# Patient Record
Sex: MALE | Race: WHITE | Employment: FULL TIME | URBAN - NONMETROPOLITAN AREA
[De-identification: names, ages, dates, MRNs, and addresses within clinical notes are randomized per-mention and may not be internally consistent; named-entity substitution may affect disease eponyms.]

---

## 2017-07-19 RX ORDER — ROSUVASTATIN CALCIUM 10 MG/1
TABLET, COATED ORAL
Qty: 90 TABLET | Refills: 3 | Status: SHIPPED | OUTPATIENT
Start: 2017-07-19 | End: 2018-02-01 | Stop reason: SDUPTHER

## 2017-07-31 RX ORDER — DIPHENOXYLATE HYDROCHLORIDE AND ATROPINE SULFATE 2.5; .025 MG/1; MG/1
1 TABLET ORAL 4 TIMES DAILY PRN
Qty: 180 TABLET | Refills: 1 | Status: SHIPPED | OUTPATIENT
Start: 2017-07-31 | End: 2017-08-28 | Stop reason: SDUPTHER

## 2017-08-28 RX ORDER — DIPHENOXYLATE HYDROCHLORIDE AND ATROPINE SULFATE 2.5; .025 MG/1; MG/1
1 TABLET ORAL 4 TIMES DAILY PRN
Qty: 56 TABLET | Refills: 0 | Status: SHIPPED | OUTPATIENT
Start: 2017-08-28 | End: 2017-10-03 | Stop reason: SDUPTHER

## 2017-10-03 ENCOUNTER — OFFICE VISIT (OUTPATIENT)
Dept: FAMILY MEDICINE CLINIC | Age: 62
End: 2017-10-03
Payer: COMMERCIAL

## 2017-10-03 VITALS
BODY MASS INDEX: 25.77 KG/M2 | SYSTOLIC BLOOD PRESSURE: 128 MMHG | HEART RATE: 55 BPM | WEIGHT: 180 LBS | OXYGEN SATURATION: 98 % | HEIGHT: 70 IN | RESPIRATION RATE: 18 BRPM | DIASTOLIC BLOOD PRESSURE: 80 MMHG

## 2017-10-03 DIAGNOSIS — K58.9 IRRITABLE BOWEL SYNDROME, UNSPECIFIED TYPE: ICD-10-CM

## 2017-10-03 DIAGNOSIS — K21.9 GERD WITHOUT ESOPHAGITIS: ICD-10-CM

## 2017-10-03 DIAGNOSIS — I10 ESSENTIAL (PRIMARY) HYPERTENSION: ICD-10-CM

## 2017-10-03 DIAGNOSIS — E78.01 FAMILIAL HYPERCHOLESTEROLEMIA: Primary | ICD-10-CM

## 2017-10-03 PROCEDURE — 99214 OFFICE O/P EST MOD 30 MIN: CPT | Performed by: FAMILY MEDICINE

## 2017-10-03 RX ORDER — DIPHENOXYLATE HYDROCHLORIDE AND ATROPINE SULFATE 2.5; .025 MG/1; MG/1
1 TABLET ORAL 2 TIMES DAILY
Qty: 180 TABLET | Refills: 0 | Status: SHIPPED | OUTPATIENT
Start: 2017-10-03 | End: 2017-12-05 | Stop reason: SDUPTHER

## 2017-10-03 RX ORDER — PHENOBARBITAL, HYOSCYAMINE SULFATE, ATROPINE SULFATE AND SCOPOLAMINE HYDROBROMIDE .0194; .1037; 16.2; .0065 MG/1; MG/1; MG/1; MG/1
1 TABLET ORAL DAILY
Qty: 90 TABLET | Refills: 1 | Status: SHIPPED | OUTPATIENT
Start: 2017-10-03 | End: 2018-02-01 | Stop reason: SDUPTHER

## 2017-10-03 RX ORDER — METOPROLOL SUCCINATE 100 MG/1
TABLET, EXTENDED RELEASE ORAL
COMMUNITY
Start: 2017-08-08 | End: 2018-02-01 | Stop reason: SDUPTHER

## 2017-10-03 RX ORDER — PHENOBARBITAL, HYOSCYAMINE SULFATE, ATROPINE SULFATE AND SCOPOLAMINE HYDROBROMIDE .0194; .1037; 16.2; .0065 MG/1; MG/1; MG/1; MG/1
1 TABLET ORAL DAILY
COMMUNITY
End: 2017-10-03 | Stop reason: SDUPTHER

## 2017-10-03 NOTE — MR AVS SNAPSHOT
After Visit Summary             Hyacinth Cheema   10/3/2017 3:30 PM   Office Visit    Description:  Male : 1955   Provider:  Charli Ellis MD   Department:  [address]              Your Follow-Up and Future Appointments         Below is a list of your follow-up and future appointments. This may not be a complete list as you may have made appointments directly with providers that we are not aware of or your providers may have made some for you. Please call your providers to confirm appointments. It is important to keep your appointments. Please bring your current insurance card, photo ID, co-pay, and all medication bottles to your appointment. If self-pay, payment is expected at the time of service. Your To-Do List     Future Appointments Provider Department Dept Phone    2018 2:45 PM MD [address] [phone]    If this is a sports or school physical please bring the physical form with you. Future Orders Complete By Expires    Comprehensive Metabolic Panel [QKY67 Custom]  2017    Lipid Panel [LAB18 Custom]  2017    Follow-Up    Return in about 3 months (around 1/3/2018) for Physical.         Information from Your Visit        Department     Name Address Phone Fax    [address] [address] [address]  [phone]      You Were Seen for:         Comments    Familial hypercholesterolemia   [577366]         Vital Signs     Blood Pressure Pulse Respirations Height Weight Oxygen Saturation    128/80 55 18 5' 10\" (1.778 m) 180 lb (81.6 kg) 98%    Body Mass Index Smoking Status                25.83 kg/m2 Light Tobacco Smoker          Additional Information about your Body Mass Index (BMI)           Your BMI as listed above is considered overweight (25.0-29.9). BMI is an estimate of body fat, calculated from your height and weight.   The higher aspirin 81 MG EC tablet Take 81 mg by mouth daily. propranolol (INDERAL LA) 60 MG CR capsule Take 60 mg by mouth daily. phenobarbital-belladonna alkaloids 16 MG/5ML elixir Take 2 mLs by mouth every 6 hours. Allergies              Dexamethasone     Severe hiccups placed in hospital for 3 days    Celebrex [Celecoxib] Rash         Additional Information        Basic Information     Date Of Birth Sex Race Ethnicity Preferred Language Preferred Written Language    1955 Male White Non-/Non  English English      Problem List as of 10/3/2017  Date Reviewed: 11/3/2011                Familial hypercholesterolemia    S/P Exc Inclusion Cyst Rt Buttock, Perianal Fistulotomy, Ext Hemorrhoids      Preventive Care        Date Due    Hepatitis C screening is recommended for all adults regardless of risk factors born between [birth year] and 1965 at least once (lifetime) who have never been tested. 1955    HIV screening is recommended for all people regardless of risk factors  aged 15-65 years at least once (lifetime) who have never been HIV tested. 1/31/1971    Tetanus Combination Vaccine (1 - Tdap) 1/31/1975    Pneumococcal Vaccine - Pneumovax for adults aged 19-64 years with: chronic heart disease, chronic lung disease, diabetes mellitus, alcoholism, chronic liver disease, or cigarette smoking. (1 of 1 - PPSV23) 1/31/1975    Cholesterol Screening 1/31/1996    Diabetes Screening 1/31/1996    Colonoscopy 1/31/2006    Zoster Vaccine 1/31/2016    Yearly Flu Vaccine (1) 9/1/2017            MyChart Signup           MyChart allows you to send messages to your doctor, view your test results, renew your prescriptions, schedule appointments, view visit notes, and more. How Do I Sign Up? 1. In your Internet browser, go to [email or web address]  2. Click on the Sign Up Now link in the Sign In box. You will see the New Member Sign Up page. 3. Enter your Taggled Access Code exactly as it appears below. You will not need to use this code after youve completed the sign-up process. If you do not sign up before the expiration date, you must request a new code. Taggled Access Code: [email or web address]  Expires: 12/2/2017  4:40 PM    4. Enter your Social Security Number ([id number]) and Date of Birth (mm/dd/yyyy) as indicated and click Submit. You will be taken to the next sign-up page. 5. Create a Taggled ID. This will be your Taggled login ID and cannot be changed, so think of one that is secure and easy to remember. 6. Create a Taggled password. You can change your password at any time. 7. Enter your Password Reset Question and Answer. This can be used at a later time if you forget your password. 8. Enter your e-mail address. You will receive e-mail notification when new information is available in [address]. 9. Click Sign Up. You can now view your medical record. Additional Information  If you have questions, please contact the physician practice where you receive care. Remember, Taggled is NOT to be used for urgent needs. For medical emergencies, dial 911. For questions regarding your Taggled account call [phone]. If you have a clinical question, please call your doctor's office.

## 2017-10-22 PROBLEM — M51.36 DISC DEGENERATION, LUMBAR: Status: ACTIVE | Noted: 2017-10-22

## 2017-10-22 PROBLEM — M51.369 DISC DEGENERATION, LUMBAR: Status: ACTIVE | Noted: 2017-10-22

## 2017-10-22 PROBLEM — I10 ESSENTIAL (PRIMARY) HYPERTENSION: Status: ACTIVE | Noted: 2017-10-22

## 2017-10-22 PROBLEM — K58.9 IRRITABLE BOWEL SYNDROME: Status: ACTIVE | Noted: 2017-10-22

## 2017-10-22 PROBLEM — K21.9 GERD WITHOUT ESOPHAGITIS: Status: ACTIVE | Noted: 2017-10-22

## 2017-10-22 PROBLEM — J84.10 LUNG GRANULOMA (HCC): Status: ACTIVE | Noted: 2017-10-22

## 2017-10-22 PROBLEM — K22.2 ESOPHAGEAL STRICTURE: Status: ACTIVE | Noted: 2017-10-22

## 2017-10-22 PROBLEM — K76.89 HEPATIC CYST: Status: ACTIVE | Noted: 2017-10-22

## 2017-10-22 PROBLEM — J44.9 CHRONIC OBSTRUCTIVE PULMONARY DISEASE (HCC): Status: ACTIVE | Noted: 2017-10-22

## 2017-10-22 PROBLEM — K27.9 PEPTIC ULCER: Status: ACTIVE | Noted: 2017-10-22

## 2017-11-02 DIAGNOSIS — E78.01 FAMILIAL HYPERCHOLESTEROLEMIA: ICD-10-CM

## 2017-11-02 LAB
ALBUMIN SERPL-MCNC: 3.8 G/DL (ref 3.5–5.2)
ALP BLD-CCNC: 70 U/L (ref 40–130)
ALT SERPL-CCNC: 18 U/L (ref 5–41)
ANION GAP SERPL CALCULATED.3IONS-SCNC: 12 MMOL/L (ref 7–19)
AST SERPL-CCNC: 18 U/L (ref 5–40)
BILIRUB SERPL-MCNC: 0.3 MG/DL (ref 0.2–1.2)
BUN BLDV-MCNC: 10 MG/DL (ref 8–23)
CALCIUM SERPL-MCNC: 9 MG/DL (ref 8.8–10.2)
CHLORIDE BLD-SCNC: 101 MMOL/L (ref 98–111)
CHOLESTEROL, TOTAL: 133 MG/DL (ref 160–199)
CO2: 30 MMOL/L (ref 22–29)
CREAT SERPL-MCNC: 0.9 MG/DL (ref 0.5–1.2)
GFR NON-AFRICAN AMERICAN: >60
GLUCOSE BLD-MCNC: 92 MG/DL (ref 74–109)
HDLC SERPL-MCNC: 42 MG/DL (ref 55–121)
LDL CHOLESTEROL CALCULATED: 62 MG/DL
POTASSIUM SERPL-SCNC: 3.4 MMOL/L (ref 3.5–5)
SODIUM BLD-SCNC: 143 MMOL/L (ref 136–145)
TOTAL PROTEIN: 6.5 G/DL (ref 6.6–8.7)
TRIGL SERPL-MCNC: 147 MG/DL (ref 0–149)

## 2017-12-05 RX ORDER — DIPHENOXYLATE HYDROCHLORIDE AND ATROPINE SULFATE 2.5; .025 MG/1; MG/1
TABLET ORAL
Qty: 180 TABLET | Refills: 0 | Status: SHIPPED | OUTPATIENT
Start: 2017-12-05 | End: 2018-02-21 | Stop reason: SDUPTHER

## 2017-12-05 RX ORDER — DIPHENOXYLATE HYDROCHLORIDE AND ATROPINE SULFATE 2.5; .025 MG/1; MG/1
TABLET ORAL
Qty: 180 TABLET | Refills: 0 | Status: SHIPPED | OUTPATIENT
Start: 2017-12-05 | End: 2018-03-05 | Stop reason: SDUPTHER

## 2018-02-01 RX ORDER — PHENOBARBITAL, HYOSCYAMINE SULFATE, ATROPINE SULFATE AND SCOPOLAMINE HYDROBROMIDE .0194; .1037; 16.2; .0065 MG/1; MG/1; MG/1; MG/1
1 TABLET ORAL DAILY
Qty: 90 TABLET | Refills: 1 | Status: SHIPPED | OUTPATIENT
Start: 2018-02-01 | End: 2018-02-21 | Stop reason: SDUPTHER

## 2018-02-01 RX ORDER — PROPRANOLOL HCL 60 MG
60 CAPSULE, EXTENDED RELEASE 24HR ORAL DAILY
Qty: 90 CAPSULE | Refills: 2 | Status: SHIPPED | OUTPATIENT
Start: 2018-02-01 | End: 2018-03-12 | Stop reason: CLARIF

## 2018-02-01 RX ORDER — LOSARTAN POTASSIUM AND HYDROCHLOROTHIAZIDE 12.5; 1 MG/1; MG/1
TABLET ORAL
Qty: 90 TABLET | Refills: 3 | Status: SHIPPED | OUTPATIENT
Start: 2018-02-01 | End: 2019-01-16 | Stop reason: SDUPTHER

## 2018-02-01 RX ORDER — IRBESARTAN AND HYDROCHLOROTHIAZIDE 150; 12.5 MG/1; MG/1
1 TABLET, FILM COATED ORAL DAILY
Qty: 90 TABLET | Refills: 2 | Status: SHIPPED | OUTPATIENT
Start: 2018-02-01 | End: 2018-03-12 | Stop reason: CLARIF

## 2018-02-01 RX ORDER — METOPROLOL SUCCINATE 100 MG/1
100 TABLET, EXTENDED RELEASE ORAL DAILY
Qty: 90 TABLET | Refills: 2 | Status: SHIPPED | OUTPATIENT
Start: 2018-02-01 | End: 2018-10-16 | Stop reason: SDUPTHER

## 2018-02-01 RX ORDER — ROSUVASTATIN CALCIUM 10 MG/1
TABLET, COATED ORAL
Qty: 90 TABLET | Refills: 3 | Status: SHIPPED | OUTPATIENT
Start: 2018-02-01 | End: 2018-12-31 | Stop reason: SDUPTHER

## 2018-02-21 RX ORDER — BUPROPION HYDROCHLORIDE 150 MG/1
150 TABLET, EXTENDED RELEASE ORAL DAILY
COMMUNITY
End: 2018-02-21 | Stop reason: SDUPTHER

## 2018-02-22 RX ORDER — BUPROPION HYDROCHLORIDE 150 MG/1
150 TABLET, EXTENDED RELEASE ORAL DAILY
Qty: 180 TABLET | Refills: 2 | Status: SHIPPED | OUTPATIENT
Start: 2018-02-22 | End: 2018-03-05 | Stop reason: SDUPTHER

## 2018-02-22 RX ORDER — DIPHENOXYLATE HYDROCHLORIDE AND ATROPINE SULFATE 2.5; .025 MG/1; MG/1
TABLET ORAL
Qty: 180 TABLET | Refills: 0 | Status: SHIPPED | OUTPATIENT
Start: 2018-02-22 | End: 2018-06-04 | Stop reason: SDUPTHER

## 2018-02-22 RX ORDER — PHENOBARBITAL, HYOSCYAMINE SULFATE, ATROPINE SULFATE AND SCOPOLAMINE HYDROBROMIDE .0194; .1037; 16.2; .0065 MG/1; MG/1; MG/1; MG/1
1 TABLET ORAL DAILY
Qty: 90 TABLET | Refills: 1 | Status: SHIPPED | OUTPATIENT
Start: 2018-02-22 | End: 2018-03-05 | Stop reason: SDUPTHER

## 2018-03-05 RX ORDER — BUPROPION HYDROCHLORIDE 150 MG/1
150 TABLET, EXTENDED RELEASE ORAL DAILY
Qty: 180 TABLET | Refills: 2 | Status: SHIPPED | OUTPATIENT
Start: 2018-03-05 | End: 2019-02-11 | Stop reason: SDUPTHER

## 2018-03-05 RX ORDER — PHENOBARBITAL, HYOSCYAMINE SULFATE, ATROPINE SULFATE AND SCOPOLAMINE HYDROBROMIDE .0194; .1037; 16.2; .0065 MG/1; MG/1; MG/1; MG/1
1 TABLET ORAL DAILY
Qty: 90 TABLET | Refills: 1 | Status: SHIPPED | OUTPATIENT
Start: 2018-03-05 | End: 2018-03-06 | Stop reason: SDUPTHER

## 2018-03-05 RX ORDER — DIPHENOXYLATE HYDROCHLORIDE AND ATROPINE SULFATE 2.5; .025 MG/1; MG/1
TABLET ORAL
Qty: 180 TABLET | Refills: 0 | Status: SHIPPED | OUTPATIENT
Start: 2018-03-05 | End: 2018-03-12 | Stop reason: CLARIF

## 2018-03-06 RX ORDER — PHENOBARBITAL, HYOSCYAMINE SULFATE, ATROPINE SULFATE AND SCOPOLAMINE HYDROBROMIDE .0194; .1037; 16.2; .0065 MG/1; MG/1; MG/1; MG/1
1 TABLET ORAL DAILY
Qty: 90 TABLET | Refills: 1 | Status: SHIPPED | OUTPATIENT
Start: 2018-03-06 | End: 2018-03-12 | Stop reason: CLARIF

## 2018-03-07 ENCOUNTER — TELEPHONE (OUTPATIENT)
Dept: FAMILY MEDICINE CLINIC | Age: 63
End: 2018-03-07

## 2018-03-08 RX ORDER — CHLORDIAZEPOXIDE HYDROCHLORIDE AND CLIDINIUM BROMIDE 5; 2.5 MG/1; MG/1
1 CAPSULE ORAL
Qty: 90 CAPSULE | Refills: 3 | Status: SHIPPED | OUTPATIENT
Start: 2018-03-08 | End: 2018-03-08 | Stop reason: CLARIF

## 2018-03-08 RX ORDER — CHLORDIAZEPOXIDE HYDROCHLORIDE AND CLIDINIUM BROMIDE 5; 2.5 MG/1; MG/1
1 CAPSULE ORAL DAILY
Qty: 90 CAPSULE | Refills: 0 | Status: SHIPPED | OUTPATIENT
Start: 2018-03-08 | End: 2018-10-16 | Stop reason: CLARIF

## 2018-03-08 RX ORDER — CHLORDIAZEPOXIDE HYDROCHLORIDE AND CLIDINIUM BROMIDE 5; 2.5 MG/1; MG/1
1 CAPSULE ORAL
Qty: 270 CAPSULE | Refills: 0 | Status: SHIPPED | OUTPATIENT
Start: 2018-03-08 | End: 2018-03-08 | Stop reason: CLARIF

## 2018-03-08 NOTE — TELEPHONE ENCOUNTER
Per Patient librax will have to replace Daltonheavenal for  His insurance to pay. Is this ok to send in? He is requesting a 90 day supply. I did tell him that you may not be able to do that because it is controlled. I pended an order for  You to sign.  Not sure if it would be correct dose

## 2018-03-12 ENCOUNTER — OFFICE VISIT (OUTPATIENT)
Dept: FAMILY MEDICINE CLINIC | Age: 63
End: 2018-03-12
Payer: COMMERCIAL

## 2018-03-12 VITALS
HEART RATE: 61 BPM | TEMPERATURE: 98.3 F | HEIGHT: 70 IN | WEIGHT: 184 LBS | BODY MASS INDEX: 26.34 KG/M2 | SYSTOLIC BLOOD PRESSURE: 122 MMHG | OXYGEN SATURATION: 99 % | DIASTOLIC BLOOD PRESSURE: 70 MMHG

## 2018-03-12 DIAGNOSIS — Z00.00 ANNUAL PHYSICAL EXAM: ICD-10-CM

## 2018-03-12 DIAGNOSIS — K58.9 IRRITABLE BOWEL SYNDROME, UNSPECIFIED TYPE: ICD-10-CM

## 2018-03-12 DIAGNOSIS — I10 ESSENTIAL (PRIMARY) HYPERTENSION: ICD-10-CM

## 2018-03-12 DIAGNOSIS — E78.01 FAMILIAL HYPERCHOLESTEROLEMIA: Primary | ICD-10-CM

## 2018-03-12 DIAGNOSIS — Z12.5 SCREENING FOR PROSTATE CANCER: ICD-10-CM

## 2018-03-12 PROCEDURE — 99396 PREV VISIT EST AGE 40-64: CPT | Performed by: FAMILY MEDICINE

## 2018-03-12 RX ORDER — HYOSCYAMINE SULFATE 0.125 MG
125 TABLET ORAL DAILY
Qty: 90 TABLET | Refills: 3 | Status: SHIPPED | OUTPATIENT
Start: 2018-03-12 | End: 2018-10-16 | Stop reason: SDUPTHER

## 2018-03-12 RX ORDER — PHENOBARBITAL 16.2 MG/1
16.2 TABLET ORAL DAILY
Qty: 90 TABLET | Refills: 3 | Status: SHIPPED | OUTPATIENT
Start: 2018-03-12 | End: 2018-11-07 | Stop reason: SDUPTHER

## 2018-03-12 ASSESSMENT — PATIENT HEALTH QUESTIONNAIRE - PHQ9
SUM OF ALL RESPONSES TO PHQ QUESTIONS 1-9: 0
2. FEELING DOWN, DEPRESSED OR HOPELESS: 0
1. LITTLE INTEREST OR PLEASURE IN DOING THINGS: 0
SUM OF ALL RESPONSES TO PHQ9 QUESTIONS 1 & 2: 0

## 2018-03-16 ASSESSMENT — ENCOUNTER SYMPTOMS
ANAL BLEEDING: 0
SHORTNESS OF BREATH: 0
CHEST TIGHTNESS: 0
ABDOMINAL PAIN: 0
COUGH: 0
CONSTIPATION: 0
DIARRHEA: 0
NAUSEA: 0

## 2018-06-04 DIAGNOSIS — K58.0 IRRITABLE BOWEL SYNDROME WITH DIARRHEA: Primary | ICD-10-CM

## 2018-06-04 RX ORDER — DIPHENOXYLATE HYDROCHLORIDE AND ATROPINE SULFATE 2.5; .025 MG/1; MG/1
TABLET ORAL
Qty: 180 TABLET | Refills: 3 | Status: SHIPPED | OUTPATIENT
Start: 2018-06-04 | End: 2018-10-16 | Stop reason: SDUPTHER

## 2018-10-16 ENCOUNTER — OFFICE VISIT (OUTPATIENT)
Dept: FAMILY MEDICINE CLINIC | Age: 63
End: 2018-10-16
Payer: COMMERCIAL

## 2018-10-16 VITALS
HEART RATE: 47 BPM | WEIGHT: 180 LBS | DIASTOLIC BLOOD PRESSURE: 72 MMHG | OXYGEN SATURATION: 99 % | BODY MASS INDEX: 25.83 KG/M2 | SYSTOLIC BLOOD PRESSURE: 124 MMHG | TEMPERATURE: 97.5 F

## 2018-10-16 DIAGNOSIS — R07.89 CHEST TIGHTNESS OR PRESSURE: ICD-10-CM

## 2018-10-16 DIAGNOSIS — K58.0 IRRITABLE BOWEL SYNDROME WITH DIARRHEA: ICD-10-CM

## 2018-10-16 DIAGNOSIS — J44.9 CHRONIC OBSTRUCTIVE PULMONARY DISEASE, UNSPECIFIED COPD TYPE (HCC): ICD-10-CM

## 2018-10-16 DIAGNOSIS — R00.1 BRADYCARDIA: ICD-10-CM

## 2018-10-16 DIAGNOSIS — R53.83 FATIGUE, UNSPECIFIED TYPE: ICD-10-CM

## 2018-10-16 DIAGNOSIS — G56.03 BILATERAL CARPAL TUNNEL SYNDROME: ICD-10-CM

## 2018-10-16 DIAGNOSIS — I10 ESSENTIAL (PRIMARY) HYPERTENSION: Primary | ICD-10-CM

## 2018-10-16 LAB

## 2018-10-16 PROCEDURE — 93000 ELECTROCARDIOGRAM COMPLETE: CPT | Performed by: FAMILY MEDICINE

## 2018-10-16 PROCEDURE — 99214 OFFICE O/P EST MOD 30 MIN: CPT | Performed by: FAMILY MEDICINE

## 2018-10-16 PROCEDURE — 80305 DRUG TEST PRSMV DIR OPT OBS: CPT | Performed by: FAMILY MEDICINE

## 2018-10-16 RX ORDER — DIPHENOXYLATE HYDROCHLORIDE AND ATROPINE SULFATE 2.5; .025 MG/1; MG/1
TABLET ORAL
Qty: 180 TABLET | Refills: 3 | Status: SHIPPED | OUTPATIENT
Start: 2018-10-16 | End: 2019-03-20 | Stop reason: SDUPTHER

## 2018-10-16 RX ORDER — HYOSCYAMINE SULFATE 0.125 MG
125 TABLET ORAL DAILY
Qty: 90 TABLET | Refills: 3 | Status: SHIPPED | OUTPATIENT
Start: 2018-10-16 | End: 2019-10-14 | Stop reason: SDUPTHER

## 2018-10-16 RX ORDER — METOPROLOL SUCCINATE 50 MG/1
50 TABLET, EXTENDED RELEASE ORAL DAILY
Qty: 90 TABLET | Refills: 0
Start: 2018-10-16 | End: 2019-02-11 | Stop reason: SDUPTHER

## 2018-10-16 NOTE — PROGRESS NOTES
[facility]  MEDICINE  [facility] [facility]  [address] [facility] [address]  Dept: [phone]  Dept Fax: [phone]  Loc: [phone]    Nadine Neal is a 59 y.o. male who presents today for his medical conditions/complaintsas noted below. Nadine Neal is c/o of 3 Month Follow-Up        HPI:   Patient is here for follow up. He states he is doing well overall. He had CTS repair on right 10 years ago. He now has symptoms x 1 year of numbness in hands, pain that extends to mid arm. NO neck or shoulder pain. He has weakness in  at times. He rides a bike. He reports fatigue daily. HR 47. He denies chest pain, palpitations, sob. He reports chest \"tightness\" at time, denies pain, radiation, nausea. No palpitations. He states IBS controlled on current meds. No daily abdominal pain or diarrhea. Hypertension  Compliant with medications. No adverse effects from medication. No lightheadedness, palpitations, or chest pain. Labs due. HPI    Past Medical History:   Diagnosis Date    Hyperlipidemia     Hypertension     IBS (irritable bowel syndrome)      Past Surgical History:   Procedure Laterality Date    APPENDECTOMY  1974    CYST REMOVAL  1990    neck    RECTAL SURGERY  10/2011    Exc Inclusion Cyst Rt Buttock, Perianal Area, Fistulotomy, Ext Hemorrhoids    SKIN CANCER EXCISION  8/2011    back of neck - precancerous       Family History   Problem Relation Age of Onset    High Blood Pressure Mother     Alzheimer's Disease Mother     High Blood Pressure Father     Cancer Father         lung       Social History   Substance Use Topics    Smoking status: Light Tobacco Smoker     Years: 30.00    Smokeless tobacco: Never Used    Alcohol use Yes      Comment: rarely      Current Outpatient Prescriptions   Medication Sig Dispense Refill    diphenoxylate-atropine (LOMOTIL) 2.5-0.025 MG per tablet TAKE 1 TABLET TWICE A DAY.  180 tablet 3    stress echo. If pain recurs prior to test, go to ED. He declines stress test this week due to work schedule, advised to obtain as soon as possible. Bilateral carpal tunnel syndrome  Wear wrist braces for support, consider referral to surgery if no improvement. Other orders  -     metoprolol succinate (TOPROL XL) 50 MG extended release tablet; Take 1 tablet by mouth daily  -     hyoscyamine (LEVSIN) 125 MCG tablet; Take 1 tablet by mouth daily            Return in about 3 months (around 1/16/2019). Discussed use, benefit, and side effects of prescribed medications. All patient questions answered. Pt voiced understanding. Reviewed health maintenance. Instructed to continue current medications, diet and exercise. Patient agreedwith treatment plan. Follow up as directed.

## 2018-10-23 PROBLEM — R07.89 CHEST TIGHTNESS OR PRESSURE: Status: ACTIVE | Noted: 2018-10-23

## 2018-10-23 PROBLEM — G56.03 BILATERAL CARPAL TUNNEL SYNDROME: Status: ACTIVE | Noted: 2018-10-23

## 2018-10-23 ASSESSMENT — ENCOUNTER SYMPTOMS
NAUSEA: 0
ANAL BLEEDING: 0
DIARRHEA: 0
COUGH: 0
CONSTIPATION: 0
SHORTNESS OF BREATH: 0
CHEST TIGHTNESS: 1
ABDOMINAL PAIN: 0

## 2018-10-25 ENCOUNTER — TELEPHONE (OUTPATIENT)
Dept: FAMILY MEDICINE CLINIC | Age: 63
End: 2018-10-25

## 2018-10-31 ENCOUNTER — TELEPHONE (OUTPATIENT)
Dept: FAMILY MEDICINE CLINIC | Age: 63
End: 2018-10-31

## 2018-11-02 NOTE — TELEPHONE ENCOUNTER
I got in touch with Liset the patients wife and she stated that he absolutely refuses to do a stress test. She stated that he is scared of this and he will not get it done anywhere or anytime no matter how hard she trys to tell him that he needs this done.

## 2018-11-07 DIAGNOSIS — K58.9 IRRITABLE BOWEL SYNDROME, UNSPECIFIED TYPE: Primary | ICD-10-CM

## 2018-11-08 RX ORDER — PHENOBARBITAL 16.2 MG/1
16.2 TABLET ORAL DAILY
Qty: 90 TABLET | Refills: 0 | Status: SHIPPED | OUTPATIENT
Start: 2018-11-08 | End: 2018-12-06 | Stop reason: SDUPTHER

## 2018-12-06 DIAGNOSIS — K58.9 IRRITABLE BOWEL SYNDROME, UNSPECIFIED TYPE: ICD-10-CM

## 2018-12-06 RX ORDER — PHENOBARBITAL 16.2 MG/1
16.2 TABLET ORAL DAILY
Qty: 90 TABLET | Refills: 0 | Status: SHIPPED | OUTPATIENT
Start: 2018-12-06 | End: 2019-03-04 | Stop reason: SDUPTHER

## 2018-12-31 RX ORDER — ROSUVASTATIN CALCIUM 10 MG/1
TABLET, COATED ORAL
Qty: 90 TABLET | Refills: 3 | Status: SHIPPED | OUTPATIENT
Start: 2018-12-31 | End: 2019-12-16

## 2019-01-16 RX ORDER — LOSARTAN POTASSIUM AND HYDROCHLOROTHIAZIDE 12.5; 1 MG/1; MG/1
TABLET ORAL
Qty: 90 TABLET | Refills: 1 | Status: SHIPPED | OUTPATIENT
Start: 2019-01-16 | End: 2019-07-15 | Stop reason: SDUPTHER

## 2019-02-11 RX ORDER — BUPROPION HYDROCHLORIDE 150 MG/1
150 TABLET, EXTENDED RELEASE ORAL DAILY
Qty: 180 TABLET | Refills: 3 | Status: SHIPPED | OUTPATIENT
Start: 2019-02-11 | End: 2020-02-03

## 2019-02-11 RX ORDER — METOPROLOL SUCCINATE 50 MG/1
50 TABLET, EXTENDED RELEASE ORAL DAILY
Qty: 90 TABLET | Refills: 3 | Status: SHIPPED | OUTPATIENT
Start: 2019-02-11 | End: 2020-01-20

## 2019-02-25 DIAGNOSIS — Z12.5 SCREENING FOR PROSTATE CANCER: ICD-10-CM

## 2019-02-25 DIAGNOSIS — E78.01 FAMILIAL HYPERCHOLESTEROLEMIA: ICD-10-CM

## 2019-02-25 LAB
ALBUMIN SERPL-MCNC: 4.1 G/DL (ref 3.5–5.2)
ALP BLD-CCNC: 62 U/L (ref 40–130)
ALT SERPL-CCNC: 23 U/L (ref 5–41)
ANION GAP SERPL CALCULATED.3IONS-SCNC: 15 MMOL/L (ref 7–19)
AST SERPL-CCNC: 16 U/L (ref 5–40)
BILIRUB SERPL-MCNC: 0.4 MG/DL (ref 0.2–1.2)
BUN BLDV-MCNC: 13 MG/DL (ref 8–23)
CALCIUM SERPL-MCNC: 9.3 MG/DL (ref 8.8–10.2)
CHLORIDE BLD-SCNC: 103 MMOL/L (ref 98–111)
CHOLESTEROL, TOTAL: 144 MG/DL (ref 160–199)
CO2: 27 MMOL/L (ref 22–29)
CREAT SERPL-MCNC: 0.9 MG/DL (ref 0.5–1.2)
GFR NON-AFRICAN AMERICAN: >60
GLUCOSE BLD-MCNC: 92 MG/DL (ref 74–109)
HDLC SERPL-MCNC: 43 MG/DL (ref 55–121)
LDL CHOLESTEROL CALCULATED: 68 MG/DL
POTASSIUM SERPL-SCNC: 3.5 MMOL/L (ref 3.5–5)
PROSTATE SPECIFIC ANTIGEN: 0.56 NG/ML (ref 0–4)
SODIUM BLD-SCNC: 145 MMOL/L (ref 136–145)
TOTAL PROTEIN: 6.7 G/DL (ref 6.6–8.7)
TRIGL SERPL-MCNC: 163 MG/DL (ref 0–149)

## 2019-03-04 ENCOUNTER — OFFICE VISIT (OUTPATIENT)
Dept: FAMILY MEDICINE CLINIC | Age: 64
End: 2019-03-04
Payer: COMMERCIAL

## 2019-03-04 VITALS
TEMPERATURE: 97.8 F | OXYGEN SATURATION: 99 % | WEIGHT: 189 LBS | DIASTOLIC BLOOD PRESSURE: 86 MMHG | HEART RATE: 53 BPM | BODY MASS INDEX: 27.12 KG/M2 | SYSTOLIC BLOOD PRESSURE: 130 MMHG

## 2019-03-04 DIAGNOSIS — I10 ESSENTIAL (PRIMARY) HYPERTENSION: Primary | ICD-10-CM

## 2019-03-04 DIAGNOSIS — F17.210 CIGARETTE NICOTINE DEPENDENCE WITHOUT COMPLICATION: ICD-10-CM

## 2019-03-04 DIAGNOSIS — J44.9 CHRONIC OBSTRUCTIVE PULMONARY DISEASE, UNSPECIFIED COPD TYPE (HCC): ICD-10-CM

## 2019-03-04 DIAGNOSIS — Z87.891 PERSONAL HISTORY OF TOBACCO USE: ICD-10-CM

## 2019-03-04 DIAGNOSIS — K58.9 IRRITABLE BOWEL SYNDROME, UNSPECIFIED TYPE: ICD-10-CM

## 2019-03-04 DIAGNOSIS — K21.9 GERD WITHOUT ESOPHAGITIS: ICD-10-CM

## 2019-03-04 PROCEDURE — 99214 OFFICE O/P EST MOD 30 MIN: CPT | Performed by: FAMILY MEDICINE

## 2019-03-04 RX ORDER — PHENOBARBITAL 16.2 MG/1
16.2 TABLET ORAL DAILY
Qty: 90 TABLET | Refills: 0 | Status: SHIPPED | OUTPATIENT
Start: 2019-03-04 | End: 2019-06-04 | Stop reason: SDUPTHER

## 2019-03-04 ASSESSMENT — PATIENT HEALTH QUESTIONNAIRE - PHQ9
1. LITTLE INTEREST OR PLEASURE IN DOING THINGS: 0
SUM OF ALL RESPONSES TO PHQ QUESTIONS 1-9: 0
SUM OF ALL RESPONSES TO PHQ QUESTIONS 1-9: 0
SUM OF ALL RESPONSES TO PHQ9 QUESTIONS 1 & 2: 0
2. FEELING DOWN, DEPRESSED OR HOPELESS: 0

## 2019-03-06 PROBLEM — F17.210 CIGARETTE NICOTINE DEPENDENCE WITHOUT COMPLICATION: Status: ACTIVE | Noted: 2019-03-06

## 2019-03-06 PROBLEM — Z87.891 PERSONAL HISTORY OF TOBACCO USE: Status: ACTIVE | Noted: 2019-03-06

## 2019-03-06 ASSESSMENT — ENCOUNTER SYMPTOMS
CHEST TIGHTNESS: 0
SHORTNESS OF BREATH: 0
DIARRHEA: 0
ABDOMINAL PAIN: 0
NAUSEA: 0
COUGH: 0
CONSTIPATION: 0
ANAL BLEEDING: 0

## 2019-03-20 ENCOUNTER — PATIENT MESSAGE (OUTPATIENT)
Dept: FAMILY MEDICINE CLINIC | Age: 64
End: 2019-03-20

## 2019-03-20 DIAGNOSIS — K58.0 IRRITABLE BOWEL SYNDROME WITH DIARRHEA: ICD-10-CM

## 2019-03-20 RX ORDER — DIPHENOXYLATE HYDROCHLORIDE AND ATROPINE SULFATE 2.5; .025 MG/1; MG/1
TABLET ORAL
Qty: 14 TABLET | Refills: 0 | Status: SHIPPED | OUTPATIENT
Start: 2019-03-20 | End: 2019-09-11 | Stop reason: SDUPTHER

## 2019-03-21 RX ORDER — DIPHENOXYLATE HYDROCHLORIDE AND ATROPINE SULFATE 2.5; .025 MG/1; MG/1
TABLET ORAL
Qty: 180 TABLET | Refills: 3 | Status: SHIPPED | OUTPATIENT
Start: 2019-03-21 | End: 2019-10-24 | Stop reason: SDUPTHER

## 2019-03-26 ENCOUNTER — HOSPITAL ENCOUNTER (OUTPATIENT)
Dept: CT IMAGING | Age: 64
Discharge: HOME | End: 2019-03-26
Payer: COMMERCIAL

## 2019-03-26 DIAGNOSIS — F17.210 CIGARETTE NICOTINE DEPENDENCE WITHOUT COMPLICATION: ICD-10-CM

## 2019-03-26 DIAGNOSIS — Z87.891 PERSONAL HISTORY OF TOBACCO USE: ICD-10-CM

## 2019-03-26 PROCEDURE — G0297 LDCT FOR LUNG CA SCREEN: HCPCS

## 2019-04-03 RX ORDER — HYOSCYAMINE SULFATE 0.125 MG
125 TABLET ORAL DAILY
Qty: 90 TABLET | Refills: 3 | Status: SHIPPED | OUTPATIENT
Start: 2019-04-03 | End: 2020-11-23 | Stop reason: SDUPTHER

## 2019-06-04 ENCOUNTER — PATIENT MESSAGE (OUTPATIENT)
Dept: FAMILY MEDICINE CLINIC | Age: 64
End: 2019-06-04

## 2019-06-04 DIAGNOSIS — K58.9 IRRITABLE BOWEL SYNDROME, UNSPECIFIED TYPE: ICD-10-CM

## 2019-06-04 RX ORDER — PHENOBARBITAL 16.2 MG/1
16.2 TABLET ORAL DAILY
Qty: 90 TABLET | Refills: 0 | Status: SHIPPED | OUTPATIENT
Start: 2019-06-04 | End: 2019-09-12 | Stop reason: SDUPTHER

## 2019-06-04 NOTE — TELEPHONE ENCOUNTER
From: Migel Barraza  To: Leonard Gibson MD  Sent: 6/4/2019 8:56 AM CDT  Subject: Prescription Question    Dr Arash Young  I have 2 week of Phenobarbital left before I will be out and no refills.  Would you please send me a new prescription to [facility] pharmacy   [facility]  90 day supply    Thank you   Carmen Fernandes

## 2019-06-04 NOTE — TELEPHONE ENCOUNTER
Elsi Arciniega called to request a refill on his medication. Last office visit : 3/4/2019   Next office visit : 6/11/2019     Last UDS:   Amphetamine Screen, Urine   Date Value Ref Range Status   10/16/2018 neg  Final     Barbiturate Screen, Urine   Date Value Ref Range Status   10/16/2018 neg  Final     Benzodiazepine Screen, Urine   Date Value Ref Range Status   10/16/2018 neg  Final     Buprenorphine Urine   Date Value Ref Range Status   10/16/2018 neg  Final     Cocaine Metabolite Screen, Urine   Date Value Ref Range Status   10/16/2018 neg  Final     Gabapentin Screen, Urine   Date Value Ref Range Status   10/16/2018 neg  Final     Methamphetamine, Urine   Date Value Ref Range Status   10/16/2018 neg  Final     Opiate Scrn, Ur   Date Value Ref Range Status   10/16/2018 neg  Final     Oxycodone Screen, Ur   Date Value Ref Range Status   10/16/2018 neg  Final     PCP Screen, Urine   Date Value Ref Range Status   10/16/2018 neg  Final     Propoxyphene Screen, Urine   Date Value Ref Range Status   10/16/2018 neg  Final     THC Screen, Urine   Date Value Ref Range Status   10/16/2018 neg  Final     Tricyclic Antidepressants, Urine   Date Value Ref Range Status   10/16/2018 neg  Final       Last Char Jumper: 06/04/19  Medication Contract: not on file   Last Fill: 03/04/19    Requested Prescriptions     Pending Prescriptions Disp Refills    PHENobarbital (LUMINAL) 16.2 MG tablet 90 tablet 0     Sig: Take 1 tablet by mouth daily for 90 days. Please approve or refuse this medication.    Mohamud Jackson LPN

## 2019-07-15 RX ORDER — LOSARTAN POTASSIUM AND HYDROCHLOROTHIAZIDE 12.5; 1 MG/1; MG/1
TABLET ORAL
Qty: 90 TABLET | Refills: 1 | Status: SHIPPED | OUTPATIENT
Start: 2019-07-15 | End: 2020-01-06

## 2019-08-27 RX ORDER — FLUOCINONIDE 0.5 MG/G
OINTMENT TOPICAL
Qty: 1 TUBE | Refills: 3 | Status: SHIPPED | OUTPATIENT
Start: 2019-08-27 | End: 2019-09-03

## 2019-08-27 RX ORDER — CLOTRIMAZOLE AND BETAMETHASONE DIPROPIONATE 10; .64 MG/G; MG/G
CREAM TOPICAL 3 TIMES DAILY
Qty: 1 TUBE | Refills: 2 | Status: SHIPPED | OUTPATIENT
Start: 2019-08-27 | End: 2019-11-06 | Stop reason: SDUPTHER

## 2019-09-11 ENCOUNTER — PATIENT MESSAGE (OUTPATIENT)
Dept: FAMILY MEDICINE CLINIC | Age: 64
End: 2019-09-11

## 2019-09-11 DIAGNOSIS — K58.9 IRRITABLE BOWEL SYNDROME, UNSPECIFIED TYPE: ICD-10-CM

## 2019-09-11 DIAGNOSIS — K58.0 IRRITABLE BOWEL SYNDROME WITH DIARRHEA: ICD-10-CM

## 2019-09-12 RX ORDER — DIPHENOXYLATE HYDROCHLORIDE AND ATROPINE SULFATE 2.5; .025 MG/1; MG/1
TABLET ORAL
Qty: 180 TABLET | Refills: 0 | Status: SHIPPED | OUTPATIENT
Start: 2019-09-12 | End: 2019-10-24

## 2019-09-12 RX ORDER — PHENOBARBITAL 16.2 MG/1
16.2 TABLET ORAL DAILY
Qty: 90 TABLET | Refills: 0 | Status: SHIPPED | OUTPATIENT
Start: 2019-09-12 | End: 2019-12-09 | Stop reason: SDUPTHER

## 2019-09-16 ENCOUNTER — TELEPHONE (OUTPATIENT)
Dept: FAMILY MEDICINE CLINIC | Age: 64
End: 2019-09-16

## 2019-10-14 RX ORDER — HYOSCYAMINE SULFATE 0.125 MG
TABLET ORAL
Qty: 90 TABLET | Refills: 3 | Status: SHIPPED | OUTPATIENT
Start: 2019-10-14 | End: 2019-10-24

## 2019-10-24 ENCOUNTER — OFFICE VISIT (OUTPATIENT)
Dept: FAMILY MEDICINE CLINIC | Age: 64
End: 2019-10-24
Payer: COMMERCIAL

## 2019-10-24 VITALS
DIASTOLIC BLOOD PRESSURE: 76 MMHG | HEART RATE: 67 BPM | TEMPERATURE: 98.4 F | HEIGHT: 70 IN | SYSTOLIC BLOOD PRESSURE: 128 MMHG | BODY MASS INDEX: 25.62 KG/M2 | WEIGHT: 179 LBS | OXYGEN SATURATION: 97 %

## 2019-10-24 DIAGNOSIS — I10 ESSENTIAL (PRIMARY) HYPERTENSION: ICD-10-CM

## 2019-10-24 DIAGNOSIS — K58.0 IRRITABLE BOWEL SYNDROME WITH DIARRHEA: ICD-10-CM

## 2019-10-24 DIAGNOSIS — Z12.5 ENCOUNTER FOR PROSTATE CANCER SCREENING: ICD-10-CM

## 2019-10-24 DIAGNOSIS — E78.01 FAMILIAL HYPERCHOLESTEROLEMIA: ICD-10-CM

## 2019-10-24 DIAGNOSIS — L30.9 DERMATITIS: ICD-10-CM

## 2019-10-24 DIAGNOSIS — F17.210 CIGARETTE NICOTINE DEPENDENCE WITHOUT COMPLICATION: ICD-10-CM

## 2019-10-24 DIAGNOSIS — Z23 NEED FOR SHINGLES VACCINE: ICD-10-CM

## 2019-10-24 DIAGNOSIS — Z00.00 ANNUAL PHYSICAL EXAM: Primary | ICD-10-CM

## 2019-10-24 DIAGNOSIS — J44.9 CHRONIC OBSTRUCTIVE PULMONARY DISEASE, UNSPECIFIED COPD TYPE (HCC): ICD-10-CM

## 2019-10-24 PROBLEM — R07.89 CHEST TIGHTNESS OR PRESSURE: Status: RESOLVED | Noted: 2018-10-23 | Resolved: 2019-10-24

## 2019-10-24 PROCEDURE — 99396 PREV VISIT EST AGE 40-64: CPT | Performed by: FAMILY MEDICINE

## 2019-10-24 PROCEDURE — 90750 HZV VACC RECOMBINANT IM: CPT | Performed by: FAMILY MEDICINE

## 2019-10-24 PROCEDURE — 90471 IMMUNIZATION ADMIN: CPT | Performed by: FAMILY MEDICINE

## 2019-10-24 RX ORDER — DIPHENOXYLATE HYDROCHLORIDE AND ATROPINE SULFATE 2.5; .025 MG/1; MG/1
TABLET ORAL
Qty: 180 TABLET | Refills: 3 | Status: SHIPPED | OUTPATIENT
Start: 2019-10-24 | End: 2020-04-27 | Stop reason: SDUPTHER

## 2019-10-24 RX ORDER — TRIAMCINOLONE ACETONIDE 5 MG/G
CREAM TOPICAL
Qty: 1 TUBE | Refills: 0 | Status: SHIPPED | OUTPATIENT
Start: 2019-10-24 | End: 2019-11-06 | Stop reason: SDUPTHER

## 2019-10-24 RX ORDER — SULFAMETHOXAZOLE AND TRIMETHOPRIM 800; 160 MG/1; MG/1
1 TABLET ORAL 2 TIMES DAILY
Qty: 20 TABLET | Refills: 0 | Status: SHIPPED | OUTPATIENT
Start: 2019-10-24 | End: 2019-11-03

## 2019-11-05 ENCOUNTER — PATIENT MESSAGE (OUTPATIENT)
Dept: FAMILY MEDICINE CLINIC | Age: 64
End: 2019-11-05

## 2019-11-06 RX ORDER — CLOTRIMAZOLE AND BETAMETHASONE DIPROPIONATE 10; .64 MG/G; MG/G
CREAM TOPICAL 3 TIMES DAILY
Qty: 30 G | Refills: 3 | Status: SHIPPED | OUTPATIENT
Start: 2019-11-06 | End: 2019-11-16

## 2019-11-06 RX ORDER — TRIAMCINOLONE ACETONIDE 5 MG/G
CREAM TOPICAL
Qty: 30 G | Refills: 3 | Status: SHIPPED | OUTPATIENT
Start: 2019-11-06 | End: 2021-07-28

## 2019-11-10 PROBLEM — L30.9 DERMATITIS: Status: ACTIVE | Noted: 2019-11-10

## 2019-11-10 ASSESSMENT — ENCOUNTER SYMPTOMS
COUGH: 0
CONSTIPATION: 0
DIARRHEA: 0
CHEST TIGHTNESS: 0
NAUSEA: 0
ABDOMINAL PAIN: 0
SHORTNESS OF BREATH: 0
ANAL BLEEDING: 0

## 2019-11-20 NOTE — PROGRESS NOTES
[facility]  MEDICINE  [facility] [facility]  [address] [facility] [address]  Dept: [phone]  Dept Fax: [phone]  Loc: [phone]    Elise Palmer is a 65 y.o. male who presents today for his medical conditions/complaints as noted below. Elise Palmer is c/o of 6 Month Follow-Up        HPI:       HPI    Past Medical History:   Diagnosis Date    Hyperlipidemia     Hypertension     IBS (irritable bowel syndrome)       Past Surgical History:   Procedure Laterality Date    APPENDECTOMY  1974    CYST REMOVAL  1990    neck    RECTAL SURGERY  10/2011    Exc Inclusion Cyst Rt Buttock, Perianal Area, Fistulotomy, Ext Hemorrhoids    SKIN CANCER EXCISION  8/2011    back of neck - precancerous       Family History   Problem Relation Age of Onset    High Blood Pressure Mother     Alzheimer's Disease Mother     High Blood Pressure Father     Cancer Father      lung       Social History   Substance Use Topics    Smoking status: Light Tobacco Smoker     Years: 30.00    Smokeless tobacco: Never Used    Alcohol use Yes      Comment: rarely      Current Outpatient Prescriptions   Medication Sig Dispense Refill    metoprolol succinate (TOPROL XL) 100 MG extended release tablet       diphenoxylate-atropine (LOMOTIL) 2.5-0.025 MG per tablet Take 1 tablet by mouth 2 times daily 180 tablet 0    atropine-PHENobarbital-scopolamine-hyoscyamine (DONNATAL) 16.2 MG per tablet Take 1 tablet by mouth daily 90 tablet 1    rosuvastatin (CRESTOR) 10 MG tablet TAKE 1 TABLET DAILY 90 tablet 3    Ascorbic Acid (VITAMIN C) 500 MG tablet Take 500 mg by mouth daily.  vitamin B-12 (CYANOCOBALAMIN) 100 MCG tablet Take 50 mcg by mouth daily.  irbesartan-hydrochlorothiazide (AVALIDE) 150-12.5 MG per tablet Take 1 tablet by mouth daily.  aspirin 81 MG EC tablet Take 81 mg by mouth daily.  propranolol (INDERAL LA) 60 MG CR capsule Take 60 mg by mouth daily.         phenobarbital-belladonna alkaloids 16 MG/5ML elixir Take 2 mLs by mouth every 6 hours. No current facility-administered medications for this visit. Allergies   Allergen Reactions    Dexamethasone      Severe hiccups placed in hospital for 3 days    Celebrex [Celecoxib] Rash       Health Maintenance   Topic Date Due    Hepatitis C screen  1955    HIV screen  01/31/1971    DTaP/Tdap/Td vaccine (1 - Tdap) 01/31/1975    Pneumococcal med risk (1 of 1 - PPSV23) 01/31/1975    Lipid screen  01/31/1996    Diabetes screen  01/31/1996    Colon cancer screen colonoscopy  01/31/2006    Zostavax vaccine  01/31/2016    Flu vaccine (1) 09/01/2017       Subjective:      Review of Systems      See HPI for visit specific review of symptoms. All others negative      Objective:   /80   Pulse 55   Resp 18   Ht 5' 10\" (1.778 m)   Wt 180 lb (81.6 kg)   SpO2 98%   BMI 25.83 kg/m²   Physical Exam      Lab Review   No results found for this or any previous visit (from the past 672 hour(s)). Assessment & Plan: The following diagnoses and conditions are stable with no further orders unless indicated:  José Miguel Garcia was seen today for 6 month follow-up. Diagnoses and all orders for this visit:    Familial hypercholesterolemia  -     Comprehensive Metabolic Panel; Future  -     Lipid Panel; Future  Continue same for now, will check labs. Essential (primary) hypertension  Blood pressure is stable. Continue current medications. Monitor ambulatory bp readings, if persistently >140/90, return to clinic. GERD without esophagitis  Continue same. Avoid food triggers. Irritable bowel syndrome, unspecified type  STable, refills given. UDS next visit. Radha Muñoz reviewed. Encouraged to avoid food triggers. Other orders  -     diphenoxylate-atropine (LOMOTIL) 2.5-0.025 MG per tablet;  Take 1 tablet by mouth 2 times daily  -     atropine-PHENobarbital-scopolamine-hyoscyamine (DONNATAL) 16.2 MG per no

## 2019-12-09 DIAGNOSIS — K58.9 IRRITABLE BOWEL SYNDROME, UNSPECIFIED TYPE: ICD-10-CM

## 2019-12-09 RX ORDER — PHENOBARBITAL 16.2 MG/1
16.2 TABLET ORAL DAILY
Qty: 90 TABLET | Refills: 0 | Status: SHIPPED | OUTPATIENT
Start: 2019-12-09 | End: 2019-12-09 | Stop reason: SDUPTHER

## 2019-12-10 ENCOUNTER — TELEPHONE (OUTPATIENT)
Dept: FAMILY MEDICINE CLINIC | Age: 64
End: 2019-12-10

## 2019-12-10 RX ORDER — PHENOBARBITAL 16.2 MG/1
16.2 TABLET ORAL DAILY
Qty: 30 TABLET | Refills: 0 | Status: SHIPPED | OUTPATIENT
Start: 2019-12-10 | End: 2020-01-10 | Stop reason: SDUPTHER

## 2019-12-16 RX ORDER — ROSUVASTATIN CALCIUM 10 MG/1
TABLET, COATED ORAL
Qty: 90 TABLET | Refills: 3 | Status: SHIPPED | OUTPATIENT
Start: 2019-12-16 | End: 2020-11-30

## 2020-01-06 RX ORDER — LOSARTAN POTASSIUM AND HYDROCHLOROTHIAZIDE 12.5; 1 MG/1; MG/1
TABLET ORAL
Qty: 90 TABLET | Refills: 1 | Status: SHIPPED | OUTPATIENT
Start: 2020-01-06 | End: 2020-06-29

## 2020-01-06 NOTE — TELEPHONE ENCOUNTER
Ana Lewis called to request a refill on his medication.       Last office visit : 10/24/2019   Next office visit : 2/17/2020     Requested Prescriptions     Signed Prescriptions Disp Refills    losartan-hydrochlorothiazide (HYZAAR) 100-12.5 MG per tablet 90 tablet 1     Sig: TAKE 1 TABLET DAILY     Authorizing Provider: Roxana Corea     Ordering User: Priscilla Durant

## 2020-01-08 ENCOUNTER — NURSE ONLY (OUTPATIENT)
Dept: FAMILY MEDICINE CLINIC | Age: 65
End: 2020-01-08
Payer: COMMERCIAL

## 2020-01-08 PROCEDURE — 80305 DRUG TEST PRSMV DIR OPT OBS: CPT | Performed by: FAMILY MEDICINE

## 2020-01-09 LAB
AMPHETAMINE SCREEN, URINE: NORMAL
BARBITURATE SCREEN, URINE: NORMAL
BENZODIAZEPINE SCREEN, URINE: NORMAL
BUPRENORPHINE URINE: NORMAL
COCAINE METABOLITE SCREEN URINE: NORMAL
GABAPENTIN SCREEN, URINE: NORMAL
MDMA URINE: NORMAL
METHADONE SCREEN, URINE: NORMAL
METHAMPHETAMINE, URINE: NORMAL
OPIATE SCREEN URINE: NORMAL
OXYCODONE SCREEN URINE: NORMAL
PHENCYCLIDINE SCREEN URINE: NORMAL
PROPOXYPHENE SCREEN, URINE: NORMAL
THC SCREEN, URINE: NORMAL
TRICYCLIC ANTIDEPRESSANTS, UR: NORMAL

## 2020-01-20 RX ORDER — METOPROLOL SUCCINATE 50 MG/1
TABLET, EXTENDED RELEASE ORAL
Qty: 90 TABLET | Refills: 3 | Status: SHIPPED | OUTPATIENT
Start: 2020-01-20 | End: 2020-12-27

## 2020-02-03 RX ORDER — BUPROPION HYDROCHLORIDE 150 MG/1
TABLET, EXTENDED RELEASE ORAL
Qty: 90 TABLET | Refills: 3 | Status: SHIPPED | OUTPATIENT
Start: 2020-02-03 | End: 2021-01-20 | Stop reason: SDUPTHER

## 2020-02-07 RX ORDER — CLOTRIMAZOLE AND BETAMETHASONE DIPROPIONATE 10; .64 MG/G; MG/G
CREAM TOPICAL
Qty: 15 TUBE | Refills: 1 | Status: SHIPPED | OUTPATIENT
Start: 2020-02-07 | End: 2020-04-06

## 2020-02-11 DIAGNOSIS — E78.01 FAMILIAL HYPERCHOLESTEROLEMIA: ICD-10-CM

## 2020-02-11 DIAGNOSIS — Z12.5 ENCOUNTER FOR PROSTATE CANCER SCREENING: ICD-10-CM

## 2020-02-11 LAB
ALBUMIN SERPL-MCNC: 4 G/DL (ref 3.5–5.2)
ALP BLD-CCNC: 66 U/L (ref 40–130)
ALT SERPL-CCNC: 25 U/L (ref 5–41)
ANION GAP SERPL CALCULATED.3IONS-SCNC: 11 MMOL/L (ref 7–19)
AST SERPL-CCNC: 19 U/L (ref 5–40)
BILIRUB SERPL-MCNC: 0.3 MG/DL (ref 0.2–1.2)
BUN BLDV-MCNC: 12 MG/DL (ref 8–23)
CALCIUM SERPL-MCNC: 9.2 MG/DL (ref 8.8–10.2)
CHLORIDE BLD-SCNC: 104 MMOL/L (ref 98–111)
CHOLESTEROL, TOTAL: 138 MG/DL (ref 160–199)
CO2: 29 MMOL/L (ref 22–29)
CREAT SERPL-MCNC: 1 MG/DL (ref 0.5–1.2)
GFR NON-AFRICAN AMERICAN: >60
GLUCOSE BLD-MCNC: 95 MG/DL (ref 74–109)
HDLC SERPL-MCNC: 47 MG/DL (ref 55–121)
LDL CHOLESTEROL CALCULATED: 66 MG/DL
POTASSIUM SERPL-SCNC: 3.6 MMOL/L (ref 3.5–5)
PROSTATE SPECIFIC ANTIGEN: 0.69 NG/ML (ref 0–4)
SODIUM BLD-SCNC: 144 MMOL/L (ref 136–145)
TOTAL PROTEIN: 6.7 G/DL (ref 6.6–8.7)
TRIGL SERPL-MCNC: 126 MG/DL (ref 0–149)

## 2020-02-17 ENCOUNTER — OFFICE VISIT (OUTPATIENT)
Dept: FAMILY MEDICINE CLINIC | Age: 65
End: 2020-02-17
Payer: COMMERCIAL

## 2020-02-17 VITALS
WEIGHT: 189 LBS | SYSTOLIC BLOOD PRESSURE: 122 MMHG | OXYGEN SATURATION: 98 % | DIASTOLIC BLOOD PRESSURE: 78 MMHG | BODY MASS INDEX: 27.06 KG/M2 | HEART RATE: 55 BPM | HEIGHT: 70 IN | TEMPERATURE: 97.9 F

## 2020-02-17 PROCEDURE — 99214 OFFICE O/P EST MOD 30 MIN: CPT | Performed by: FAMILY MEDICINE

## 2020-02-17 ASSESSMENT — PATIENT HEALTH QUESTIONNAIRE - PHQ9
1. LITTLE INTEREST OR PLEASURE IN DOING THINGS: 0
SUM OF ALL RESPONSES TO PHQ9 QUESTIONS 1 & 2: 0
2. FEELING DOWN, DEPRESSED OR HOPELESS: 0
SUM OF ALL RESPONSES TO PHQ QUESTIONS 1-9: 0
SUM OF ALL RESPONSES TO PHQ QUESTIONS 1-9: 0

## 2020-02-17 NOTE — PROGRESS NOTES
Dr. Megan Oakley  10/2011    Exc Inclusion Cyst Rt Buttock, Perianal Area, Fistulotomy, Ext Hemorrhoids    SKIN CANCER EXCISION  8/2011    back of neck - precancerous    UPPER GASTROINTESTINAL ENDOSCOPY  2010    Dr. Js Valverde- Esophagous stretched. Family History   Problem Relation Age of Onset    High Blood Pressure Mother     Alzheimer's Disease Mother     High Blood Pressure Father     Cancer Father         lung       Social History     Tobacco Use    Smoking status: Light Tobacco Smoker     Packs/day: 0.50     Years: 30.00     Pack years: 15.00    Smokeless tobacco: Never Used   Substance Use Topics    Alcohol use: Yes     Comment: rarely      Current Outpatient Medications   Medication Sig Dispense Refill    clotrimazole-betamethasone (LOTRISONE) 1-0.05 % cream Apply to affected areas 2-3 times per day. 15 Tube 1    buPROPion (WELLBUTRIN SR) 150 MG extended release tablet TAKE 1 TABLET DAILY 90 tablet 3    metoprolol succinate (TOPROL XL) 50 MG extended release tablet TAKE 1 TABLET DAILY 90 tablet 3    PHENobarbital (LUMINAL) 16.2 MG tablet Take 1 tablet by mouth daily for 90 days. 90 tablet 0    losartan-hydrochlorothiazide (HYZAAR) 100-12.5 MG per tablet TAKE 1 TABLET DAILY 90 tablet 1    rosuvastatin (CRESTOR) 10 MG tablet TAKE 1 TABLET DAILY 90 tablet 3    diphenoxylate-atropine (LOMOTIL) 2.5-0.025 MG per tablet TAKE 1 TABLET TWICE A  tablet 3    Ascorbic Acid (VITAMIN C) 500 MG tablet Take 500 mg by mouth daily.  vitamin B-12 (CYANOCOBALAMIN) 100 MCG tablet Take 50 mcg by mouth daily.  aspirin 81 MG EC tablet Take 81 mg by mouth daily.  hyoscyamine (ANASPAZ;LEVSIN) 125 MCG tablet TAKE 1 TABLET BY MOUTH DAILY 90 tablet 3     No current facility-administered medications for this visit.       Allergies   Allergen Reactions    Dexamethasone      Severe hiccups placed in hospital for 3 days    Celebrex [Celecoxib] Rash       Health Maintenance   Topic Date Due    Hepatitis C screen  1955    Pneumococcal 0-64 years Vaccine (1 of 1 - PPSV23) 01/31/1962    HIV screen  01/31/1971    Colon cancer screen colonoscopy  11/11/2015    Flu vaccine (1) 10/24/2020 (Originally 9/1/2019)    Lipid screen  02/11/2021    Potassium monitoring  02/11/2021    Creatinine monitoring  02/11/2021    DTaP/Tdap/Td vaccine (2 - Td) 03/30/2025    Shingles Vaccine  Completed    Hepatitis A vaccine  Aged Out    Hepatitis B vaccine  Aged Out    Hib vaccine  Aged Out    Meningococcal (ACWY) vaccine  Aged Out       Subjective:     Review of Systems   Constitutional: Negative for chills and fever. HENT: Negative for congestion. Respiratory: Negative for cough, chest tightness and shortness of breath. Cardiovascular: Negative for chest pain, palpitations and leg swelling. Gastrointestinal: Negative for abdominal pain, anal bleeding, constipation, diarrhea and nausea. Genitourinary: Negative for difficulty urinating. Psychiatric/Behavioral: Negative. See HPI for visit specific review of symptoms. All others negative      Objective:   /78   Pulse 55   Temp 97.9 °F (36.6 °C)   Ht 5' 10\" (1.778 m)   Wt 189 lb (85.7 kg)   SpO2 98%   BMI 27.12 kg/m²    Physical Exam  Constitutional:       Appearance: He is well-developed. He is not ill-appearing. Eyes:      Pupils: Pupils are equal, round, and reactive to light. Neck:      Musculoskeletal: Normal range of motion and neck supple. Cardiovascular:      Rate and Rhythm: Normal rate and regular rhythm. Heart sounds: No murmur. No friction rub. Pulmonary:      Effort: Pulmonary effort is normal. No respiratory distress. Breath sounds: Normal breath sounds. No wheezing or rales. Abdominal:      General: Bowel sounds are normal. There is no distension. Palpations: Abdomen is soft. Tenderness: There is no abdominal tenderness. 5/17/2020). Discussed use, benefit, and side effects of prescribed medications. All patient questions answered. Pt voiced understanding. Reviewed health maintenance. Instructed to continue current medications, diet and exercise. Patient agreedwith treatment plan. Follow up as directed.

## 2020-02-26 ENCOUNTER — OFFICE VISIT (OUTPATIENT)
Dept: GASTROENTEROLOGY | Age: 65
End: 2020-02-26
Payer: COMMERCIAL

## 2020-02-26 VITALS
WEIGHT: 190.4 LBS | DIASTOLIC BLOOD PRESSURE: 72 MMHG | HEART RATE: 64 BPM | OXYGEN SATURATION: 98 % | BODY MASS INDEX: 27.26 KG/M2 | HEIGHT: 70 IN | SYSTOLIC BLOOD PRESSURE: 122 MMHG

## 2020-02-26 PROBLEM — K58.2 MIXED IRRITABLE BOWEL SYNDROME: Status: ACTIVE | Noted: 2020-02-26

## 2020-02-26 PROBLEM — R12 CHRONIC HEARTBURN: Status: ACTIVE | Noted: 2020-02-26

## 2020-02-26 PROBLEM — R13.10 DYSPHAGIA: Status: ACTIVE | Noted: 2020-02-26

## 2020-02-26 PROBLEM — R19.8 ALTERNATING CONSTIPATION AND DIARRHEA: Status: ACTIVE | Noted: 2020-02-26

## 2020-02-26 PROCEDURE — 99204 OFFICE O/P NEW MOD 45 MIN: CPT | Performed by: NURSE PRACTITIONER

## 2020-02-26 ASSESSMENT — ENCOUNTER SYMPTOMS
BACK PAIN: 0
BLOOD IN STOOL: 0
VOMITING: 0
SHORTNESS OF BREATH: 0
ABDOMINAL PAIN: 0
NAUSEA: 0
VOICE CHANGE: 0
TROUBLE SWALLOWING: 1
SORE THROAT: 0
PHOTOPHOBIA: 0
CONSTIPATION: 1
ABDOMINAL DISTENTION: 0
ANAL BLEEDING: 0
DIARRHEA: 1
COUGH: 0
RECTAL PAIN: 0

## 2020-02-26 NOTE — PATIENT INSTRUCTIONS
You are going to have a colonoscopy and here are some instructions: You will be given specific directions regarding restrictions to diet and bowel prep instructions including laxatives. Please read these instructions one week prior to your scheduled procedure to ensure that you are prepared. Follow prep instructions provided for bowel prep. Take all of the bowel prep as directed. If you are having problems with nausea, stop your prep for 30-45 min to allow the nausea to subside before resuming your prep. Nothing to eat or drink after midnight the day of the procedure EXCEPT:  PLEASE TAKE MEDICATION(S) FOR HIGH BLOOD PRESSURE, THYROID, SEIZURES, AND HEART THE MORNING OF THE PROCEDURE WITH A SIP OF WATER  AT LEAST 2 HOURS PRIOR TO ARRIVAL TIME.   YOU MAY ALSO TAKE ANY INHALERS YOU ARE PRESCRIBED. You will not be able to drive for 24 hours after the procedure due to sedation. Bring a  with you the day of the procedure. No aspirin, ibuprofen, naproxen, fish oil or vitamin E for 5 days before procedure. If you are on blood thinners, clearance from the prescribing physician will be obtained before your procedure is scheduled. Increased [email or web address] may be associated with discontinuation of your blood thinner and include, but not limited to, stroke, TIA, or cardiac event. If polyps are removed during the procedure they will be sent to a pathologist for analysis. You will be notified by mail of the pathology results in 2-3 weeks. Your physician may also schedule a follow up appointment with the nurse practitioner to discuss pathology, symptoms or to check if you have had any problems related to your procedure. If you prefer not to return to the office after your procedure please discuss this with your physician on the day of your colonoscopy. Final recommendations are based on the pathologist report.      Your diet before a colonoscopy bowel preparation is very important to ensure a Continue current medications. If you are on blood thinners, clearance from the prescribing physician will be obtained before your procedure is scheduled. [email or web address] may be associated with discontinuation of your blood thinner and include, but not limited to, stroke, TIA, or cardiac event. If biopsies are taken during the procedure they will be sent to a pathologist for analysis. You will be notified by mail of the pathology results in 2-3 weeks. Your physician may also schedule a follow up appointment with the nurse practitioner to discuss pathology, symptoms or to check if you have had any problems related to your procedure. If you prefer not to return to the office after your procedure please discuss this with your physician on the day of your procedure.

## 2020-02-26 NOTE — PROGRESS NOTES
Subjective:      Levester Mcardle is a60 y.o. male  Chief Complaint   Patient presents with    New Patient     from PCP for heartburn       HPI  PCP: Ranjit Cordero MD  Referring Provider: Emiliano Ordoñez MD  Pt is a new pt referred for c/o heartburn. Pt reports this is chronic for 40+ years. Currently on OTC nexium daily. And this works fairly well to control it. Has tried RX Nexium and this helped better, but he cant afford it. The Nexium copay cards dont work with his insurance. He has tried and failed protonix and prilosec in the past.    C/o dysphagia. Noted with foods only. Chronic for 40 years, waxes and wanes. Reports a hx of esophageal stricture with dil in the past, and this helped. The dysphagia started back about 6 months ago. Pt reports he has alternating constipation with diarrhea. Chronic for many years. Was dx with IBS. Reports he uses lomotil and belladona and hyoscamine daily as prescribed by his PCP and this regimen works best for him. Wants to have a colonoscopy. Not sure when his last colonoscopy was, but thinks it was 8-10 years ago and it was normal.     GI scope reports in history per MA per OV policy, I reviewed this. Family HX:    Pt denies family hx of colon polyps, colon CA, inflammatory bowel dx, gastric CA and esophageal CA. Past Medical History:   Diagnosis Date    Hyperlipidemia     Hypertension     IBS (irritable bowel syndrome)           Past Surgical History:   Procedure Laterality Date    APPENDECTOMY  1974    COLONOSCOPY  2012    Dr. Ansley Farrell  10/2011    Exc Inclusion Cyst Rt Buttock, Perianal Area, Fistulotomy, Ext Hemorrhoids    SKIN CANCER EXCISION  8/2011    back of neck - precancerous    UPPER GASTROINTESTINAL ENDOSCOPY  2010    Dr. Kye Jackson- Esophagous stretched.        Social History     Socioeconomic History    Marital status:      Spouse name: None    Number of children: None    Years of education: None    Highest education level: None   Occupational History    None   Social Needs    Financial resource strain: None    Food insecurity:     Worry: None     Inability: None    Transportation needs:     Medical: None     Non-medical: None   Tobacco Use    Smoking status: Light Tobacco Smoker     Packs/day: 0.50     Years: 30.00     Pack years: 15.00    Smokeless tobacco: Never Used   Substance and Sexual Activity    Alcohol use: Yes     Comment: rarely    Drug use: No    Sexual activity: None   Lifestyle    Physical activity:     Days per week: None     Minutes per session: None    Stress: None   Relationships    Social connections:     Talks on phone: None     Gets together: None     Attends Taoist service: None     Active member of club or organization: None     Attends meetings of clubs or organizations: None     Relationship status: None    Intimate partner violence:     Fear of current or ex partner: None     Emotionally abused: None     Physically abused: None     Forced sexual activity: None   Other Topics Concern    None   Social History Narrative    None       Allergies   Allergen Reactions    Dexamethasone      Severe hiccups placed in hospital for 3 days    Celebrex [Celecoxib] Rash       Current Outpatient Medications   Medication Sig Dispense Refill    clotrimazole-betamethasone (LOTRISONE) 1-0.05 % cream Apply to affected areas 2-3 times per day. 15 Tube 1    buPROPion (WELLBUTRIN SR) 150 MG extended release tablet TAKE 1 TABLET DAILY 90 tablet 3    metoprolol succinate (TOPROL XL) 50 MG extended release tablet TAKE 1 TABLET DAILY 90 tablet 3    PHENobarbital (LUMINAL) 16.2 MG tablet Take 1 tablet by mouth daily for 90 days.  90 tablet 0    losartan-hydrochlorothiazide (HYZAAR) 100-12.5 MG per tablet TAKE 1 TABLET DAILY 90 tablet 1    rosuvastatin (CRESTOR) 10 MG tablet TAKE 1 TABLET DAILY 90 tablet 3    diphenoxylate-atropine (LOMOTIL) 2.5-0.025 MG per E or NSAIDs 5 (five) days before procedure. Follow-up Visit: per Dr Orlando Lino  Pt education:  Risks, benefits, and alternatives to colonoscopy were discussed. Risks of colonoscopy include, but are not limited to, perforation, bleeding, and infection. We discussed that the risk for perforation is 1-3 in 5,000  at the time of colonoscopy;   and 1-2% risk of bleeding post-polypectomy. All questions answered to the satisfaction of the patient. Pt is agreeable to proceed.   4. Chris notified to get last EGD/colon reports/path from previous GI

## 2020-02-27 PROBLEM — R12 CHRONIC HEARTBURN: Status: RESOLVED | Noted: 2020-02-26 | Resolved: 2020-02-27

## 2020-02-27 ASSESSMENT — ENCOUNTER SYMPTOMS
SHORTNESS OF BREATH: 0
NAUSEA: 0
COUGH: 0
CONSTIPATION: 0
ABDOMINAL PAIN: 0
ANAL BLEEDING: 0
DIARRHEA: 0
CHEST TIGHTNESS: 0

## 2020-03-19 ENCOUNTER — TELEPHONE (OUTPATIENT)
Dept: GASTROENTEROLOGY | Age: 65
End: 2020-03-19

## 2020-03-31 ENCOUNTER — PATIENT MESSAGE (OUTPATIENT)
Dept: FAMILY MEDICINE CLINIC | Age: 65
End: 2020-03-31

## 2020-04-02 RX ORDER — PHENOBARBITAL 16.2 MG/1
16.2 TABLET ORAL DAILY
Qty: 90 TABLET | Refills: 0 | Status: SHIPPED | OUTPATIENT
Start: 2020-04-02 | End: 2020-06-25 | Stop reason: SDUPTHER

## 2020-04-06 RX ORDER — CLOTRIMAZOLE AND BETAMETHASONE DIPROPIONATE 10; .64 MG/G; MG/G
CREAM TOPICAL
Qty: 15 TUBE | Refills: 1 | Status: SHIPPED | OUTPATIENT
Start: 2020-04-06 | End: 2020-04-28 | Stop reason: SDUPTHER

## 2020-04-26 ENCOUNTER — PATIENT MESSAGE (OUTPATIENT)
Dept: FAMILY MEDICINE CLINIC | Age: 65
End: 2020-04-26

## 2020-04-27 RX ORDER — DIPHENOXYLATE HYDROCHLORIDE AND ATROPINE SULFATE 2.5; .025 MG/1; MG/1
TABLET ORAL
Qty: 180 TABLET | Refills: 0 | Status: SHIPPED | OUTPATIENT
Start: 2020-04-27 | End: 2020-07-15 | Stop reason: SDUPTHER

## 2020-04-28 RX ORDER — TRIAMCINOLONE ACETONIDE 5 MG/G
CREAM TOPICAL
Qty: 1 TUBE | Refills: 3 | Status: SHIPPED | OUTPATIENT
Start: 2020-04-28 | End: 2020-05-05

## 2020-04-28 RX ORDER — CLOTRIMAZOLE AND BETAMETHASONE DIPROPIONATE 10; .64 MG/G; MG/G
CREAM TOPICAL
Qty: 1 TUBE | Refills: 3 | Status: SHIPPED | OUTPATIENT
Start: 2020-04-28

## 2020-06-10 ENCOUNTER — E-VISIT (OUTPATIENT)
Dept: FAMILY MEDICINE CLINIC | Age: 65
End: 2020-06-10
Payer: COMMERCIAL

## 2020-06-10 PROCEDURE — 99421 OL DIG E/M SVC 5-10 MIN: CPT | Performed by: FAMILY MEDICINE

## 2020-06-10 NOTE — PROGRESS NOTES
I have reviewed evisit documentation, just states hernia new since last visit. He will need to schedule appt to discuss this and/or provide more information as to his question.

## 2020-06-25 NOTE — TELEPHONE ENCOUNTER
Ziyad requests that Mike return their call. The best time to reach him is Anytime. Pt is calling to see if he can get the (PHENOBARBITAL) 16.2 sent over to [facility] on [address]. Also he is wanting a 90 day supply. Thank you.

## 2020-06-26 RX ORDER — PHENOBARBITAL 16.2 MG/1
16.2 TABLET ORAL DAILY
Qty: 90 TABLET | Refills: 0 | Status: SHIPPED | OUTPATIENT
Start: 2020-06-26 | End: 2020-10-13 | Stop reason: SDUPTHER

## 2020-06-26 NOTE — TELEPHONE ENCOUNTER
Lolis Deuce called to request a refill on his medication. Last office visit : 2020   Next office visit : 7/15/2020     Last UDS:   Amphetamine Screen, Urine   Date Value Ref Range Status   2020 neg  Final     Barbiturate Screen, Urine   Date Value Ref Range Status   2020 pos  Final     Benzodiazepine Screen, Urine   Date Value Ref Range Status   2020 neg  Final     Buprenorphine Urine   Date Value Ref Range Status   2020 neg  Final     Cocaine Metabolite Screen, Urine   Date Value Ref Range Status   2020 neg  Final     Gabapentin Screen, Urine   Date Value Ref Range Status   2020 neg  Final     MDMA, Urine   Date Value Ref Range Status   2020 neg  Final     Methamphetamine, Urine   Date Value Ref Range Status   2020 neg  Final     Opiate Scrn, Ur   Date Value Ref Range Status   2020 neg  Final     Oxycodone Screen, Ur   Date Value Ref Range Status   2020 neg  Final     PCP Screen, Urine   Date Value Ref Range Status   2020 neg  Final     Propoxyphene Screen, Urine   Date Value Ref Range Status   2020 neg  Final     THC Screen, Urine   Date Value Ref Range Status   2020 neg  Final     Tricyclic Antidepressants, Urine   Date Value Ref Range Status   2020 neg  Final       Last Winkler In20  Medication Contract: 17   Last Fill: 20    Requested Prescriptions     Pending Prescriptions Disp Refills    PHENobarbital (LUMINAL) 16.2 MG tablet 90 tablet 0     Sig: Take 1 tablet by mouth daily for 90 days. Please approve or refuse this medication.    Iva Willis

## 2020-06-29 RX ORDER — LOSARTAN POTASSIUM AND HYDROCHLOROTHIAZIDE 12.5; 1 MG/1; MG/1
TABLET ORAL
Qty: 90 TABLET | Refills: 1 | Status: SHIPPED | OUTPATIENT
Start: 2020-06-29 | End: 2020-12-27

## 2020-07-15 ENCOUNTER — OFFICE VISIT (OUTPATIENT)
Dept: FAMILY MEDICINE CLINIC | Age: 65
End: 2020-07-15
Payer: COMMERCIAL

## 2020-07-15 VITALS
HEIGHT: 70 IN | DIASTOLIC BLOOD PRESSURE: 70 MMHG | HEART RATE: 78 BPM | BODY MASS INDEX: 23.62 KG/M2 | OXYGEN SATURATION: 98 % | SYSTOLIC BLOOD PRESSURE: 108 MMHG | TEMPERATURE: 97.6 F | WEIGHT: 165 LBS | RESPIRATION RATE: 18 BRPM

## 2020-07-15 PROCEDURE — 99214 OFFICE O/P EST MOD 30 MIN: CPT | Performed by: NURSE PRACTITIONER

## 2020-07-15 RX ORDER — DIPHENOXYLATE HYDROCHLORIDE AND ATROPINE SULFATE 2.5; .025 MG/1; MG/1
TABLET ORAL
Qty: 180 TABLET | Refills: 0 | Status: SHIPPED | OUTPATIENT
Start: 2020-07-15 | End: 2020-10-23 | Stop reason: SDUPTHER

## 2020-07-15 RX ORDER — ESOMEPRAZOLE MAGNESIUM 40 MG/1
40 CAPSULE, DELAYED RELEASE ORAL
Qty: 30 CAPSULE | Refills: 5 | Status: SHIPPED | OUTPATIENT
Start: 2020-07-15

## 2020-07-15 ASSESSMENT — ENCOUNTER SYMPTOMS
DIARRHEA: 0
EYE PAIN: 0
CHEST TIGHTNESS: 0
SORE THROAT: 0
NAUSEA: 0
ABDOMINAL PAIN: 0
WHEEZING: 0
COUGH: 0
SHORTNESS OF BREATH: 0

## 2020-07-15 NOTE — PROGRESS NOTES
Andria Paz is a 60 y.o. male who presents today for  Chief Complaint   Patient presents with    Follow-up       HPI:  Hypertension  Compliant with medications. No adverse effects from medication. No lightheadedness, palpitations, or chest pain. SBP typically 110-115. GERD has been uncontrolled at times. Still with occasional dysphagia, not daily. He is taking otc nexium with minimal improvement. Insurance would not cover Rx Nexium in the past.  He has tried and failed Protonix and Prilosec. He saw GI, EGD and screening cscope scheduled but cancelled due to covid. He has not rescheduled yet. He has h/o EGD with dilation 8-10 years ago. IBS is controlled with Lomotil, belladonna, hyoscyamine. He has had intermittent groin swelling for the past 6 months, feels like a hernia. No past hernia. Symptoms occur when he does moderate to heavy lifting at work. He has a protrusion in the right inguinal area, manually reducible. No significant pain but does have mild discomfort when it occurs. No urinary issues. Review of Systems   Constitutional: Negative for chills and fever. HENT: Negative for congestion, ear pain and sore throat. Eyes: Negative for pain and visual disturbance. Respiratory: Negative for cough, chest tightness, shortness of breath and wheezing. Cardiovascular: Negative for chest pain. Gastrointestinal: Negative for abdominal pain, diarrhea and nausea. GERD, dysphagia   Genitourinary: Negative for dysuria, frequency, penile pain, penile swelling and testicular pain. Inguinal hernia R   Musculoskeletal: Negative for arthralgias and myalgias. Skin: Negative for rash. Neurological: Negative for dizziness and light-headedness.        Past Medical History:   Diagnosis Date    Hyperlipidemia     Hypertension     IBS (irritable bowel syndrome)        Current Outpatient Medications   Medication Sig Dispense Refill    esomeprazole (NEXIUM) 40 MG delayed release capsule Take 1 capsule by mouth every morning (before breakfast) 30 capsule 5    diphenoxylate-atropine (LOMOTIL) 2.5-0.025 MG per tablet TAKE 1 TABLET TWICE A  tablet 0    losartan-hydroCHLOROthiazide (HYZAAR) 100-12.5 MG per tablet TAKE 1 TABLET DAILY 90 tablet 1    PHENobarbital (LUMINAL) 16.2 MG tablet Take 1 tablet by mouth daily for 90 days. 90 tablet 0    buPROPion (WELLBUTRIN SR) 150 MG extended release tablet TAKE 1 TABLET DAILY 90 tablet 3    metoprolol succinate (TOPROL XL) 50 MG extended release tablet TAKE 1 TABLET DAILY 90 tablet 3    rosuvastatin (CRESTOR) 10 MG tablet TAKE 1 TABLET DAILY 90 tablet 3    hyoscyamine (ANASPAZ;LEVSIN) 125 MCG tablet TAKE 1 TABLET BY MOUTH DAILY 90 tablet 3    Ascorbic Acid (VITAMIN C) 500 MG tablet Take 500 mg by mouth daily.  vitamin B-12 (CYANOCOBALAMIN) 100 MCG tablet Take 50 mcg by mouth daily.  aspirin 81 MG EC tablet Take 81 mg by mouth daily.  clotrimazole-betamethasone (LOTRISONE) 1-0.05 % cream APPLY TO AFFECTED AREA  2 TO 3 TIMES PER DAY 1 Tube 3     No current facility-administered medications for this visit. Allergies   Allergen Reactions    Dexamethasone      Severe hiccups placed in hospital for 3 days    Celebrex [Celecoxib] Rash       Past Surgical History:   Procedure Laterality Date    APPENDECTOMY  1974    COLONOSCOPY  2012    Dr. C[facility]o [facility]  10/2011    Exc Inclusion Cyst Rt Buttock, Perianal Area, Fistulotomy, Ext Hemorrhoids    SKIN CANCER EXCISION  8/2011    back of neck - precancerous    UPPER GASTROINTESTINAL ENDOSCOPY  2010    Dr. Josy Blas- Esophagous stretched.        Social History     Tobacco Use    Smoking status: Light Tobacco Smoker     Packs/day: 0.50     Years: 30.00     Pack years: 15.00    Smokeless tobacco: Never Used   Substance Use Topics    Alcohol use: Yes     Comment: rarely    Drug use: No       Family History   Problem Relation Age of Onset    High Blood Pressure Mother     Alzheimer's Disease Mother     High Blood Pressure Father     Cancer Father         lung       /70   Pulse 78   Temp 97.6 °F (36.4 °C) (Temporal)   Resp 18   Ht 5' 10\" (1.778 m)   Wt 165 lb (74.8 kg)   SpO2 98%   BMI 23.68 kg/m²     Physical Exam  Vitals signs reviewed. Constitutional:       General: He is not in acute distress. Appearance: Normal appearance. He is well-developed. HENT:      Head: Normocephalic. Eyes:      Conjunctiva/sclera: Conjunctivae normal.      Pupils: Pupils are equal, round, and reactive to light. Neck:      Musculoskeletal: Normal range of motion and neck supple. Thyroid: No thyromegaly. Vascular: No carotid bruit or JVD. Trachea: No tracheal deviation. Cardiovascular:      Rate and Rhythm: Normal rate and regular rhythm. Heart sounds: Normal heart sounds. No murmur. Pulmonary:      Effort: Pulmonary effort is normal. No respiratory distress. Breath sounds: Normal breath sounds. Genitourinary:     Penis: Normal.       Comments: R inguinal region with mild swelling, small hernia, reducible. No significant tenderness to palpation. Musculoskeletal: Normal range of motion. Lymphadenopathy:      Cervical: No cervical adenopathy. Skin:     General: Skin is warm and dry. Findings: No rash. Neurological:      Mental Status: He is alert. Psychiatric:         Mood and Affect: Mood normal.         Behavior: Behavior normal.         Thought Content: Thought content normal.         ASSESSMENT/PLAN:  1. Essential (primary) hypertension  - Stable, controlled. Continue current medication. 2. Non-recurrent unilateral inguinal hernia without obstruction or gangrene  - Discussed general surgery referral.  He will hold off at this time. Symptoms are manageable. - Discussed that persistent heavy lifting can exacerbate symptoms. He will monitor for any acute worsening. Urgent symptoms reviewed, instructed ER if any irreducible hernia symptoms. 3. GERD without esophagitis  - We will send Rx for Nexium 40 mg daily. - Dysphasia is stable, not daily. He would prefer to hold off on EGD and C scope at this time due to Helen. If symptoms worsen he will notify GI.    4. Irritable bowel syndrome with diarrhea  - Stable, controlled. Continue current medication.  - diphenoxylate-atropine (LOMOTIL) 2.5-0.025 MG per tablet; TAKE 1 TABLET TWICE A DAY  Dispense: 180 tablet; Refill: 0         Return in about 3 months (around 10/15/2020) for follow up. Karma Dorantes was seen today for follow-up. Diagnoses and all orders for this visit:    Essential (primary) hypertension    Non-recurrent unilateral inguinal hernia without obstruction or gangrene    GERD without esophagitis    Irritable bowel syndrome with diarrhea  -     diphenoxylate-atropine (LOMOTIL) 2.5-0.025 MG per tablet; TAKE 1 TABLET TWICE A DAY    Other orders  -     esomeprazole (NEXIUM) 40 MG delayed release capsule; Take 1 capsule by mouth every morning (before breakfast)      Medications Discontinued During This Encounter   Medication Reason    diphenoxylate-atropine (LOMOTIL) 2.5-0.025 MG per tablet REORDER     There are no Patient Instructions on file for this visit. Patient voicesunderstanding and agrees to plans along with risks and benefits of plan. Counseling:  Ricky Mobley's case, medications and options were discussed in detail. Patient was instructed to call the office if he questionsregarding him treatment. Should him conditions worsen, he should return to office to be reassessed by OMAR Kim. he Should to go the closest Emergency Department for any emergency. They verbalizedunderstanding the above instructions. Return in about 3 months (around 10/15/2020) for follow up.

## 2020-10-13 ENCOUNTER — PATIENT MESSAGE (OUTPATIENT)
Dept: FAMILY MEDICINE CLINIC | Age: 65
End: 2020-10-13

## 2020-10-13 RX ORDER — PHENOBARBITAL 16.2 MG/1
16.2 TABLET ORAL DAILY
Qty: 90 TABLET | Refills: 0 | Status: SHIPPED | OUTPATIENT
Start: 2020-10-13 | End: 2020-10-14 | Stop reason: SDUPTHER

## 2020-10-13 NOTE — TELEPHONE ENCOUNTER
Glenda Ac called to request a refill on his medication. Last office visit : 7/15/2020   Next office visit : 10/22/2020     Last UDS:   Amphetamine Screen, Urine   Date Value Ref Range Status   01/09/2020 neg  Final     Barbiturate Screen, Urine   Date Value Ref Range Status   01/09/2020 pos  Final     Benzodiazepine Screen, Urine   Date Value Ref Range Status   01/09/2020 neg  Final     Buprenorphine Urine   Date Value Ref Range Status   01/09/2020 neg  Final     Cocaine Metabolite Screen, Urine   Date Value Ref Range Status   01/09/2020 neg  Final     Gabapentin Screen, Urine   Date Value Ref Range Status   01/09/2020 neg  Final     MDMA, Urine   Date Value Ref Range Status   01/09/2020 neg  Final     Methamphetamine, Urine   Date Value Ref Range Status   01/09/2020 neg  Final     Opiate Scrn, Ur   Date Value Ref Range Status   01/09/2020 neg  Final     Oxycodone Screen, Ur   Date Value Ref Range Status   01/09/2020 neg  Final     PCP Screen, Urine   Date Value Ref Range Status   01/09/2020 neg  Final     Propoxyphene Screen, Urine   Date Value Ref Range Status   01/09/2020 neg  Final     THC Screen, Urine   Date Value Ref Range Status   01/09/2020 neg  Final     Tricyclic Antidepressants, Urine   Date Value Ref Range Status   01/09/2020 neg  Final       Last Brendan Gasjeremy: today  Medication Contract: at next visit    Last Fill: 06/26/20    Requested Prescriptions     Pending Prescriptions Disp Refills    PHENobarbital (LUMINAL) 16.2 MG tablet 90 tablet 0     Sig: Take 1 tablet by mouth daily for 90 days. Please approve or refuse this medication.    Ramona Tyson LPN

## 2020-10-14 RX ORDER — PHENOBARBITAL 16.2 MG/1
16.2 TABLET ORAL DAILY
Qty: 90 TABLET | Refills: 0 | Status: SHIPPED | OUTPATIENT
Start: 2020-10-14 | End: 2021-02-24

## 2020-10-23 ENCOUNTER — PATIENT MESSAGE (OUTPATIENT)
Dept: FAMILY MEDICINE CLINIC | Age: 65
End: 2020-10-23

## 2020-10-23 RX ORDER — DIPHENOXYLATE HYDROCHLORIDE AND ATROPINE SULFATE 2.5; .025 MG/1; MG/1
TABLET ORAL
Qty: 180 TABLET | Refills: 0 | Status: SHIPPED | OUTPATIENT
Start: 2020-10-23 | End: 2021-01-25 | Stop reason: SDUPTHER

## 2020-10-23 NOTE — TELEPHONE ENCOUNTER
From: Lucía Miguel  To: Clair Izaguirre MD  Sent: 10/23/2020 8:09 AM CDT  Subject: Prescription Question    Dr. Rafy Juan    I have 3 days of Diphenoxylate/ Atropine Tab 2.5 mg . Take 1 tablet twice a day . I need a new prescription with 90 day supply sent to : [facility]. Please do not send to [address] [place] [address] mail in. I will be without medication for a week + by the time they process and mail it to me. Please notify me when the prescription has been sent to [address] [place] [address]. Thank You.     Genie Harman

## 2020-11-05 ENCOUNTER — OFFICE VISIT (OUTPATIENT)
Dept: FAMILY MEDICINE CLINIC | Age: 65
End: 2020-11-05
Payer: COMMERCIAL

## 2020-11-05 VITALS
SYSTOLIC BLOOD PRESSURE: 122 MMHG | WEIGHT: 172.5 LBS | HEART RATE: 54 BPM | DIASTOLIC BLOOD PRESSURE: 70 MMHG | TEMPERATURE: 97.7 F | OXYGEN SATURATION: 98 % | BODY MASS INDEX: 24.75 KG/M2

## 2020-11-05 PROCEDURE — 99214 OFFICE O/P EST MOD 30 MIN: CPT | Performed by: FAMILY MEDICINE

## 2020-11-05 NOTE — PROGRESS NOTES
[facility] PHYSICIAN SERVICES  [facility] FAMILY MEDICINE  [id number] [place] [address]  [address] [address]  Dept: [phone]  Dept Fax: [phone]  Loc: [phone]     Cristina Ortiz is a 60 y.o. male who presents today for his medical conditions/complaintsas noted below. Cristina Ortiz is c/o of 6 Month Follow-Up        HPI:   Patient is here for follow up. He is doing well with IBS, has refill on lomotil. Hypertension  Compliant with medications. No adverse effects from medication. No lightheadedness, palpitations, or chest pain. COPD  Compliant with medications. No adverse effects from medication. Symptoms are stable today. Has chronic shortness of breath and cough but at baseline today. No wheezing or chest tightness. Gastroesophageal Reflux Disease  Symptoms currently under control. Medication adequately controls his symptoms. No hematochezia or melena. He is careful to avoid food triggers, worse when not eating. No loss of appetite. He has carpal tunnel on left arm, has numbness, tingling. He has right sided inguinal hernia.      Lab Results   Component Value Date     02/11/2020    K 3.6 02/11/2020     02/11/2020    CO2 29 02/11/2020    BUN 12 02/11/2020    CREATININE 1.0 02/11/2020    GLUCOSE 95 02/11/2020    CALCIUM 9.2 02/11/2020    PROT 6.7 02/11/2020    LABALBU 4.0 02/11/2020    BILITOT 0.3 02/11/2020    ALKPHOS 66 02/11/2020    AST 19 02/11/2020    ALT 25 02/11/2020    LABGLOM >60 02/11/2020     Lab Results   Component Value Date    CHOL 138 (L) 02/11/2020    CHOL 144 (L) 02/25/2019    CHOL 133 (L) 11/02/2017     Lab Results   Component Value Date    TRIG 126 02/11/2020    TRIG 163 (H) 02/25/2019    TRIG 147 11/02/2017     Lab Results   Component Value Date    HDL 47 (L) 02/11/2020    HDL 43 (L) 02/25/2019    HDL 42 (L) 11/02/2017     Lab Results   Component Value Date    LDLCALC 66 02/11/2020    LDLCALC 68 02/25/2019    LDLCALC 62 11/02/2017     No results found for: LABVLDL, VLDL  No results found for: [facility]  Lab Results   Component Value Date    PSA 0.69 02/11/2020    PSA 0.56 02/25/2019         HPI    Past Medical History:   Diagnosis Date    Hyperlipidemia     Hypertension     IBS (irritable bowel syndrome)      Past Surgical History:   Procedure Laterality Date    APPENDECTOMY  1974    COLONOSCOPY  2012    Dr. Myranda Riley  10/2011    Exc Inclusion Cyst Rt Buttock, Perianal Area, Fistulotomy, Ext Hemorrhoids    SKIN CANCER EXCISION  8/2011    back of neck - precancerous    UPPER GASTROINTESTINAL ENDOSCOPY  2010    Dr. Aurea Cranker- Esophagous stretched. Family History   Problem Relation Age of Onset    High Blood Pressure Mother     Alzheimer's Disease Mother     High Blood Pressure Father     Cancer Father         lung       Social History     Tobacco Use    Smoking status: Light Tobacco Smoker     Packs/day: 0.50     Years: 30.00     Pack years: 15.00    Smokeless tobacco: Never Used   Substance Use Topics    Alcohol use: Yes     Comment: rarely      Current Outpatient Medications   Medication Sig Dispense Refill    diphenoxylate-atropine (LOMOTIL) 2.5-0.025 MG per tablet TAKE 1 TABLET TWICE A  tablet 0    PHENobarbital (LUMINAL) 16.2 MG tablet Take 1 tablet by mouth daily for 90 days.  90 tablet 0    esomeprazole (NEXIUM) 40 MG delayed release capsule Take 1 capsule by mouth every morning (before breakfast) 30 capsule 5    losartan-hydroCHLOROthiazide (HYZAAR) 100-12.5 MG per tablet TAKE 1 TABLET DAILY 90 tablet 1    clotrimazole-betamethasone (LOTRISONE) 1-0.05 % cream APPLY TO AFFECTED AREA  2 TO 3 TIMES PER DAY 1 Tube 3    buPROPion (WELLBUTRIN SR) 150 MG extended release tablet TAKE 1 TABLET DAILY 90 tablet 3    metoprolol succinate (TOPROL XL) 50 MG extended release tablet TAKE 1 TABLET DAILY 90 tablet 3    rosuvastatin (CRESTOR) 10 MG tablet TAKE 1 TABLET DAILY 90 tablet 3    Ascorbic Acid (VITAMIN C) 500 MG tablet Take 500 mg by mouth daily.  vitamin B-12 (CYANOCOBALAMIN) 100 MCG tablet Take 50 mcg by mouth daily.  aspirin 81 MG EC tablet Take 81 mg by mouth daily.  hyoscyamine (ANASPAZ;LEVSIN) 125 MCG tablet TAKE 1 TABLET BY MOUTH DAILY 90 tablet 3     No current facility-administered medications for this visit. Allergies   Allergen Reactions    Dexamethasone      Severe hiccups placed in hospital for 3 days    Celebrex [Celecoxib] Rash       Health Maintenance   Topic Date Due    Hepatitis C screen  1955    Pneumococcal 0-64 years Vaccine (1 of 1 - PPSV23) 01/31/1962    HIV screen  01/31/1971    Colon cancer screen colonoscopy  11/11/2015    Flu vaccine (1) 11/05/2021 (Originally 9/1/2020)    Lipid screen  02/11/2021    Potassium monitoring  02/11/2021    Creatinine monitoring  02/11/2021    DTaP/Tdap/Td vaccine (2 - Td) 03/30/2025    Shingles Vaccine  Completed    Hepatitis A vaccine  Aged Out    Hepatitis B vaccine  Aged Out    Hib vaccine  Aged Out    Meningococcal (ACWY) vaccine  Aged Out       Subjective:     Review of Systems   Constitutional: Negative for chills and fever. HENT: Negative for congestion. Respiratory: Negative for cough, chest tightness and shortness of breath. Cardiovascular: Negative for chest pain, palpitations and leg swelling. Gastrointestinal: Negative for abdominal pain, anal bleeding, constipation, diarrhea and nausea. Inguinal hernia   Genitourinary: Negative for difficulty urinating. Neurological:        Carpal tunnel   Psychiatric/Behavioral: Negative. See HPI for visit specific review of symptoms. All others negative      Objective:   /70   Pulse 54   Temp 97.7 °F (36.5 °C)   Wt 172 lb 8 oz (78.2 kg)   SpO2 98%   BMI 24.75 kg/m²    Physical Exam  Constitutional:       Appearance: He is well-developed. He is not ill-appearing.    Eyes:      Pupils: Pupils are equal, round, and reactive to light. Neck:      Musculoskeletal: Normal range of motion and neck supple. Cardiovascular:      Rate and Rhythm: Normal rate and regular rhythm. Heart sounds: No murmur. No friction rub. Pulmonary:      Effort: Pulmonary effort is normal. No respiratory distress. Breath sounds: Normal breath sounds. No wheezing or rales. Abdominal:      General: Bowel sounds are normal. There is no distension. Palpations: Abdomen is soft. Tenderness: There is no abdominal tenderness. There is no guarding or rebound. Musculoskeletal:         General: Tenderness (right inguinal hernia, reproducible) present. Neurological:      Mental Status: He is alert. Psychiatric:         Behavior: Behavior normal.           Lab Review   No results found for this or any previous visit (from the past 672 hour(s)). Assessment & Plan: The following diagnoses and conditions are stable withno further orders unless indicated:  Awais Gilliland was seen today for 6 month follow-up. Diagnoses and all orders for this visit:    Essential (primary) hypertension  Blood pressure is stable. Continue current medications. Monitor ambulatory bp readings, if persistently >140/90, return to clinic. Chronic obstructive pulmonary disease, unspecified COPD type ([address]  Stable, continue same. GERD without esophagitis  PPI daily Avoid allergic triggers. Mixed irritable bowel syndrome  Stable, continue current meds. Bilateral carpal tunnel syndrome  Wear wrist braces for support. Familial hypercholesterolemia  Will work on diet and exercise, will follow. Unilateral recurrent inguinal hernia without obstruction or gangrene  No desire for surgery at this point, if not reproducible or pain, needs to go to ED, otherwise , will follow. Return in about 3 months (around 2/5/2021) for Physical.            Discussed use, benefit, and side effects of prescribed medications. All patient questions answered. Pt voiced understanding. Reviewed health maintenance. Instructed to continue current medications, diet and exercise. Patient agreedwith treatment plan. Follow up as directed.

## 2020-11-15 PROBLEM — R19.8 ALTERNATING CONSTIPATION AND DIARRHEA: Status: RESOLVED | Noted: 2020-02-26 | Resolved: 2020-11-15

## 2020-11-15 PROBLEM — K40.91 UNILATERAL RECURRENT INGUINAL HERNIA WITHOUT OBSTRUCTION OR GANGRENE: Status: ACTIVE | Noted: 2020-11-15

## 2020-11-15 PROBLEM — Z87.891 PERSONAL HISTORY OF TOBACCO USE: Status: RESOLVED | Noted: 2019-03-06 | Resolved: 2020-11-15

## 2020-11-15 ASSESSMENT — ENCOUNTER SYMPTOMS
SHORTNESS OF BREATH: 0
COUGH: 0
CONSTIPATION: 0
ANAL BLEEDING: 0
DIARRHEA: 0
CHEST TIGHTNESS: 0
ABDOMINAL PAIN: 0
NAUSEA: 0

## 2020-11-23 ENCOUNTER — PATIENT MESSAGE (OUTPATIENT)
Dept: FAMILY MEDICINE CLINIC | Age: 65
End: 2020-11-23

## 2020-11-23 RX ORDER — HYOSCYAMINE SULFATE 0.125 MG
125 TABLET ORAL DAILY
Qty: 90 TABLET | Refills: 3 | Status: SHIPPED | OUTPATIENT
Start: 2020-11-23 | End: 2021-09-22

## 2020-11-23 NOTE — TELEPHONE ENCOUNTER
From: Leidy Stevenson  To: Karuna Benz MD  Sent: 11/23/2020 4:18 PM CST  Subject: Prescription Question    I will be out of Hyoscyamine Sulfate Tablet, 0.125 MG, in a few days - take one daily, 90 day supply. Please send script to [facility], [facility]. Have a Happy Thanksgiving!

## 2020-11-30 RX ORDER — ROSUVASTATIN CALCIUM 10 MG/1
TABLET, COATED ORAL
Qty: 90 TABLET | Refills: 3 | Status: SHIPPED | OUTPATIENT
Start: 2020-11-30 | End: 2021-11-15

## 2020-12-27 RX ORDER — LOSARTAN POTASSIUM AND HYDROCHLOROTHIAZIDE 12.5; 1 MG/1; MG/1
TABLET ORAL
Qty: 90 TABLET | Refills: 1 | Status: SHIPPED | OUTPATIENT
Start: 2020-12-27 | End: 2021-06-14

## 2020-12-27 RX ORDER — METOPROLOL SUCCINATE 50 MG/1
TABLET, EXTENDED RELEASE ORAL
Qty: 90 TABLET | Refills: 3 | Status: SHIPPED | OUTPATIENT
Start: 2020-12-27 | End: 2022-02-28 | Stop reason: SDUPTHER

## 2020-12-27 NOTE — TELEPHONE ENCOUNTER
Traciemiller Alejandro called to request a refill on his medication.       Last office visit : 11/5/2020   Next office visit : 5/11/2021     Requested Prescriptions     Pending Prescriptions Disp Refills    metoprolol succinate (TOPROL XL) 50 MG extended release tablet [Pharmacy Med Name: Damion Handing ER TAB SUC 50MG] 90 tablet 3     Sig: TAKE 1 TABLET DAILY    losartan-hydroCHLOROthiazide (HYZAAR) 100-12.5 MG per tablet [Pharmacy Med Name: LOSARTAN/HCT -12.5] 90 tablet 1     Sig: TAKE 1 TABLET DAILY            Debbie Mcgowan LPN

## 2021-01-20 RX ORDER — BUPROPION HYDROCHLORIDE 150 MG/1
TABLET, EXTENDED RELEASE ORAL
Qty: 90 TABLET | Refills: 3 | Status: SHIPPED | OUTPATIENT
Start: 2021-01-20 | End: 2022-01-03

## 2021-01-25 DIAGNOSIS — K58.0 IRRITABLE BOWEL SYNDROME WITH DIARRHEA: ICD-10-CM

## 2021-01-25 RX ORDER — DIPHENOXYLATE HYDROCHLORIDE AND ATROPINE SULFATE 2.5; .025 MG/1; MG/1
TABLET ORAL
Qty: 180 TABLET | Refills: 0 | Status: SHIPPED | OUTPATIENT
Start: 2021-01-25 | End: 2021-01-26 | Stop reason: SDUPTHER

## 2021-01-26 DIAGNOSIS — K58.0 IRRITABLE BOWEL SYNDROME WITH DIARRHEA: ICD-10-CM

## 2021-01-26 RX ORDER — DIPHENOXYLATE HYDROCHLORIDE AND ATROPINE SULFATE 2.5; .025 MG/1; MG/1
TABLET ORAL
Qty: 60 TABLET | Refills: 0 | Status: SHIPPED | OUTPATIENT
Start: 2021-01-26 | End: 2021-05-24 | Stop reason: SDUPTHER

## 2021-01-26 NOTE — TELEPHONE ENCOUNTER
Lazaro Covarrubias called to request a refill on his medication. Last office visit : 11/5/2020   Next office visit : 5/11/2021     Requested Prescriptions     Pending Prescriptions Disp Refills    diphenoxylate-atropine (LOMOTIL) 2.5-0.025 MG per tablet 60 tablet 0     Sig: TAKE 1 TABLET TWICE A DAY       90 day rx sent to mail order already needs a local pharmacy now.      Salazar Owusu MA

## 2021-02-23 DIAGNOSIS — K58.9 IRRITABLE BOWEL SYNDROME, UNSPECIFIED TYPE: ICD-10-CM

## 2021-02-24 RX ORDER — PHENOBARBITAL 16.2 MG/1
TABLET ORAL
Qty: 30 TABLET | Refills: 2 | Status: SHIPPED | OUTPATIENT
Start: 2021-02-24 | End: 2021-05-25

## 2021-05-11 ENCOUNTER — OFFICE VISIT (OUTPATIENT)
Dept: FAMILY MEDICINE CLINIC | Age: 66
End: 2021-05-11
Payer: COMMERCIAL

## 2021-05-11 VITALS
DIASTOLIC BLOOD PRESSURE: 72 MMHG | SYSTOLIC BLOOD PRESSURE: 112 MMHG | HEIGHT: 70 IN | OXYGEN SATURATION: 98 % | TEMPERATURE: 96.7 F | BODY MASS INDEX: 25.27 KG/M2 | HEART RATE: 52 BPM | WEIGHT: 176.5 LBS

## 2021-05-11 DIAGNOSIS — K21.9 GERD WITHOUT ESOPHAGITIS: ICD-10-CM

## 2021-05-11 DIAGNOSIS — F17.210 CIGARETTE NICOTINE DEPENDENCE WITHOUT COMPLICATION: ICD-10-CM

## 2021-05-11 DIAGNOSIS — E78.01 FAMILIAL HYPERCHOLESTEROLEMIA: ICD-10-CM

## 2021-05-11 DIAGNOSIS — I10 ESSENTIAL (PRIMARY) HYPERTENSION: ICD-10-CM

## 2021-05-11 DIAGNOSIS — K22.2 ESOPHAGEAL STRICTURE: ICD-10-CM

## 2021-05-11 DIAGNOSIS — Z00.00 ANNUAL PHYSICAL EXAM: Primary | ICD-10-CM

## 2021-05-11 DIAGNOSIS — Z12.5 ENCOUNTER FOR PROSTATE CANCER SCREENING: ICD-10-CM

## 2021-05-11 PROCEDURE — 99397 PER PM REEVAL EST PAT 65+ YR: CPT | Performed by: FAMILY MEDICINE

## 2021-05-11 ASSESSMENT — PATIENT HEALTH QUESTIONNAIRE - PHQ9
2. FEELING DOWN, DEPRESSED OR HOPELESS: 0
SUM OF ALL RESPONSES TO PHQ QUESTIONS 1-9: 0
1. LITTLE INTEREST OR PLEASURE IN DOING THINGS: 0

## 2021-05-11 NOTE — PROGRESS NOTES
[facility] PHYSICIAN SERVICES  [facility] FAM[facility] MEDICINE  [address] [place] [address]  [address] [address]  Dept: [phone]  Dept Fax: [phone]  Loc: [phone]     Cullen Lr is a 73 y.o. male who presents today for his medical conditions/complaintsas noted below. Cullen Lr is c/o of Annual Exam        HPI:   Patient is here for physical.     PSA due. Tdap UTD. Covid vaccine complete. He will consider cologuard. He is complaining of reflux. No dysphagia. He can feel IVELISSE pain relieved when standing. He states no food triggers. Hypertension  Compliant with medications. No adverse effects from medication. No lightheadedness, palpitations, or chest pain.     Lab Results   Component Value Date    CHOL 122 (L) 05/19/2021    CHOL 138 (L) 02/11/2020    CHOL 144 (L) 02/25/2019     Lab Results   Component Value Date    TRIG 150 (H) 05/19/2021    TRIG 126 02/11/2020    TRIG 163 (H) 02/25/2019     Lab Results   Component Value Date    HDL 38 (L) 05/19/2021    HDL 47 (L) 02/11/2020    HDL 43 (L) 02/25/2019     Lab Results   Component Value Date    LDLCALC 54 05/19/2021    LDLCALC 66 02/11/2020    LDLCALC 68 02/25/2019     No results found for: LABVLDL, VLDL  No results found for: [facility]  Lab Results   Component Value Date     05/19/2021    K 3.3 (L) 05/19/2021     05/19/2021    CO2 29 05/19/2021    BUN 12 05/19/2021    CREATININE 0.9 05/19/2021    GLUCOSE 82 05/19/2021    CALCIUM 9.1 05/19/2021    PROT 6.5 (L) 05/19/2021    LABALBU 3.8 05/19/2021    BILITOT 0.4 05/19/2021    ALKPHOS 81 05/19/2021    AST 14 05/19/2021    ALT 10 05/19/2021    LABGLOM >60 05/19/2021    GFRAA >59 05/19/2021           HPI    Past Medical History:   Diagnosis Date    Hyperlipidemia     Hypertension     IBS (irritable bowel syndrome)      Past Surgical History:   Procedure Laterality Date   [facility] COLONOSCOPY  2012    Dr. Setphanie Ford SURGERY  10/2011    Exc Inclusion Cyst Rt Buttock, Perianal Area, Fistulotomy, Ext Hemorrhoids    SKIN CANCER EXCISION  8/2011    back of neck - precancerous    UPPER GASTROINTESTINAL ENDOSCOPY  2010    Dr. Keith Ying- Esophagous stretched. Family History   Problem Relation Age of Onset    High Blood Pressure Mother     Alzheimer's Disease Mother     High Blood Pressure Father     Cancer Father         lung       Social History     Tobacco Use    Smoking status: Light Tobacco Smoker     Packs/day: 0.50     Years: 30.00     Pack years: 15.00    Smokeless tobacco: Never Used   Substance Use Topics    Alcohol use: Yes     Comment: rarely      Current Outpatient Medications   Medication Sig Dispense Refill    diphenoxylate-atropine (LOMOTIL) 2.5-0.025 MG per tablet TAKE 1 TABLET TWICE A DAY 60 tablet 0    buPROPion (WELLBUTRIN SR) 150 MG extended release tablet TAKE 1 TABLET DAILY 90 tablet 3    metoprolol succinate (TOPROL XL) 50 MG extended release tablet TAKE 1 TABLET DAILY 90 tablet 3    losartan-hydroCHLOROthiazide (HYZAAR) 100-12.5 MG per tablet TAKE 1 TABLET DAILY 90 tablet 1    rosuvastatin (CRESTOR) 10 MG tablet TAKE 1 TABLET DAILY 90 tablet 3    hyoscyamine (ANASPAZ;LEVSIN) 125 MCG tablet Take 1 tablet by mouth daily 90 tablet 3    clotrimazole-betamethasone (LOTRISONE) 1-0.05 % cream APPLY TO AFFECTED AREA  2 TO 3 TIMES PER DAY 1 Tube 3    Ascorbic Acid (VITAMIN C) 500 MG tablet Take 500 mg by mouth daily.  vitamin B-12 (CYANOCOBALAMIN) 100 MCG tablet Take 50 mcg by mouth daily.  aspirin 81 MG EC tablet Take 81 mg by mouth daily.  esomeprazole (NEXIUM) 40 MG delayed release capsule Take 1 capsule by mouth every morning (before breakfast) (Patient not taking: Reported on 5/11/2021) 30 capsule 5     No current facility-administered medications for this visit.      Allergies   Allergen Reactions    Dexamethasone      Severe hiccups placed in hospital for 3 days    Celebrex [Celecoxib] Rash       Health Maintenance   Topic Date Due    Hepatitis C screen  Never done    HIV screen  Never done    Colon cancer screen colonoscopy  11/11/2015    Pneumococcal 65+ years Vaccine (1 of 1 - PPSV23) Never done    Flu vaccine (Season Ended) 11/05/2021 (Originally 9/1/2021)    Lipid screen  05/19/2022    Potassium monitoring  05/19/2022    Creatinine monitoring  05/19/2022    DTaP/Tdap/Td vaccine (2 - Td) 03/30/2025    Shingles Vaccine  Completed    COVID-19 Vaccine  Completed    AAA screen  Completed    Hepatitis A vaccine  Aged Out    Hepatitis B vaccine  Aged Out    Hib vaccine  Aged Out    Meningococcal (ACWY) vaccine  Aged Out       Subjective:     Review of Systems   Constitutional: Negative for chills and fever. HENT: Negative for congestion. Respiratory: Negative for cough, chest tightness and shortness of breath. Cardiovascular: Negative for chest pain, palpitations and leg swelling. Gastrointestinal: Negative for abdominal pain, anal bleeding, constipation, diarrhea and nausea. Genitourinary: Negative for difficulty urinating. Psychiatric/Behavioral: Negative. See HPI for visit specific review of symptoms. All others negative      Objective:   /72   Pulse 52   Temp 96.7 °F (35.9 °C)   Ht 5' 10\" (1.778 m)   Wt 176 lb 8 oz (80.1 kg)   SpO2 98%   BMI 25.33 kg/m²    Physical Exam  Constitutional:       Appearance: He is well-developed. He is not ill-appearing. Eyes:      Pupils: Pupils are equal, round, and reactive to light. Cardiovascular:      Rate and Rhythm: Normal rate and regular rhythm. Heart sounds: No murmur heard. No friction rub. Pulmonary:      Effort: Pulmonary effort is normal. No respiratory distress. Breath sounds: Normal breath sounds. No wheezing or rales. Abdominal:      General: Bowel sounds are normal. There is no distension. Palpations: Abdomen is soft. Tenderness:  There is no abdominal tenderness. There is no guarding or rebound. Musculoskeletal:      Cervical back: Normal range of motion and neck supple. Neurological:      Mental Status: He is alert. Psychiatric:         Behavior: Behavior normal.           Lab Review   Recent Results (from the past 672 hour(s))   PSA screening    Collection Time: 05/19/21  8:31 AM   Result Value Ref Range    PSA 0.54 0.00 - 4.00 ng/mL   Lipid Panel    Collection Time: 05/19/21  8:31 AM   Result Value Ref Range    Cholesterol, Total 122 (L) 160 - 199 mg/dL    Triglycerides 150 (H) 0 - 149 mg/dL    HDL 38 (L) 55 - 121 mg/dL    LDL Calculated 54 <100 mg/dL   Comprehensive Metabolic Panel    Collection Time: 05/19/21  8:31 AM   Result Value Ref Range    Sodium 142 136 - 145 mmol/L    Potassium 3.3 (L) 3.5 - 5.0 mmol/L    Chloride 104 98 - 111 mmol/L    CO2 29 22 - 29 mmol/L    Anion Gap 9 7 - 19 mmol/L    Glucose 82 74 - 109 mg/dL    BUN 12 8 - 23 mg/dL    CREATININE 0.9 0.5 - 1.2 mg/dL    GFR Non-African American >60 >60    GFR African American >59 >59    Calcium 9.1 8.8 - 10.2 mg/dL    Total Protein 6.5 (L) 6.6 - 8.7 g/dL    Albumin 3.8 3.5 - 5.2 g/dL    Total Bilirubin 0.4 0.2 - 1.2 mg/dL    Alkaline Phosphatase 81 40 - 130 U/L    ALT 10 5 - 41 U/L    AST 14 5 - 40 U/L               Assessment & Plan: The following diagnoses and conditions are stable withno further orders unless indicated:  Ofelia Quijano was seen today for annual exam.    Diagnoses and all orders for this visit:    Annual physical exam  Labs reviewed and discussed. Encouraged healthy diet and regular exercise. Immunizations UTD. See HPI for further details. Considering cologuard. CHeck PSA. Encounter for prostate cancer screening  -     PSA screening; Future    Essential (primary) hypertension  -     Comprehensive Metabolic Panel; Future  -     Lipid Panel; Future  Blood pressure is stable. Continue current medications.  Monitor ambulatory bp readings, if persistently >140/90, return to clinic. Esophageal stricture  -     Dell Blanco MD, Gastroenterology, [place]  Refer to GI, hx of stricture with symptoms. Cigarette nicotine dependence without complication  -     CT lung screen [Initial/Annual]; Future  Refer for screening lung CT. GERD without esophagitis  Avoid food triggers. PPI daily. Familial hypercholesterolemia  Stable, limit carbs, will follow. Return in about 3 months (around 8/11/2021). Discussed use, benefit, and side effects of prescribed medications. All patient questions answered. Pt voiced understanding. Reviewed health maintenance. Instructed to continue current medications, diet and exercise. Patient agreedwith treatment plan. Follow up as directed.

## 2021-05-13 ENCOUNTER — TELEPHONE (OUTPATIENT)
Dept: FAMILY MEDICINE CLINIC | Age: 66
End: 2021-05-13

## 2021-05-13 NOTE — TELEPHONE ENCOUNTER
----- Message from Jorge Eddy MD sent at 5/11/2021  3:27 PM CDT -----  Please set up Sigrid Dejesus with talha

## 2021-05-19 DIAGNOSIS — I10 ESSENTIAL (PRIMARY) HYPERTENSION: ICD-10-CM

## 2021-05-19 DIAGNOSIS — Z12.5 ENCOUNTER FOR PROSTATE CANCER SCREENING: ICD-10-CM

## 2021-05-19 LAB
ALBUMIN SERPL-MCNC: 3.8 G/DL (ref 3.5–5.2)
ALP BLD-CCNC: 81 U/L (ref 40–130)
ALT SERPL-CCNC: 10 U/L (ref 5–41)
ANION GAP SERPL CALCULATED.3IONS-SCNC: 9 MMOL/L (ref 7–19)
AST SERPL-CCNC: 14 U/L (ref 5–40)
BILIRUB SERPL-MCNC: 0.4 MG/DL (ref 0.2–1.2)
BUN BLDV-MCNC: 12 MG/DL (ref 8–23)
CALCIUM SERPL-MCNC: 9.1 MG/DL (ref 8.8–10.2)
CHLORIDE BLD-SCNC: 104 MMOL/L (ref 98–111)
CHOLESTEROL, TOTAL: 122 MG/DL (ref 160–199)
CO2: 29 MMOL/L (ref 22–29)
CREAT SERPL-MCNC: 0.9 MG/DL (ref 0.5–1.2)
GFR AFRICAN AMERICAN: >59
GFR NON-AFRICAN AMERICAN: >60
GLUCOSE BLD-MCNC: 82 MG/DL (ref 74–109)
HDLC SERPL-MCNC: 38 MG/DL (ref 55–121)
LDL CHOLESTEROL CALCULATED: 54 MG/DL
POTASSIUM SERPL-SCNC: 3.3 MMOL/L (ref 3.5–5)
PROSTATE SPECIFIC ANTIGEN: 0.54 NG/ML (ref 0–4)
SODIUM BLD-SCNC: 142 MMOL/L (ref 136–145)
TOTAL PROTEIN: 6.5 G/DL (ref 6.6–8.7)
TRIGL SERPL-MCNC: 150 MG/DL (ref 0–149)

## 2021-05-21 DIAGNOSIS — K58.9 IRRITABLE BOWEL SYNDROME, UNSPECIFIED TYPE: ICD-10-CM

## 2021-05-21 NOTE — TELEPHONE ENCOUNTER
Kaya Troncoso called to request a refill on his medication. Last office visit : 5/11/2021   Next office visit : 8/11/2021     Last UDS:   Amphetamine Screen, Urine   Date Value Ref Range Status   01/09/2020 neg  Final     Barbiturate Screen, Urine   Date Value Ref Range Status   01/09/2020 pos  Final     Benzodiazepine Screen, Urine   Date Value Ref Range Status   01/09/2020 neg  Final     Buprenorphine Urine   Date Value Ref Range Status   01/09/2020 neg  Final     Cocaine Metabolite Screen, Urine   Date Value Ref Range Status   01/09/2020 neg  Final     Gabapentin Screen, Urine   Date Value Ref Range Status   01/09/2020 neg  Final     MDMA, Urine   Date Value Ref Range Status   01/09/2020 neg  Final     Methamphetamine, Urine   Date Value Ref Range Status   01/09/2020 neg  Final     Opiate Scrn, Ur   Date Value Ref Range Status   01/09/2020 neg  Final     Oxycodone Screen, Ur   Date Value Ref Range Status   01/09/2020 neg  Final     PCP Screen, Urine   Date Value Ref Range Status   01/09/2020 neg  Final     Propoxyphene Screen, Urine   Date Value Ref Range Status   01/09/2020 neg  Final     THC Screen, Urine   Date Value Ref Range Status   01/09/2020 neg  Final     Tricyclic Antidepressants, Urine   Date Value Ref Range Status   01/09/2020 neg  Final       Last Isabel Fruit: 2-23-21  Medication Contract: Due   Last Fill: 2-24-21    Requested Prescriptions     Pending Prescriptions Disp Refills    PHENobarbital (LUMINAL) 16.2 MG tablet [Pharmacy Med Name: PHENOBARBITAL 16.2 MG TABLET] 30 tablet 2     Sig: TAKE 1 TABLET BY MOUTH EVERY DAY         Please approve or refuse this medication.    Camelia Avina MA

## 2021-05-24 DIAGNOSIS — K58.0 IRRITABLE BOWEL SYNDROME WITH DIARRHEA: ICD-10-CM

## 2021-05-24 RX ORDER — DIPHENOXYLATE HYDROCHLORIDE AND ATROPINE SULFATE 2.5; .025 MG/1; MG/1
TABLET ORAL
Qty: 60 TABLET | Refills: 0 | Status: SHIPPED | OUTPATIENT
Start: 2021-05-24 | End: 2021-05-25 | Stop reason: SDUPTHER

## 2021-05-24 ASSESSMENT — ENCOUNTER SYMPTOMS
COUGH: 0
NAUSEA: 0
CHEST TIGHTNESS: 0
ANAL BLEEDING: 0
DIARRHEA: 0
ABDOMINAL PAIN: 0
SHORTNESS OF BREATH: 0
CONSTIPATION: 0

## 2021-05-25 ENCOUNTER — HOSPITAL ENCOUNTER (OUTPATIENT)
Dept: CT IMAGING | Age: 66
Discharge: HOME | End: 2021-05-25
Payer: COMMERCIAL

## 2021-05-25 DIAGNOSIS — F17.210 CIGARETTE NICOTINE DEPENDENCE WITHOUT COMPLICATION: ICD-10-CM

## 2021-05-25 PROCEDURE — 71271 CT THORAX LUNG CANCER SCR C-: CPT

## 2021-05-25 RX ORDER — DIPHENOXYLATE HYDROCHLORIDE AND ATROPINE SULFATE 2.5; .025 MG/1; MG/1
TABLET ORAL
Qty: 60 TABLET | Refills: 0 | Status: SHIPPED | OUTPATIENT
Start: 2021-05-25 | End: 2021-06-24

## 2021-05-25 RX ORDER — PHENOBARBITAL 16.2 MG/1
TABLET ORAL
Qty: 30 TABLET | Refills: 2 | Status: SHIPPED | OUTPATIENT
Start: 2021-05-25 | End: 2021-12-07 | Stop reason: SDUPTHER

## 2021-05-26 ENCOUNTER — OFFICE VISIT (OUTPATIENT)
Dept: GASTROENTEROLOGY | Age: 66
End: 2021-05-26
Payer: COMMERCIAL

## 2021-05-26 VITALS
OXYGEN SATURATION: 98 % | SYSTOLIC BLOOD PRESSURE: 102 MMHG | HEART RATE: 57 BPM | BODY MASS INDEX: 24.74 KG/M2 | WEIGHT: 172.8 LBS | DIASTOLIC BLOOD PRESSURE: 56 MMHG | HEIGHT: 70 IN

## 2021-05-26 DIAGNOSIS — R12 CHRONIC HEARTBURN: Primary | ICD-10-CM

## 2021-05-26 DIAGNOSIS — R13.10 DYSPHAGIA, UNSPECIFIED TYPE: ICD-10-CM

## 2021-05-26 PROCEDURE — 99214 OFFICE O/P EST MOD 30 MIN: CPT | Performed by: NURSE PRACTITIONER

## 2021-05-26 ASSESSMENT — ENCOUNTER SYMPTOMS
SHORTNESS OF BREATH: 0
DIARRHEA: 0
ABDOMINAL DISTENTION: 0
ANAL BLEEDING: 0
ABDOMINAL PAIN: 0
BLOOD IN STOOL: 0
COUGH: 0
VOMITING: 0
TROUBLE SWALLOWING: 1
SORE THROAT: 0
RECTAL PAIN: 0
NAUSEA: 0
VOICE CHANGE: 0
BACK PAIN: 0
CONSTIPATION: 0

## 2021-05-26 NOTE — PATIENT INSTRUCTIONS
You are going to have an Endoscopy and here are some basic instructions:    Nothing to eat or drink after midnight EXCEPT:  PLEASE TAKE MEDICATION(S) FOR HIGH BLOOD PRESSURE, SEIZURES, HEART, AND THYROID WITH A SIP OF WATER AT LEAST 2 HOURS PRIOR TO ARRIVAL TIME.   YOU MAY ALSO TAKE ANY INHALERS YOU ARE PRESCRIBED. You will not be able to drive for 24 hours after the procedure due to sedation. Bring a  with you the day of the procedure. No aspirin, ibuprofen, naproxen, fish oil or vitamin E for 5 days before procedure. Continue current medications. If you are on blood thinners, clearance from the prescribing physician will be obtained before your procedure is scheduled. Increased [email or web address] may be associated with discontinuation of your blood thinner and include, but not limited to, stroke, TIA, or cardiac event. If biopsies are taken during the procedure they will be sent to a pathologist for analysis. You will be notified by mail of the pathology results in 2-3 weeks. Your physician may also schedule a follow up appointment with the nurse practitioner to discuss pathology, symptoms or to check if you have had any problems related to your procedure. If you prefer not to return to the office after your procedure please discuss this with your physician on the day of your procedure.

## 2021-05-26 NOTE — PROGRESS NOTES
Subjective:      Wanda Mancia is a69 y.o. male  Chief Complaint   Patient presents with    Dysphagia       HPI  PCP: Chiquita Winn MD  C/o dysphagia. Noted with foods only. Chronic for 40 years, waxes and wanes. Reports a hx of esophageal stricture with dil in the past, and this helped. The dysphagia started back about 18 months ago. Reports heartburn. Chronic for 40+ years. Currently on OTC nexium 1-2x daily. And this works well to control it. Has tried RX Nexium and this helped better, but he cant afford it. The Nexium copay cards dont work with his insurance. He has tried and failed protonix and prilosec in the past.     Pt reports he has alternating constipation with diarrhea. Chronic for many years. Was dx with IBS. Reports he uses lomotil and belladona and hyoscamine daily as prescribed by his PCP and this regimen works best for him. Last colonoscopy was in 2012 and was normal per pt. We discussed a colonoscopy, he defers, and is aware of the risks of potential undetected colonic malignancy.     GI scope reports in history per MA per OV policy, I reviewed this. Family HX:    Pt denies family hx of colon polyps, colon CA, inflammatory bowel dx, gastric CA and esophageal CA. Past Medical History:   Diagnosis Date    Hyperlipidemia     Hypertension     IBS (irritable bowel syndrome)           Past Surgical History:   Procedure Laterality Date    APPENDECTOMY  1974    COLONOSCOPY  2012    Dr. Leila Benavidez  10/2011    Exc Inclusion Cyst Rt Buttock, Perianal Area, Fistulotomy, Ext Hemorrhoids    SKIN CANCER EXCISION  8/2011    back of neck - precancerous    UPPER GASTROINTESTINAL ENDOSCOPY  2010    Dr. Tereso Camilo- Esophagous stretched.        Social History     Socioeconomic History    Marital status:      Spouse name: None    Number of children: None    Years of education: None    Highest education level: None Occupational History    None   Tobacco Use    Smoking status: Light Tobacco Smoker     Packs/day: 0.50     Years: 30.00     Pack years: 15.00    Smokeless tobacco: Never Used   Vaping Use    Vaping Use: Never used   Substance and Sexual Activity    Alcohol use: Yes     Comment: rarely    Drug use: No    Sexual activity: None   Other Topics Concern    None   Social History Narrative    None     Social Determinants of Health     Financial Resource Strain:     Difficulty of Paying Living Expenses:    Food Insecurity:     Worried About Running Out of Food in the Last Year:     Ran Out of Food in the Last Year:    Transportation Needs:     Lack of Transportation (Medical):  Lack of Transportation (Non-Medical):    Physical Activity:     Days of Exercise per Week:     Minutes of Exercise per Session:    Stress:     Feeling of Stress :    Social Connections:     Frequency of Communication with Friends and Family:     Frequency of Social Gatherings with Friends and Family:     Attends Latter day Services:     Active Member of Clubs or Organizations:     Attends Club or Organization Meetings:     Marital Status:    Intimate Partner Violence:     Fear of Current or Ex-Partner:     Emotionally Abused:     Physically Abused:     Sexually Abused:         Allergies   Allergen Reactions    Dexamethasone      Severe hiccups placed in hospital for 3 days    Celebrex [Celecoxib] Rash       Current Outpatient Medications   Medication Sig Dispense Refill    PHENobarbital (LUMINAL) 16.2 MG tablet TAKE 1 TABLET BY MOUTH EVERY DAY 30 tablet 2    diphenoxylate-atropine (LOMOTIL) 2.5-0.025 MG per tablet TAKE 1 TABLET TWICE A DAY 60 tablet 0    buPROPion (WELLBUTRIN SR) 150 MG extended release tablet TAKE 1 TABLET DAILY 90 tablet 3    metoprolol succinate (TOPROL XL) 50 MG extended release tablet TAKE 1 TABLET DAILY 90 tablet 3    losartan-hydroCHLOROthiazide (HYZAAR) 100-12.5 MG per tablet TAKE 1 TABLET Conjunctiva/sclera: Conjunctivae normal.      Pupils: Pupils are equal, round, and reactive to light. Neck:      Thyroid: No thyromegaly. Cardiovascular:      Rate and Rhythm: Normal rate and regular rhythm. Heart sounds: Normal heart sounds. No murmur heard. No friction rub. No gallop. Pulmonary:      Effort: Pulmonary effort is normal. No respiratory distress. Breath sounds: Normal breath sounds. Abdominal:      General: Bowel sounds are normal. There is no distension. Palpations: Abdomen is soft. Tenderness: There is no abdominal tenderness. There is no rebound. Musculoskeletal:         General: No deformity. Normal range of motion. Cervical back: Normal range of motion and neck supple. Skin:     Coloration: Skin is not pale. Neurological:      Mental Status: He is alert and oriented to person, place, and time. Cranial Nerves: No cranial nerve deficit. Psychiatric:         Judgment: Judgment normal.           Assessment:       Diagnosis Orders   1. Chronic heartburn  ESOPHAGOSCOPY / EGD   2. Dysphagia, unspecified type  ESOPHAGOSCOPY / EGD         Plan:      1. Gave him samples and coupons for OTC nexium  2. Schedule outpatient endoscopy. Patient advised no Aspirin, Fish Oil, Vit E or NSAIDs 5 (five) days before procedure. Follow-up Visit: per Dr. Corey Dutton  Pt Education:   Risks, benefits, and alternatives to endoscopy were discussed. Patient voices understanding of risks of, but not limited to, perforation, bleeding, and infection. The risk of perforation is increased with esophageal dilatation. All questions answered to patient's satisfaction. Patient is agreable to proceed.

## 2021-05-27 DIAGNOSIS — I25.10 PLAQUE IN HEART ARTERY: Primary | ICD-10-CM

## 2021-06-02 ENCOUNTER — ANESTHESIA EVENT (OUTPATIENT)
Dept: OPERATING ROOM | Age: 66
End: 2021-06-02

## 2021-06-03 ENCOUNTER — HOSPITAL ENCOUNTER (OUTPATIENT)
Age: 66
Setting detail: OUTPATIENT SURGERY
Discharge: HOME | End: 2021-06-03
Attending: INTERNAL MEDICINE | Admitting: INTERNAL MEDICINE
Payer: COMMERCIAL

## 2021-06-03 ENCOUNTER — APPOINTMENT (OUTPATIENT)
Dept: OPERATING ROOM | Age: 66
End: 2021-06-03

## 2021-06-03 ENCOUNTER — HOSPITAL ENCOUNTER (OUTPATIENT)
Age: 66
Setting detail: SPECIMEN
Discharge: HOME | End: 2021-06-03
Payer: COMMERCIAL

## 2021-06-03 ENCOUNTER — ANESTHESIA (OUTPATIENT)
Dept: OPERATING ROOM | Age: 66
End: 2021-06-03

## 2021-06-03 VITALS — OXYGEN SATURATION: 94 % | DIASTOLIC BLOOD PRESSURE: 80 MMHG | SYSTOLIC BLOOD PRESSURE: 129 MMHG

## 2021-06-03 VITALS
DIASTOLIC BLOOD PRESSURE: 68 MMHG | OXYGEN SATURATION: 99 % | BODY MASS INDEX: 24.62 KG/M2 | TEMPERATURE: 97 F | HEIGHT: 70 IN | RESPIRATION RATE: 18 BRPM | SYSTOLIC BLOOD PRESSURE: 114 MMHG | WEIGHT: 172 LBS | HEART RATE: 49 BPM

## 2021-06-03 PROCEDURE — 43239 EGD BIOPSY SINGLE/MULTIPLE: CPT | Performed by: INTERNAL MEDICINE

## 2021-06-03 PROCEDURE — G8918 PT W/O PREOP ORDER IV AB PRO: HCPCS

## 2021-06-03 PROCEDURE — 43248 EGD GUIDE WIRE INSERTION: CPT | Performed by: INTERNAL MEDICINE

## 2021-06-03 PROCEDURE — 88305 TISSUE EXAM BY PATHOLOGIST: CPT

## 2021-06-03 PROCEDURE — 43239 EGD BIOPSY SINGLE/MULTIPLE: CPT

## 2021-06-03 PROCEDURE — 43248 EGD GUIDE WIRE INSERTION: CPT

## 2021-06-03 PROCEDURE — 88342 IMHCHEM/IMCYTCHM 1ST ANTB: CPT

## 2021-06-03 PROCEDURE — G8907 PT DOC NO EVENTS ON DISCHARG: HCPCS

## 2021-06-03 RX ORDER — PROPOFOL 10 MG/ML
INJECTION, EMULSION INTRAVENOUS PRN
Status: DISCONTINUED | OUTPATIENT
Start: 2021-06-03 | End: 2021-06-03 | Stop reason: SDUPTHER

## 2021-06-03 RX ORDER — SODIUM CHLORIDE 9 MG/ML
INJECTION, SOLUTION INTRAVENOUS CONTINUOUS PRN
Status: DISCONTINUED | OUTPATIENT
Start: 2021-06-03 | End: 2021-06-03 | Stop reason: SDUPTHER

## 2021-06-03 RX ORDER — SODIUM CHLORIDE 9 MG/ML
INJECTION, SOLUTION INTRAVENOUS CONTINUOUS
Status: DISCONTINUED | OUTPATIENT
Start: 2021-06-03 | End: 2021-06-03 | Stop reason: HOSPADM

## 2021-06-03 RX ORDER — LIDOCAINE HYDROCHLORIDE 10 MG/ML
INJECTION, SOLUTION INFILTRATION; PERINEURAL PRN
Status: DISCONTINUED | OUTPATIENT
Start: 2021-06-03 | End: 2021-06-03 | Stop reason: SDUPTHER

## 2021-06-03 RX ADMIN — SODIUM CHLORIDE: 9 INJECTION, SOLUTION INTRAVENOUS at 11:26

## 2021-06-03 RX ADMIN — PROPOFOL 200 MG: 10 INJECTION, EMULSION INTRAVENOUS at 11:32

## 2021-06-03 RX ADMIN — LIDOCAINE HYDROCHLORIDE 40 MG: 10 INJECTION, SOLUTION INFILTRATION; PERINEURAL at 11:32

## 2021-06-03 RX ADMIN — SODIUM CHLORIDE: 9 INJECTION, SOLUTION INTRAVENOUS at 10:31

## 2021-06-03 ASSESSMENT — LIFESTYLE VARIABLES: SMOKING_STATUS: 1

## 2021-06-03 NOTE — ANESTHESIA POSTPROCEDURE EVALUATION
Department of Anesthesiology  Postprocedure Note    Patient: Dorie Song  MRN: [id number]  YOB: 1955  Date of evaluation: 6/3/2021  Time:  11:40 AM     Procedure Summary     Date: 06/03/21 Room / Location: [facility] ENDO 02 / [address] [place] [address]    Anesthesia Start: 8521 Anesthesia Stop:     Procedures:       EGD BIOPSY (N/A Esophagus)      EGD DILATION SAVORY 54 FR (N/A Esophagus) Diagnosis: (DYSPHAGIA)    Surgeons: Bora Fonseca MD Responsible Provider: OMAR Leonard CRNA    Anesthesia Type: general, TIVA ASA Status: 3          Anesthesia Type: No value filed. Melquiades Phase I:      Melquiades Phase II:      Last vitals: Reviewed and per EMR flowsheets.        Anesthesia Post Evaluation    Patient location during evaluation: bedside  Patient participation: complete - patient participated  Level of consciousness: sleepy but conscious  Pain score: 0  Airway patency: patent  Nausea & Vomiting: no nausea and no vomiting  Complications: no  Cardiovascular status: hemodynamically stable and blood pressure returned to baseline  Respiratory status: acceptable and nasal cannula  Hydration status: stable

## 2021-06-03 NOTE — OP NOTE
Endoscopic Procedure Note    Patient: Kaya Troncoso : 1955  Med Rec#: [id number] Acc#: [id number]     Primary Care Provider Wendi Barba MD  Referring Provider: Nilesh NELSON    Endoscopist: Angie Meyer MD    Date of Procedure:  6/3/2021    Procedure:   1. EGD with biopsy  2. EGD with wire guided dilation to 54F    Indications:   1. Dysphagia   2. Dyspepsia     Anesthesia:  Sedation was administered by anesthesia who monitored the patient during the procedure. Estimated Blood Loss: minimal    Procedure:   After reviewing the patient's chart and obtaining informed consent, the patient was placed in the left lateral decubitus position. A forward-viewing Olympus endoscope was lubricated and inserted through the mouth into the oropharynx. Under direct visualization, the upper esophagus was intubated. The scope was advanced to the level of the third portion of duodenum. Scope was slowly withdrawn with careful inspection of the mucosal surfaces. The scope was retroflexed for inspection of the gastric fundus and incisura. Findings and maneuvers are listed in impression below. The patient tolerated the procedure well. The scope was removed. There were no immediate complications. Findings:   Esophagus: abnormal: a benign appearing stricture noted in the distal esophagus. Dilated due to symptoms. A guidewire was placed through the endoscope and the endoscope was removed. A 54 Khmer savory dilator was advanced down the length of the esophagus used a fixed-point wire technique. The dilator and guidewire were removed. The patient tolerated the procedure well. There is no hiatal hernia present. Stomach:  abnormal: mild mucosal changes suggestive of gastritis noted -  Gastric biopsies were taken from the antrum and body to rule out Helicobacter pylori infection. Duodenum: normal      IMPRESSION:  1. Esophageal dysphagia - dilated as above  2. Dyspepsia - biopsied      RECOMMENDATIONS:    1. Await path results, the patient will be contacted in 7-10 days with biopsy results. 2.  Repeat EGD with dilation as needed for return of symptoms. 3.  PPI daily. The results were discussed with the patient and family. A copy of the images obtained were given to the patient.      lIya Osuna MD  6/3/2021  11:42 AM

## 2021-06-03 NOTE — ANESTHESIA PRE PROCEDURE
Department of Anesthesiology  Preprocedure Note       Name:  Mariana Delgado   Age:  73 y.o.  :  1955                                          MRN:  [id number]         Date:  6/3/2021      Surgeon: Darlin Parson):  Kamryn Palmer MD    Procedure: Procedure(s):  EGD BIOPSY    Medications prior to admission:   Prior to Admission medications    Medication Sig Start Date End Date Taking? Authorizing Provider   PHENobarbital (LUMINAL) 16.2 MG tablet TAKE 1 TABLET BY MOUTH EVERY DAY 21  Aviva Funk MD   diphenoxylate-atropine (LOMOTIL) 2.5-0.025 MG per tablet TAKE 1 TABLET TWICE A DAY 21  Aviva Funk MD   buPROPion [facility]) 150 MG extended release tablet TAKE 1 TABLET DAILY 21   Aviva Funk MD   metoprolol succinate (TOPROL XL) 50 MG extended release tablet TAKE 1 TABLET DAILY 20   Aviva Funk MD   losartan-hydroCHLOROthiazide [facility]) 100-12.5 MG per tablet TAKE 1 TABLET DAILY 20   Aviva Funk MD   rosuvastatin (CRESTOR) 10 MG tablet TAKE 1 TABLET DAILY 20   Evan Del Valle MD   hyoscyamine (ANASPAZ;LEVSIN) 125 MCG tablet Take 1 tablet by mouth daily 20  Aviva Funk MD   esomeprazole (NEXIUM) 40 MG delayed release capsule Take 1 capsule by mouth every morning (before breakfast)  Patient taking differently: Take 20 mg by mouth every morning (before breakfast) OTC 7/15/20   OMAR Lennon   clotrimazole-betamethasone (LOTRISONE) 1-0.05 % cream APPLY TO AFFECTED AREA  2 TO 3 TIMES PER DAY 20   Aviva Funk MD   Ascorbic Acid (VITAMIN C) 500 MG tablet Take 500 mg by mouth daily. Historical Provider, MD   vitamin B-12 (CYANOCOBALAMIN) 100 MCG tablet Take 50 mcg by mouth daily. Historical Provider, MD   aspirin 81 MG EC tablet Take 81 mg by mouth daily. Historical Provider, MD       Current medications:    No current facility-administered medications for this encounter.        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    Irritable bowel syndrome K58.9    Peptic ulcer K27.9    Fatigue R53.83    Bradycardia R00.1    Bilateral carpal tunnel syndrome G56.03    Cigarette nicotine dependence without complication O57.397    Dermatitis L30.9    Dysphagia R13.10    Mixed irritable bowel syndrome K58.2    Unilateral recurrent inguinal hernia without obstruction or gangrene K40.91    Chronic heartburn R12       Past Medical History:        Diagnosis Date    Hyperlipidemia     Hypertension     IBS (irritable bowel syndrome)        Past Surgical History:        Procedure Laterality Date    APPENDECTOMY  1974    COLONOSCOPY  2012    Dr. Sukhwinder Griffin  10/2011    Exc Inclusion Cyst Rt Buttock, Perianal Area, Fistulotomy, Ext Hemorrhoids    SKIN CANCER EXCISION  8/2011    back of neck - precancerous    UPPER GASTROINTESTINAL ENDOSCOPY  2010    Dr. Lottie Durand- Esophagous stretched. Social History:    Social History     Tobacco Use    Smoking status: Light Tobacco Smoker     Packs/day: 0.50     Years: 30.00     Pack years: 15.00    Smokeless tobacco: Never Used   Substance Use Topics    Alcohol use: Yes     Comment: rarely                                Ready to quit: Not Answered  Counseling given: Not Answered      Vital Signs (Current): There were no vitals filed for this visit. BP Readings from Last 3 Encounters:   05/26/21 (!) 102/56   05/11/21 112/72   11/05/20 122/70       NPO Status:                                                                                 BMI:   Wt Readings from Last 3 Encounters:   05/26/21 172 lb 12.8 oz (78.4 kg)   05/11/21 176 lb 8 oz (80.1 kg)   11/05/20 172 lb 8 oz (78.2 kg)     There is no height or weight on file to calculate BMI.    CBC:   Lab Results   Component Value Date    WBC 5.70 03/15/2012    RBC 4.44 03/15/2012    HGB 13.3 03/15/2012    HCT 37.6 03/15/2012    MCV 84.7 03/15/2012    RDW 13.2 03/15/2012     03/15/2012       CMP:   Lab Results   Component Value Date     05/19/2021     03/15/2012    K 3.3 05/19/2021    K 3.8 03/15/2012     05/19/2021     03/15/2012    CO2 29 05/19/2021    BUN 12 05/19/2021    CREATININE 0.9 05/19/2021    CREATININE 1.0 03/15/2012    GFRAA >59 05/19/2021    LABGLOM >60 05/19/2021    GLUCOSE 82 05/19/2021    PROT 6.5 05/19/2021    PROT 6.3 03/15/2012    CALCIUM 9.1 05/19/2021    BILITOT 0.4 05/19/2021    ALKPHOS 81 05/19/2021    ALKPHOS 59 03/15/2012    AST 14 05/19/2021    ALT 10 05/19/2021       POC Tests: No results for input(s): POCGLU, POCNA, POCK, POCCL, POCBUN, POCHEMO, POCHCT in the last 72 hours.     Coags:   Lab Results   Component Value Date    PROTIME 10.36 03/15/2012    INR 0.96 03/15/2012       HCG (If Applicable): No results found for: PREGTESTUR, PREGSERUM, HCG, HCGQUANT     ABGs: No results found for: PHART, PO2ART, GTQ2ULE, XHU4VTI, BEART, C5RRILKP     Type & Screen (If Applicable):  No results found for: LABABO, LABRH    Drug/Infectious Status (If Applicable):  No results found for: HIV, HEPCAB    COVID-19 Screening (If Applicable): No results found for: COVID19        Anesthesia Evaluation  Patient summary reviewed  Airway: Mallampati: II  TM distance: >3 FB   Neck ROM: full  Mouth opening: < 3 FB Dental: normal exam         Pulmonary:normal exam (+) COPD:  current smoker          Patient smoked on day of surgery. ROS comment: \"lung granuloma\"   Cardiovascular:    (+) hypertension:, dysrhythmias (bradycardia):, hyperlipidemia         Beta Blocker:  Dose within 24 Hrs         Neuro/Psych:   Negative Neuro/Psych ROS              GI/Hepatic/Renal:   (+) GERD:, PUD, liver disease (liver cyst):,          ROS comment: etoh use per chart  dysphagia. Endo/Other: Negative Endo/Other ROS                    Abdominal:           Vascular: negative vascular ROS. Anesthesia Plan      general and TIVA     ASA 3       Induction: intravenous. Anesthetic plan and risks discussed with patient.                       Bulmaro Muniz, APRN - CRNA   6/3/2021

## 2021-06-04 RX ORDER — UBIDECARENONE 75 MG
50 CAPSULE ORAL DAILY
COMMUNITY

## 2021-06-04 RX ORDER — ASCORBIC ACID 500 MG
500 TABLET ORAL DAILY
COMMUNITY

## 2021-06-04 RX ORDER — ROSUVASTATIN CALCIUM 10 MG/1
1 TABLET, COATED ORAL DAILY
COMMUNITY
Start: 2020-11-30

## 2021-06-04 RX ORDER — ASPIRIN 81 MG/1
81 TABLET ORAL DAILY
COMMUNITY

## 2021-06-04 RX ORDER — METOPROLOL SUCCINATE 50 MG/1
1 TABLET, EXTENDED RELEASE ORAL DAILY
COMMUNITY
Start: 2020-12-27

## 2021-06-04 RX ORDER — LOSARTAN POTASSIUM AND HYDROCHLOROTHIAZIDE 12.5; 1 MG/1; MG/1
1 TABLET ORAL DAILY
COMMUNITY
Start: 2020-12-27

## 2021-06-04 RX ORDER — DIPHENOXYLATE HYDROCHLORIDE AND ATROPINE SULFATE 2.5; .025 MG/1; MG/1
1 TABLET ORAL 2 TIMES DAILY
COMMUNITY
Start: 2021-05-25 | End: 2021-12-27

## 2021-06-04 RX ORDER — BUPROPION HYDROCHLORIDE 150 MG/1
1 TABLET, EXTENDED RELEASE ORAL DAILY
COMMUNITY
Start: 2021-01-20

## 2021-06-04 RX ORDER — HYOSCYAMINE SULFATE 0.125 MG
0.12 TABLET ORAL DAILY
COMMUNITY

## 2021-06-06 NOTE — H&P
Patient Name: Lorenza Clark  : 1955  MRN: [id number]  DATE: 6/3/21    Allergies: Allergies   Allergen Reactions    Adhesive Tape     Dexamethasone      Severe hiccups placed in hospital for 3 days    Celebrex [Celecoxib] Rash        ENDOSCOPY  History and Physical    Procedure:    [] Diagnostic Colonoscopy       [] Screening Colonoscopy  [x] EGD      [] ERCP      [] EUS       [] Other    [x] Previous office notes/History and Physical reviewed from the patients chart. Please see EMR for further details of HPI. I have examined the patient's status immediately prior to the procedure and:      Indications/HPI:    [x]Abdominal Pain   []Cancer- GI/Lung     []Fhx of colon CA/polyps  []History of Polyps  []Barretts            []Melena  []Abnormal Imaging              [x]Dysphagia              []Persistent Pneumonia   []Anemia                            []Food Impaction        []History of Polyps  [] GI Bleed             []Pulmonary nodule/Mass   []Change in bowel habits []Heartburn/Reflux  []Rectal Bleed (BRBPR)  []Chest Pain - Non Cardiac []Heme (+) Stool []Ulcers  []Constipation  []Hemoptysis  []Varices  []Diarrhea  []Hypoxemia    []Nausea/Vomiting   []Screening   []Crohns/Colitis  []Other:     Anesthesia:   [x] MAC [] Moderate Sedation   [] General   [] None     ROS: 12 pt Review of Symptoms was negative unless mentioned above    Medications:   Prior to Admission medications    Medication Sig Start Date End Date Taking?  Authorizing Provider   PHENobarbital (LUMINAL) 16.2 MG tablet TAKE 1 TABLET BY MOUTH EVERY DAY 21 Yes Grisel Bear MD   diphenoxylate-atropine (LOMOTIL) 2.5-0.025 MG per tablet TAKE 1 TABLET TWICE A DAY 21 Yes Grisel Bear MD   buPROPion [facility] SR) 150 MG extended release tablet TAKE 1 TABLET DAILY 21  Yes Grisel Bear MD   metoprolol succinate (TOPROL XL) 50 MG extended release tablet TAKE 1 TABLET DAILY 20  Yes Grisel Bear MD losartan-hydroCHLOROthiazide (HYZAAR) 100-12.5 MG per tablet TAKE 1 TABLET DAILY 12/27/20  Yes Wicho Zaidi MD   rosuvastatin (CRESTOR) 10 MG tablet TAKE 1 TABLET DAILY 11/30/20  Yes Aiyana Cabrera MD   hyoscyamine (ANASPAZ;LEVSIN) 125 MCG tablet Take 1 tablet by mouth daily 11/23/20 6/3/21 Yes Wicho Zaidi MD   esomeprazole (NEXIUM) 40 MG delayed release capsule Take 1 capsule by mouth every morning (before breakfast)  Patient taking differently: Take 20 mg by mouth every morning (before breakfast) OTC 7/15/20  Yes OMAR Garcia   clotrimazole-betamethasone (LOTRISONE) 1-0.05 % cream APPLY TO AFFECTED AREA  2 TO 3 TIMES PER DAY 4/28/20  Yes Wicho Zaidi MD   Ascorbic Acid (VITAMIN C) 500 MG tablet Take 500 mg by mouth daily. Yes Historical Provider, MD   vitamin B-12 (CYANOCOBALAMIN) 100 MCG tablet Take 50 mcg by mouth daily. Yes Historical Provider, MD   aspirin 81 MG EC tablet Take 81 mg by mouth daily. Yes Historical Provider, MD       Past Medical History:  Past Medical History:   Diagnosis Date    GERD (gastroesophageal reflux disease)     Hyperlipidemia     Hypertension     IBS (irritable bowel syndrome)        Past Surgical History:  Past Surgical History:   Procedure Laterality Date    APPENDECTOMY  1974    COLONOSCOPY  2012    Dr. Ashu Weinstein  10/2011    Exc Inclusion Cyst Rt Buttock, Perianal Area, Fistulotomy, Ext Hemorrhoids    SKIN CANCER EXCISION  08/2011    back of neck - precancerous    UPPER GASTROINTESTINAL ENDOSCOPY  2010    Dr. Sulma Montoya- Esophagous stretched.     UPPER GASTROINTESTINAL ENDOSCOPY  06/03/2021    Dr Smita [id number] appearing stricture noted in the distal esophagus, gastritis    UPPER GASTROINTESTINAL ENDOSCOPY N/A 6/3/2021    EGD BIOPSY performed by Nina Multani MD at [address] [facility] N/A 6/3/2021    EGD DILATION SAVORY 54 FR performed by Veronica Boyce Lorena Soria MD at [facility] OR       Social History:  Social History     Tobacco Use    Smoking status: Light Tobacco Smoker     Packs/day: 0.50     Years: 30.00     Pack years: 15.00    Smokeless tobacco: Never Used   Vaping Use    Vaping Use: Never used   Substance Use Topics    Alcohol use: Not Currently     Comment: rarely    Drug use: No       Vital Signs:   Vitals:    06/03/21 1211   BP: 114/68   Pulse: (!) 49   Resp: 18   Temp:    SpO2: 99%        Physical Exam:  Cardiac:  [x]WNL  []Comments:  Pulmonary:  [x]WNL   []Comments:  Neuro/Mental Status:  [x]WNL  []Comments:  Abdominal:  [x]WNL    []Comments:  Other:   []WNL  []Comments:    Informed Consent:  The risks and benefits of the procedure have been discussed with either the patient or if they cannot consent, their representative. Assessment:  Patient examined and appropriate for planned sedation and procedure. Plan:  Proceed with planned sedation and procedure as above.          Anali Del Cid MD

## 2021-06-08 ENCOUNTER — OFFICE VISIT (OUTPATIENT)
Dept: CARDIOLOGY | Facility: CLINIC | Age: 66
End: 2021-06-08

## 2021-06-08 VITALS
WEIGHT: 168 LBS | OXYGEN SATURATION: 97 % | DIASTOLIC BLOOD PRESSURE: 86 MMHG | HEIGHT: 70 IN | BODY MASS INDEX: 24.05 KG/M2 | SYSTOLIC BLOOD PRESSURE: 130 MMHG | HEART RATE: 46 BPM

## 2021-06-08 DIAGNOSIS — I25.10 CORONARY ARTERY CALCIFICATION SEEN ON CT SCAN: Primary | ICD-10-CM

## 2021-06-08 DIAGNOSIS — Z72.0 TOBACCO ABUSE: ICD-10-CM

## 2021-06-08 DIAGNOSIS — E78.2 MIXED HYPERLIPIDEMIA: ICD-10-CM

## 2021-06-08 DIAGNOSIS — I10 ESSENTIAL HYPERTENSION: ICD-10-CM

## 2021-06-08 PROCEDURE — 99204 OFFICE O/P NEW MOD 45 MIN: CPT | Performed by: INTERNAL MEDICINE

## 2021-06-08 PROCEDURE — 93000 ELECTROCARDIOGRAM COMPLETE: CPT | Performed by: INTERNAL MEDICINE

## 2021-06-08 RX ORDER — PHENOBARBITAL 16.2 MG/1
1 TABLET ORAL DAILY
COMMUNITY
Start: 2021-05-25

## 2021-06-08 RX ORDER — ESOMEPRAZOLE MAGNESIUM 40 MG/1
40 CAPSULE, DELAYED RELEASE ORAL
COMMUNITY

## 2021-06-08 NOTE — PROGRESS NOTES
Subjective:     Encounter Date:06/08/2021      Patient ID: Cortez Lam is a 66 y.o. male with a history of hypertension, hyperlipidemia, tobacco abuse, COPD, presenting for further evaluation after an abnormal CT showed coronary artery calcification.    Referring Provider: Saniya Abrams MD  Reason for Referral: Plaque in heart artery    Chief Complaint: Here for further evaluation of an abnormal CT scan of the chest    This is a 66-year-old male with a history of hypertension, hyperlipidemia, tobacco abuse, presenting for further evaluation after a CT scan showed coronary artery calcification.  The patient says that he has become worried about the finding of coronary artery calcification, however he has been feeling very well.  The patient says that he is very physically active with his work and at home and has not noticed any symptoms with exertion.  In particular, no chest pain, shortness of breath, dyspnea exertion or decline in exercise tolerance.  The patient denies palpitations, lightheadedness, dizziness or syncope.  No orthopnea, PND, edema.  The patient does have history of hypertension and his blood pressure is generally well controlled.  He also has a history of hyperlipidemia with cholesterol being well controlled.  The patient is a smoker and does have COPD.  He has occasional cough and wheezing.  Overall, he says that he is doing well and feeling well at this time, however.    Hypertension  This is a chronic problem. The problem is unchanged. The problem is controlled. Pertinent negatives include no blurred vision, chest pain, headaches, neck pain, orthopnea, palpitations, peripheral edema, PND or shortness of breath. There are no associated agents to hypertension. Risk factors for coronary artery disease include male gender, smoking/tobacco exposure and dyslipidemia. Current antihypertension treatment includes beta blockers, diuretics and angiotensin blockers. The current treatment  provides significant improvement. There are no compliance problems.  There is no history of angina, CVA or heart failure.   Hyperlipidemia  This is a chronic problem. The problem is controlled. Recent lipid tests were reviewed and are normal. Factors aggravating his hyperlipidemia include beta blockers and smoking. Pertinent negatives include no chest pain, focal sensory loss, focal weakness, leg pain, myalgias or shortness of breath. Current antihyperlipidemic treatment includes statins. The current treatment provides significant improvement of lipids. There are no compliance problems.  Risk factors for coronary artery disease include male sex, hypertension, dyslipidemia and family history.     The following portions of the patient's history were reviewed and updated as appropriate: allergies, current medications, past family history, past medical history, past social history, past surgical history and problem list.     Past Medical History:   Diagnosis Date   • COPD (chronic obstructive pulmonary disease) (CMS/[facility])    • GERD (gastroesophageal reflux disease)    • Hyperlipidemia    • Hypertension      Past Surgical History:   Procedure Laterality Date   • APPENDECTOMY         Current Outpatient Medications:   •  ascorbic acid (VITAMIN C) 500 MG tablet, Take 500 mg by mouth Daily., Disp: , Rfl:   •  aspirin (aspirin) 81 MG EC tablet, Take 81 mg by mouth Daily., Disp: , Rfl:   •  buPROPion SR (WELLBUTRIN SR) 150 MG 12 hr tablet, Take 1 tablet by mouth Daily., Disp: , Rfl:   •  diphenoxylate-atropine (LOMOTIL) 2.5-0.025 MG per tablet, Take 1 tablet by mouth 2 (two) times a day., Disp: , Rfl:   •  esomeprazole (nexIUM) 40 MG capsule, Take 40 mg by mouth Every Morning Before Breakfast., Disp: , Rfl:   •  hyoscyamine (ANASPAZ,LEVSIN) 0.125 MG tablet, Take 0.125 mg by mouth Daily., Disp: , Rfl:   •  losartan-hydrochlorothiazide (HYZAAR) 100-12.5 MG per tablet, Take 1 tablet by mouth Daily., Disp: , Rfl:   •  metoprolol  succinate XL (TOPROL-XL) 50 MG 24 hr tablet, Take 1 tablet by mouth Daily., Disp: , Rfl:   •  PHENobarbital (LUMINAL) 16.2 MG tablet, Take 1 tablet by mouth Daily., Disp: , Rfl:   •  rosuvastatin (CRESTOR) 10 MG tablet, Take 1 tablet by mouth Daily., Disp: , Rfl:   •  vitamin B-12 (cyanocobalamin) 100 MCG tablet, Take 50 mcg by mouth Daily., Disp: , Rfl:     Allergies   Allergen Reactions   • Dexamethasone Other (See Comments)     Severe hiccups placed in hospital for 3 days   • Celecoxib Rash     Social History     Tobacco Use   • Smoking status: Heavy Tobacco Smoker     Packs/day: 0.50     Years: 30.00     Pack years: 15.00     Types: Cigarettes   • Smokeless tobacco: Never Used   Substance Use Topics   • Alcohol use: Yes     Family History   Problem Relation Age of Onset   • Hypertension Mother    • Alzheimer's disease Mother    • Hypertension Father    • Cancer Father         LUNG     Review of Systems   Constitutional: Negative for chills, fever and weight loss.   HENT: Negative for congestion and hearing loss.    Eyes: Negative for blurred vision and pain.   Cardiovascular: Negative for chest pain, claudication, dyspnea on exertion, leg swelling, orthopnea, palpitations, paroxysmal nocturnal dyspnea and syncope.   Respiratory: Negative for cough, shortness of breath and wheezing.    Endocrine: Negative for cold intolerance and heat intolerance.   Hematologic/Lymphatic: Negative for adenopathy and bleeding problem.   Skin: Negative for color change, poor wound healing and rash.   Musculoskeletal: Negative for arthritis, myalgias and neck pain.   Gastrointestinal: Negative for abdominal pain, change in bowel habit, heartburn, nausea and vomiting.   Genitourinary: Negative for dysuria and frequency.   Neurological: Negative for dizziness, focal weakness, headaches, light-headedness, loss of balance and numbness.   Psychiatric/Behavioral: Negative for altered mental status, memory loss and substance abuse.    Allergic/Immunologic: Negative for hives and persistent infections.         ECG 12 Lead    Date/Time: 6/8/2021 9:59 AM  Performed by: Jose G Pathak MD  Authorized by: Jose G Pathak MD   Comparison: compared with previous ECG from 4/8/2015  Similar to previous ECG  Rhythm: sinus bradycardia  Rate: bradycardic  BPM: 46  Conduction: incomplete right bundle branch block  ST Segments: ST segments normal  T Waves: T waves normal  QRS axis: normal    Clinical impression: non-specific ECG               Objective:     Vitals reviewed.   Constitutional:       General: Not in acute distress.     Appearance: Normal and healthy appearance. Well-developed. Not toxic-appearing or diaphoretic.   Eyes:      General: Lids are normal.      Extraocular Movements: Extraocular movements intact.      Pupils: Pupils are equal, round, and reactive to light.   HENT:      Head: Normocephalic and atraumatic.      Right Ear: External ear normal.      Left Ear: External ear normal.    Mouth/Throat:      Mouth: Mucous membranes are not pale, not dry and not cyanotic.         Comments: Not examined - mask in place  Neck:      Thyroid: No thyroid mass or thyromegaly.      Vascular: No carotid bruit, hepatojugular reflux or JVD.      Trachea: No tracheal deviation.      Lymphadenopathy: No cervical adenopathy.   Pulmonary:      Effort: Pulmonary effort is normal. No accessory muscle usage or respiratory distress.      Breath sounds: Normal breath sounds. No wheezing. No rhonchi. No rales.   Chest:      Chest wall: Not tender to palpatation.   Cardiovascular:      Normal rate. Regular rhythm.      Murmurs: There is no murmur.      No gallop.   Pulses:     Intact distal pulses.   Edema:     Peripheral edema absent.   Abdominal:      General: Bowel sounds are normal. There is no distension or abdominal bruit.      Palpations: Abdomen is soft. There is no pulsatile midline mass.      Tenderness: There is no abdominal tenderness.  "  Musculoskeletal: Normal range of motion.         General: No tenderness or deformity.      Extremities: No clubbing present.     Cervical back: Normal range of motion and neck supple. No edema. Skin:     General: Skin is warm and dry. There is no cyanosis.      Findings: No erythema or rash.   Neurological:      General: No focal deficit present.      Mental Status: Oriented to person, place, and time and oriented to person, place and time.      Cranial Nerves: No cranial nerve deficit.   Psychiatric:         Attention and Perception: Attention normal.         Mood and Affect: Mood normal.         Speech: Speech normal.         Behavior: Behavior normal. Behavior is cooperative.         Thought Content: Thought content normal.       /86   Pulse (!) 46   Ht 177.8 cm (70\")   Wt 76.2 kg (168 lb)   SpO2 97%   BMI 24.11 kg/m²     Data/Lab Review:     Lab Results   Component Value Date    CHLPL 122 (L) 05/19/2021    TRIG 150 (H) 05/19/2021    HDL 38 (L) 05/19/2021    LDL 54 05/19/2021     CT Chest 5/25/21:  1. Stable low-dose screening chest CT, with evidence of healed   granulomatous disease, no lung mass, suspicious pulmonary nodule or   evidence of pulmonary malignancy is identified. Lung RADS category 2,   benign. Follow-up screening low-dose chest CT is recommended in 12   months.   2. Heavy calcified plaque is again noted in the LAD coronary artery distribution.   3. Probable cyst in the dome of the liver measuring 2.9 cm, compared with 2.7 cm on the previous CT.     From CT Chest 3/2019:  Normal thoracic aorta are seen. Severe atheromatous changes of   coronary arteries are seen more pronounced in the left coronary artery   and branches.    From CT Chest 11/2013:  Atheromatous changes of the coronary arteries are seen more severely in the proximal part of the anterior descending branch of the left coronary artery.  This is unchanged since the previous study.         Assessment:          Diagnosis Plan "   1. Coronary artery calcification seen on CT scan  ECG 12 Lead   2. Essential hypertension     3. Mixed hyperlipidemia     4. Tobacco abuse          Plan:       1.  Coronary artery calcification seen on CT scan: The patient was noted to have coronary artery calcification noted on his most recent CT scan, however serial assessments dating away back to 2013 also show coronary artery calcifications.  At this time, patient is clinically stable and asymptomatic.  Therefore, the presumption might be that he has coronary artery disease, however this is likely nonobstructive disease given the fact that the patient is completely asymptomatic at this time.  Therefore, would not recommend any further cardiac work-up at this particular time but would continue to aggressively treat cardiac risk factors, as is already being appropriately done.  The patient should continue aspirin 81 mg daily indefinitely.  In addition, the patient should try to achieve good blood pressure control and should continue beta-blocker therapy along with other antihypertensive therapies.  The patient's LDL cholesterol is well controlled on his current dose of statin therapy as well.  We also talked about the benefits of smoking cessation, cardiac diet and regular exercise.    2.  Essential hypertension: The patient's blood pressure is currently well controlled.  Continue Toprol-XL and Hyzaar.    3.  Mixed hyperlipidemia: The patient's LDL cholesterol is under good control at this time on his current dose of Crestor.    Cotrez Lam  reports that he has been smoking cigarettes. He has a 15.00 pack-year smoking history. He has never used smokeless tobacco.I have educated him on the risk of diseases from using tobacco products such as COPD and heart disease.  I advised him to quit and he is not willing to quit. I spent 3  minutes counseling the patient.    We will plan to see the patient back as needed at this time but will be more than happy to see him  again at any point in future if he develops any active cardiac issues.

## 2021-06-14 RX ORDER — LOSARTAN POTASSIUM AND HYDROCHLOROTHIAZIDE 12.5; 1 MG/1; MG/1
TABLET ORAL
Qty: 90 TABLET | Refills: 1 | Status: SHIPPED | OUTPATIENT
Start: 2021-06-14 | End: 2021-12-12

## 2021-06-24 DIAGNOSIS — K58.0 IRRITABLE BOWEL SYNDROME WITH DIARRHEA: ICD-10-CM

## 2021-06-24 RX ORDER — DIPHENOXYLATE HYDROCHLORIDE AND ATROPINE SULFATE 2.5; .025 MG/1; MG/1
TABLET ORAL
Qty: 60 TABLET | Refills: 2 | Status: SHIPPED | OUTPATIENT
Start: 2021-06-24 | End: 2021-09-22 | Stop reason: SDUPTHER

## 2021-06-24 NOTE — TELEPHONE ENCOUNTER
Joselito Mcclure called to request a refill on his medication. Last office visit : 5/11/2021   Next office visit : 8/11/2021     Last UDS:   Amphetamine Screen, Urine   Date Value Ref Range Status   01/09/2020 neg  Final     Barbiturate Screen, Urine   Date Value Ref Range Status   01/09/2020 pos  Final     Benzodiazepine Screen, Urine   Date Value Ref Range Status   01/09/2020 neg  Final     Buprenorphine Urine   Date Value Ref Range Status   01/09/2020 neg  Final     Cocaine Metabolite Screen, Urine   Date Value Ref Range Status   01/09/2020 neg  Final     Gabapentin Screen, Urine   Date Value Ref Range Status   01/09/2020 neg  Final     MDMA, Urine   Date Value Ref Range Status   01/09/2020 neg  Final     Methamphetamine, Urine   Date Value Ref Range Status   01/09/2020 neg  Final     Opiate Scrn, Ur   Date Value Ref Range Status   01/09/2020 neg  Final     Oxycodone Screen, Ur   Date Value Ref Range Status   01/09/2020 neg  Final     PCP Screen, Urine   Date Value Ref Range Status   01/09/2020 neg  Final     Propoxyphene Screen, Urine   Date Value Ref Range Status   01/09/2020 neg  Final     THC Screen, Urine   Date Value Ref Range Status   01/09/2020 neg  Final     Tricyclic Antidepressants, Urine   Date Value Ref Range Status   01/09/2020 neg  Final       Last Kelly Balboa: 6-24-21  Medication Contract: Due   Last Fill: 5-24-21    Requested Prescriptions     Pending Prescriptions Disp Refills    diphenoxylate-atropine (LOMOTIL) 2.5-0.025 MG per tablet [Pharmacy Med Name: DIPHENOXYLATE-ATROP 2.5-0.025] 60 tablet 0     Sig: TAKE 1 TABLET BY MOUTH TWICE A DAY         Please approve or refuse this medication.    Gifty Barlow MA

## 2021-07-28 RX ORDER — TRIAMCINOLONE ACETONIDE 5 MG/G
CREAM TOPICAL
Qty: 30 G | Refills: 3 | Status: SHIPPED | OUTPATIENT
Start: 2021-07-28 | End: 2022-09-02

## 2021-08-11 ENCOUNTER — OFFICE VISIT (OUTPATIENT)
Dept: FAMILY MEDICINE CLINIC | Age: 66
End: 2021-08-11
Payer: COMMERCIAL

## 2021-08-11 VITALS
OXYGEN SATURATION: 99 % | DIASTOLIC BLOOD PRESSURE: 60 MMHG | HEIGHT: 70 IN | HEART RATE: 65 BPM | BODY MASS INDEX: 23.91 KG/M2 | TEMPERATURE: 97.6 F | RESPIRATION RATE: 18 BRPM | SYSTOLIC BLOOD PRESSURE: 94 MMHG | WEIGHT: 167 LBS

## 2021-08-11 DIAGNOSIS — E78.01 FAMILIAL HYPERCHOLESTEROLEMIA: ICD-10-CM

## 2021-08-11 DIAGNOSIS — K58.9 IRRITABLE BOWEL SYNDROME, UNSPECIFIED TYPE: ICD-10-CM

## 2021-08-11 DIAGNOSIS — I10 ESSENTIAL (PRIMARY) HYPERTENSION: Primary | ICD-10-CM

## 2021-08-11 DIAGNOSIS — L08.9 FINGER INFECTION: ICD-10-CM

## 2021-08-11 PROCEDURE — 99214 OFFICE O/P EST MOD 30 MIN: CPT | Performed by: NURSE PRACTITIONER

## 2021-08-11 NOTE — PROGRESS NOTES
Carlee Maxwell is a 73 y.o. male who presents today for  Chief Complaint   Patient presents with    Follow-up       HPI:  Hypertension  Compliant with medications. No adverse effects from medication. No lightheadedness, palpitations, or chest pain. BP chronic currently runs on the low side, not symptomatic. Hyperlipidemia  Tolerating current cholesterol medication without side effects. No body aches. Attempting to reduce processed sugar and cholesterol from diet. IBS stable on Lomotil. He sustained an injury to his left hand from chemical exposure at work 2 weeks ago. He has some skin cracking/peeling between the index and middle finger and middle and fourth finger. He has been using OTC lotion for moisture. Review of Systems   Constitutional: Negative for chills and fever. HENT: Negative for congestion, ear pain and sore throat. Respiratory: Negative for cough, chest tightness, shortness of breath and wheezing. Cardiovascular: Negative for chest pain. Gastrointestinal: Negative for abdominal pain, diarrhea and nausea. Musculoskeletal: Negative for arthralgias and myalgias. Skin: Positive for wound (wound from chemical exposure L hand). Negative for rash. Past Medical History:   Diagnosis Date    GERD (gastroesophageal reflux disease)     Hyperlipidemia     Hypertension     IBS (irritable bowel syndrome)        Current Outpatient Medications   Medication Sig Dispense Refill    mupirocin (BACTROBAN) 2 % ointment Apply topically 2 times daily x 7-10 days.  22 g 0    triamcinolone (ARISTOCORT) 0.5 % cream APPLY TO AFFECTED AREA 3 TIMES A DAY 30 g 3    diphenoxylate-atropine (LOMOTIL) 2.5-0.025 MG per tablet TAKE 1 TABLET BY MOUTH TWICE A DAY 60 tablet 2    losartan-hydroCHLOROthiazide (HYZAAR) 100-12.5 MG per tablet TAKE 1 TABLET DAILY 90 tablet 1    buPROPion (WELLBUTRIN SR) 150 MG extended release tablet TAKE 1 TABLET DAILY 90 tablet 3    metoprolol succinate (TOPROL XL) 50 MG extended release tablet TAKE 1 TABLET DAILY 90 tablet 3    rosuvastatin (CRESTOR) 10 MG tablet TAKE 1 TABLET DAILY 90 tablet 3    hyoscyamine (ANASPAZ;LEVSIN) 125 MCG tablet Take 1 tablet by mouth daily 90 tablet 3    esomeprazole (NEXIUM) 40 MG delayed release capsule Take 1 capsule by mouth every morning (before breakfast) (Patient taking differently: Take 20 mg by mouth every morning (before breakfast) OTC) 30 capsule 5    clotrimazole-betamethasone (LOTRISONE) 1-0.05 % cream APPLY TO AFFECTED AREA  2 TO 3 TIMES PER DAY 1 Tube 3    Ascorbic Acid (VITAMIN C) 500 MG tablet Take 500 mg by mouth daily.  vitamin B-12 (CYANOCOBALAMIN) 100 MCG tablet Take 50 mcg by mouth daily.  aspirin 81 MG EC tablet Take 81 mg by mouth daily. No current facility-administered medications for this visit. Allergies   Allergen Reactions    Adhesive Tape     Dexamethasone      Severe hiccups placed in hospital for 3 days    Celebrex [Celecoxib] Rash       Past Surgical History:   Procedure Laterality Date    APPENDECTOMY  1974    COLONOSCOPY  2012    Dr. Rosio Sanchez  10/2011    Exc Inclusion Cyst Rt Buttock, Perianal Area, Fistulotomy, Ext Hemorrhoids    SKIN CANCER EXCISION  08/2011    back of neck - precancerous    UPPER GASTROINTESTINAL ENDOSCOPY  2010    Dr. Fabienne Vázquez- Esophagous stretched.     UPPER GASTROINTESTINAL ENDOSCOPY N/A 06/03/2021    Dr Moises [id number] appearing stricture noted in the distal esophagus, gastritis    UPPER GASTROINTESTINAL ENDOSCOPY N/A 06/03/2021    Dr Moises [id number] appearing stricture noted in the distal esophagus, gastritis       Social History     Tobacco Use    Smoking status: Light Tobacco Smoker     Packs/day: 0.50     Years: 30.00     Pack years: 15.00    Smokeless tobacco: Never Used   Vaping Use    Vaping Use: Never used   Substance Use Topics    Alcohol use: Not Currently Comment: rarely    Drug use: No       Family History   Problem Relation Age of Onset    High Blood Pressure Mother     Alzheimer's Disease Mother     High Blood Pressure Father     Cancer Father         lung    Colon Cancer Neg Hx     Esophageal Cancer Neg Hx     Liver Cancer Neg Hx     Rectal Cancer Neg Hx     Stomach Cancer Neg Hx        BP 94/60   Pulse 65   Temp 97.6 °F (36.4 °C) (Temporal)   Resp 18   Ht 5' 10\" (1.778 m)   Wt 167 lb (75.8 kg)   SpO2 99%   BMI 23.96 kg/m²     Physical Exam  Vitals reviewed. Constitutional:       General: He is not in acute distress. Appearance: Normal appearance. He is well-developed. HENT:      Head: Normocephalic. Eyes:      Conjunctiva/sclera: Conjunctivae normal.      Pupils: Pupils are equal, round, and reactive to light. Neck:      Thyroid: No thyromegaly. Vascular: No carotid bruit or JVD. Trachea: No tracheal deviation. Cardiovascular:      Rate and Rhythm: Normal rate and regular rhythm. Heart sounds: Normal heart sounds. No murmur heard. Pulmonary:      Effort: Pulmonary effort is normal. No respiratory distress. Breath sounds: Normal breath sounds. Musculoskeletal:         General: Normal range of motion. Cervical back: Normal range of motion and neck supple. Lymphadenopathy:      Cervical: No cervical adenopathy. Skin:     General: Skin is warm and dry. Findings: No rash. Comments: Skin cracking with redness noted between index and middle finger and middle and 4th finger of L hand. No active drainage. Neurological:      Mental Status: He is alert. Psychiatric:         Mood and Affect: Mood normal.         Behavior: Behavior normal.         Thought Content: Thought content normal.         ASSESSMENT/PLAN:  1. Essential (primary) hypertension  -Stable, runs on the low side chronically. Not symptomatic  -Continue current medication.     2. Familial hypercholesterolemia  -Stable, tolerating statin. 3. Finger infection  -Mupirocin twice daily. -Advised to clean gently with soap/water. Avoid exposure to irritating solvents. Needs to keep bandaged or wear rubber glove at work. 4. Irritable bowel syndrome, unspecified type  -Stable, continue Lomotil as needed. Follow-up with Dr. Krys Frances in 6 months         Return in about 6 months (around 2/11/2022) for follow up. Ruthann Corona was seen today for follow-up. Diagnoses and all orders for this visit:    Essential (primary) hypertension    Familial hypercholesterolemia    Finger infection    Irritable bowel syndrome, unspecified type    Other orders  -     mupirocin (BACTROBAN) 2 % ointment; Apply topically 2 times daily x 7-10 days. There are no discontinued medications. There are no Patient Instructions on file for this visit. Patient voicesunderstanding and agrees to plans along with risks and benefits of plan. Counseling:  Mandiradha Sheltoncammie's case, medications and options were discussed in detail. Patient was instructed to call the office if he questionsregarding him treatment. Should him conditions worsen, he should return to office to be reassessed by OMAR Celaya. he Should to go the closest Emergency Department for any emergency. They verbalizedunderstanding the above instructions. Return in about 6 months (around 2/11/2022) for follow up.

## 2021-08-12 ASSESSMENT — ENCOUNTER SYMPTOMS
DIARRHEA: 0
SHORTNESS OF BREATH: 0
CHEST TIGHTNESS: 0
WHEEZING: 0
ABDOMINAL PAIN: 0
COUGH: 0
NAUSEA: 0
SORE THROAT: 0

## 2021-09-22 DIAGNOSIS — K58.0 IRRITABLE BOWEL SYNDROME WITH DIARRHEA: ICD-10-CM

## 2021-09-22 RX ORDER — DIPHENOXYLATE HYDROCHLORIDE AND ATROPINE SULFATE 2.5; .025 MG/1; MG/1
TABLET ORAL
Qty: 60 TABLET | Refills: 3 | Status: SHIPPED | OUTPATIENT
Start: 2021-09-22 | End: 2022-01-13

## 2021-09-22 RX ORDER — HYOSCYAMINE SULFATE 0.125 MG
TABLET ORAL
Qty: 90 TABLET | Refills: 3 | Status: SHIPPED | OUTPATIENT
Start: 2021-09-22 | End: 2022-10-07

## 2021-09-22 NOTE — TELEPHONE ENCOUNTER
Lucia Elie called to request a refill on his medication. Last office visit : 8/11/2021   Next office visit : 9/22/2021     Last UDS:   Amphetamine Screen, Urine   Date Value Ref Range Status   01/09/2020 neg  Final     Barbiturate Screen, Urine   Date Value Ref Range Status   01/09/2020 pos  Final     Benzodiazepine Screen, Urine   Date Value Ref Range Status   01/09/2020 neg  Final     Buprenorphine Urine   Date Value Ref Range Status   01/09/2020 neg  Final     Cocaine Metabolite Screen, Urine   Date Value Ref Range Status   01/09/2020 neg  Final     Gabapentin Screen, Urine   Date Value Ref Range Status   01/09/2020 neg  Final     MDMA, Urine   Date Value Ref Range Status   01/09/2020 neg  Final     Methamphetamine, Urine   Date Value Ref Range Status   01/09/2020 neg  Final     Opiate Scrn, Ur   Date Value Ref Range Status   01/09/2020 neg  Final     Oxycodone Screen, Ur   Date Value Ref Range Status   01/09/2020 neg  Final     PCP Screen, Urine   Date Value Ref Range Status   01/09/2020 neg  Final     Propoxyphene Screen, Urine   Date Value Ref Range Status   01/09/2020 neg  Final     THC Screen, Urine   Date Value Ref Range Status   01/09/2020 neg  Final     Tricyclic Antidepressants, Urine   Date Value Ref Range Status   01/09/2020 neg  Final       Last Jane Daryl: today  Medication Contract: 1/20   Last Fill: 8/25/21    Requested Prescriptions     Pending Prescriptions Disp Refills    diphenoxylate-atropine (LOMOTIL) 2.5-0.025 MG per tablet 60 tablet 3     Sig: TAKE 1 TABLET BY MOUTH TWICE A DAY         Please approve or refuse this medication.    Tate Goff MA

## 2021-11-15 RX ORDER — ROSUVASTATIN CALCIUM 10 MG/1
TABLET, COATED ORAL
Qty: 90 TABLET | Refills: 3 | Status: SHIPPED | OUTPATIENT
Start: 2021-11-15 | End: 2022-11-04 | Stop reason: SDUPTHER

## 2021-12-07 DIAGNOSIS — K58.9 IRRITABLE BOWEL SYNDROME, UNSPECIFIED TYPE: ICD-10-CM

## 2021-12-07 RX ORDER — PHENOBARBITAL 16.2 MG/1
TABLET ORAL
Qty: 90 TABLET | Refills: 0 | Status: SHIPPED | OUTPATIENT
Start: 2021-12-07 | End: 2022-02-18 | Stop reason: SDUPTHER

## 2022-01-03 RX ORDER — BUPROPION HYDROCHLORIDE 150 MG/1
TABLET, EXTENDED RELEASE ORAL
Qty: 90 TABLET | Refills: 3 | Status: SHIPPED | OUTPATIENT
Start: 2022-01-03

## 2022-01-03 NOTE — TELEPHONE ENCOUNTER
[facility] called requesting a refill of the below medication which has been pended for you:     Requested Prescriptions     Pending Prescriptions Disp Refills    buPROPion (WELLBUTRIN SR) 150 MG extended release tablet [Pharmacy Med Name: Stephon Ran 12 SR TAB 150MG(W)] 90 tablet 3     Sig: TAKE 1 TABLET DAILY       Last Appointment Date: 5/11/2021  Next Appointment Date: 2/14/2022    Allergies   Allergen Reactions    Adhesive Tape     Dexamethasone      Severe hiccups placed in hospital for 3 days    Celebrex [Celecoxib] Rash

## 2022-01-13 DIAGNOSIS — K58.0 IRRITABLE BOWEL SYNDROME WITH DIARRHEA: ICD-10-CM

## 2022-01-13 RX ORDER — DIPHENOXYLATE HYDROCHLORIDE AND ATROPINE SULFATE 2.5; .025 MG/1; MG/1
TABLET ORAL
Qty: 60 TABLET | Refills: 2 | Status: SHIPPED | OUTPATIENT
Start: 2022-01-13 | End: 2022-04-29

## 2022-01-13 NOTE — TELEPHONE ENCOUNTER
Nhi Ulloa called to request a refill on his medication. Last office visit : 8/11/2021   Next office visit : 2/14/2022     Last UDS:   Amphetamine Screen, Urine   Date Value Ref Range Status   01/09/2020 neg  Final     Barbiturate Screen, Urine   Date Value Ref Range Status   01/09/2020 pos  Final     Benzodiazepine Screen, Urine   Date Value Ref Range Status   01/09/2020 neg  Final     Buprenorphine Urine   Date Value Ref Range Status   01/09/2020 neg  Final     Cocaine Metabolite Screen, Urine   Date Value Ref Range Status   01/09/2020 neg  Final     Gabapentin Screen, Urine   Date Value Ref Range Status   01/09/2020 neg  Final     MDMA, Urine   Date Value Ref Range Status   01/09/2020 neg  Final     Methamphetamine, Urine   Date Value Ref Range Status   01/09/2020 neg  Final     Opiate Scrn, Ur   Date Value Ref Range Status   01/09/2020 neg  Final     Oxycodone Screen, Ur   Date Value Ref Range Status   01/09/2020 neg  Final     PCP Screen, Urine   Date Value Ref Range Status   01/09/2020 neg  Final     Propoxyphene Screen, Urine   Date Value Ref Range Status   01/09/2020 neg  Final     THC Screen, Urine   Date Value Ref Range Status   01/09/2020 neg  Final     Tricyclic Antidepressants, Urine   Date Value Ref Range Status   01/09/2020 neg  Final       Last Patrecimiller Jacobser: 01/13/22  Medication Contract: NEEDS   Last Fill: 12/15/21    Requested Prescriptions     Pending Prescriptions Disp Refills    diphenoxylate-atropine (LOMOTIL) 2.5-0.025 MG per tablet [Pharmacy Med Name: DIPHENOXYLATE-ATROP 2.5-0.025] 60 tablet 3     Sig: TAKE 1 TABLET BY MOUTH TWICE A DAY         Please approve or refuse this medication.    Alex Massey MA

## 2022-01-16 PROCEDURE — U0004 COV-19 TEST NON-CDC HGH THRU: HCPCS | Performed by: NURSE PRACTITIONER

## 2022-02-18 ENCOUNTER — OFFICE VISIT (OUTPATIENT)
Dept: FAMILY MEDICINE CLINIC | Age: 67
End: 2022-02-18
Payer: COMMERCIAL

## 2022-02-18 VITALS
OXYGEN SATURATION: 98 % | HEIGHT: 70 IN | DIASTOLIC BLOOD PRESSURE: 72 MMHG | SYSTOLIC BLOOD PRESSURE: 122 MMHG | TEMPERATURE: 97.2 F | HEART RATE: 70 BPM | WEIGHT: 177 LBS | BODY MASS INDEX: 25.34 KG/M2

## 2022-02-18 DIAGNOSIS — K40.91 UNILATERAL RECURRENT INGUINAL HERNIA WITHOUT OBSTRUCTION OR GANGRENE: Primary | ICD-10-CM

## 2022-02-18 DIAGNOSIS — E78.01 FAMILIAL HYPERCHOLESTEROLEMIA: ICD-10-CM

## 2022-02-18 DIAGNOSIS — K58.9 IRRITABLE BOWEL SYNDROME, UNSPECIFIED TYPE: ICD-10-CM

## 2022-02-18 DIAGNOSIS — Z12.5 ENCOUNTER FOR PROSTATE CANCER SCREENING: ICD-10-CM

## 2022-02-18 DIAGNOSIS — I10 ESSENTIAL (PRIMARY) HYPERTENSION: ICD-10-CM

## 2022-02-18 PROCEDURE — 99214 OFFICE O/P EST MOD 30 MIN: CPT | Performed by: FAMILY MEDICINE

## 2022-02-18 RX ORDER — PHENOBARBITAL 16.2 MG/1
TABLET ORAL
Qty: 90 TABLET | Refills: 0 | Status: SHIPPED | OUTPATIENT
Start: 2022-02-18 | End: 2022-05-31 | Stop reason: SDUPTHER

## 2022-02-18 NOTE — PROGRESS NOTES
[facility] PHYSICIAN SERVICES  [facility] FAMILY MEDICINE  [id number] [place] [address] [id number]  [address] [address]  Dept: [phone]  Dept Fax: [phone]  Loc: [phone]     Bronson Bunch is a 78 y.o. male who presents today for his medical conditions/complaintsas noted below. Bronson Bunch is c/o of 6 Month Follow-Up and Hernia        HPI:   Patient is here for follow up. He has right inguinal hernia, has pain described as sharp intermittent pain about 3 x per week. No bulge that is persistent or hardened. Hypertension  Compliant with medications. No adverse effects from medication. No lightheadedness, palpitations, or chest pain. IBS controlled on current meds.      Lab Results   Component Value Date     05/19/2021    K 3.3 (L) 05/19/2021     05/19/2021    CO2 29 05/19/2021    BUN 12 05/19/2021    CREATININE 0.9 05/19/2021    GLUCOSE 82 05/19/2021    CALCIUM 9.1 05/19/2021    PROT 6.5 (L) 05/19/2021    LABALBU 3.8 05/19/2021    BILITOT 0.4 05/19/2021    ALKPHOS 81 05/19/2021    AST 14 05/19/2021    ALT 10 05/19/2021    LABGLOM >60 05/19/2021    GFRAA >59 05/19/2021       Lab Results   Component Value Date    CHOL 122 (L) 05/19/2021    CHOL 138 (L) 02/11/2020    CHOL 144 (L) 02/25/2019     Lab Results   Component Value Date    TRIG 150 (H) 05/19/2021    TRIG 126 02/11/2020    TRIG 163 (H) 02/25/2019     Lab Results   Component Value Date    HDL 38 (L) 05/19/2021    HDL 47 (L) 02/11/2020    HDL 43 (L) 02/25/2019     Lab Results   Component Value Date    LDLCALC 54 05/19/2021 [address] 66 02/11/2020 [address] 68 02/25/2019     No results found for: LABVLDL, VLDL  No results found for: CHOLHDLRATIO      HPI    Past Medical History:   Diagnosis Date    GERD (gastroesophageal reflux disease)     Hyperlipidemia     Hypertension     IBS (irritable bowel syndrome)      Past Surgical History:   Procedure Laterality Date    APPENDECTOMY  1974   Selam Mata  2012    Dr. Nory Moreno CYST REMOVAL  1990    neck    RECTAL SURGERY  10/2011    Exc Inclusion Cyst Rt Buttock, Perianal Area, Fistulotomy, Ext Hemorrhoids    SKIN CANCER EXCISION  08/2011    back of neck - precancerous    UPPER GASTROINTESTINAL ENDOSCOPY  2010    Dr. Tanvir Morales- Esophagous stretched.     UPPER GASTROINTESTINAL ENDOSCOPY N/A 06/03/2021    Dr Clara [id number] appearing stricture noted in the distal esophagus, gastritis    UPPER GASTROINTESTINAL ENDOSCOPY N/A 06/03/2021    Dr Clara [id number] appearing stricture noted in the distal esophagus, gastritis       Family History   Problem Relation Age of Onset    High Blood Pressure Mother     Alzheimer's Disease Mother     High Blood Pressure Father     Cancer Father         lung    Colon Cancer Neg Hx     Esophageal Cancer Neg Hx     Liver Cancer Neg Hx     Rectal Cancer Neg Hx     Stomach Cancer Neg Hx        Social History     Tobacco Use    Smoking status: Light Tobacco Smoker     Packs/day: 0.50     Years: 30.00     Pack years: 15.00    Smokeless tobacco: Never Used   Substance Use Topics    Alcohol use: Not Currently     Comment: rarely      Current Outpatient Medications   Medication Sig Dispense Refill    PHENobarbital (LUMINAL) 16.2 MG tablet TAKE 1 TABLET BY MOUTH EVERY DAY 90 tablet 0    diphenoxylate-atropine (LOMOTIL) 2.5-0.025 MG per tablet TAKE 1 TABLET BY MOUTH TWICE A DAY 60 tablet 2    buPROPion (WELLBUTRIN SR) 150 MG extended release tablet TAKE 1 TABLET DAILY 90 tablet 3    losartan-hydroCHLOROthiazide (HYZAAR) 100-12.5 MG per tablet TAKE 1 TABLET DAILY 90 tablet 0    rosuvastatin (CRESTOR) 10 MG tablet TAKE 1 TABLET DAILY 90 tablet 3    hyoscyamine (ANASPAZ;LEVSIN) 125 MCG tablet TAKE 1 TABLET BY MOUTH EVERY DAY 90 tablet 3    triamcinolone (ARISTOCORT) 0.5 % cream APPLY TO AFFECTED AREA 3 TIMES A DAY 30 g 3    metoprolol succinate (TOPROL XL) 50 MG extended release tablet TAKE 1 TABLET DAILY 90 tablet 3    esomeprazole ([employer]) 40 MG delayed release capsule Take 1 capsule by mouth every morning (before breakfast) (Patient taking differently: Take 20 mg by mouth every morning (before breakfast) OTC) 30 capsule 5    clotrimazole-betamethasone (LOTRISONE) 1-0.05 % cream APPLY TO AFFECTED AREA  2 TO 3 TIMES PER DAY 1 Tube 3    Ascorbic Acid (VITAMIN C) 500 MG tablet Take 500 mg by mouth daily.  vitamin B-12 (CYANOCOBALAMIN) 100 MCG tablet Take 50 mcg by mouth daily.  aspirin 81 MG EC tablet Take 81 mg by mouth daily. No current facility-administered medications for this visit. Allergies   Allergen Reactions    Adhesive Tape     Dexamethasone      Severe hiccups placed in hospital for 3 days    Celebrex [Celecoxib] Rash       Health Maintenance   Topic Date Due    Hepatitis C screen  Never done    Pneumococcal 65+ years Vaccine (1 of 1 - PPSV23) Never done    AAA screen  01/31/2021    COVID-19 Vaccine (2 - Booster for [employer] series) 05/11/2021    Flu vaccine (1) 02/18/2023 (Originally 9/1/2021)    Depression Screen  05/11/2022    Lipid screen  05/19/2022    Potassium monitoring  05/19/2022    Creatinine monitoring  05/19/2022    Colorectal Cancer Screen  05/27/2024    DTaP/Tdap/Td vaccine (2 - Td or Tdap) 03/30/2025    Shingles Vaccine  Completed    Hepatitis A vaccine  Aged Out    Hepatitis B vaccine  Aged Out    Hib vaccine  Aged Out    Meningococcal (ACWY) vaccine  Aged Out       Subjective:     Review of Systems   Constitutional: Negative for chills and fever. HENT: Negative for congestion. Respiratory: Negative for cough, chest tightness and shortness of breath. Cardiovascular: Negative for chest pain, palpitations and leg swelling. Gastrointestinal: Negative for abdominal pain, anal bleeding, constipation, diarrhea and nausea. Genitourinary: Negative for difficulty urinating. Psychiatric/Behavioral: Negative. See HPI for visit specific review of symptoms.   All others negative      Objective:   /72   Pulse 70   Temp 97.2 °F (36.2 °C)   Ht 5' 10\" (1.778 m)   Wt 177 lb (80.3 kg)   SpO2 98%   BMI 25.40 kg/m²    Physical Exam  Constitutional:       Appearance: He is well-developed. He is not ill-appearing. Eyes:      Pupils: Pupils are equal, round, and reactive to light. Cardiovascular:      Rate and Rhythm: Normal rate and regular rhythm. Heart sounds: No murmur heard. No friction rub. Pulmonary:      Effort: Pulmonary effort is normal. No respiratory distress. Breath sounds: Normal breath sounds. No wheezing or rales. Abdominal:      General: Bowel sounds are normal. There is no distension. Palpations: Abdomen is soft. Tenderness: There is no abdominal tenderness. There is no guarding or rebound. Musculoskeletal:         General: Tenderness (right inguinal region) present. Cervical back: Normal range of motion and neck supple. Neurological:      Mental Status: He is alert. Psychiatric:         Behavior: Behavior normal.           Lab Review   Recent Results (from the past 672 hour(s))   POCT Rapid Drug Screen    Collection Time: 02/21/22 12:00 AM   Result Value Ref Range    Alcohol, Urine      Amphetamine Screen, Urine neg     Barbiturate Screen, Urine pos     Benzodiazepine Screen, Urine neg     Buprenorphine Urine neg     Cocaine Metabolite Screen, Urine neg     FENTANYL SCREEN, URINE neg     Gabapentin Screen, Urine neg     MDMA, Urine neg     Methadone Screen, Urine neg     Methamphetamine, Urine neg     Opiate Scrn, Ur neg     Oxycodone Screen, Ur neg     PCP Screen, Urine neg     Propoxyphene Screen, Urine neg     Synthetic Cannabinoids (K2) Screen, Urine neg     THC Screen, Urine neg     Tramadol Scrn, Ur neg     Tricyclic Antidepressants, Urine neg                Assessment & Plan:          The following diagnoses and conditions are stable withno further orders unless indicated:  Agustina Frankel was seen today for 6 month follow-up and hernia. Diagnoses and all orders for this visit:    Unilateral recurrent inguinal hernia without obstruction or gangrene  -     External Referral To General Surgery  Refer to Surgery for evaluation for repair. Familial hypercholesterolemia  -     Comprehensive Metabolic Panel; Future  -     Lipid Panel; Future  Will check lipids. Encouraged healthy diet, regular exercise. Encounter for prostate cancer screening  -     PSA Screening; Future    Irritable bowel syndrome, unspecified type  -     PHENobarbital (LUMINAL) 16.2 MG tablet; TAKE 1 TABLET BY MOUTH EVERY DAY  Continue current tx. Avoid food triggers. Essential (primary) hypertension  Blood pressure is stable. Continue current medications. Monitor ambulatory bp readings, if persistently >140/90, return to clinic. Return in about 3 months (around 5/18/2022) for Physical.            Discussed use, benefit, and side effects of prescribed medications. All patient questions answered. Pt voiced understanding. Reviewed health maintenance. Instructed to continue current medications, diet and exercise. Patient agreedwith treatment plan. Follow up as directed.

## 2022-02-21 ENCOUNTER — NURSE ONLY (OUTPATIENT)
Dept: FAMILY MEDICINE CLINIC | Age: 67
End: 2022-02-21
Payer: COMMERCIAL

## 2022-02-21 DIAGNOSIS — Z79.899 HIGH RISK MEDICATION USE: Primary | ICD-10-CM

## 2022-02-21 PROCEDURE — 80305 DRUG TEST PRSMV DIR OPT OBS: CPT | Performed by: FAMILY MEDICINE

## 2022-02-27 ASSESSMENT — ENCOUNTER SYMPTOMS
ANAL BLEEDING: 0
CONSTIPATION: 0
CHEST TIGHTNESS: 0
SHORTNESS OF BREATH: 0
COUGH: 0
ABDOMINAL PAIN: 0
NAUSEA: 0
DIARRHEA: 0

## 2022-02-28 RX ORDER — METOPROLOL SUCCINATE 50 MG/1
TABLET, EXTENDED RELEASE ORAL
Qty: 90 TABLET | Refills: 3 | Status: SHIPPED | OUTPATIENT
Start: 2022-02-28

## 2022-03-08 DIAGNOSIS — K58.9 IRRITABLE BOWEL SYNDROME, UNSPECIFIED TYPE: ICD-10-CM

## 2022-03-08 RX ORDER — PHENOBARBITAL 16.2 MG/1
TABLET ORAL
Qty: 90 TABLET | Refills: 0 | OUTPATIENT
Start: 2022-03-08 | End: 2022-06-06

## 2022-03-11 ENCOUNTER — TRANSCRIBE ORDERS (OUTPATIENT)
Dept: LAB | Facility: HOSPITAL | Age: 67
End: 2022-03-11

## 2022-03-11 DIAGNOSIS — Z11.59 SCREENING FOR VIRAL DISEASE: Primary | ICD-10-CM

## 2022-03-14 ENCOUNTER — LAB (OUTPATIENT)
Dept: LAB | Facility: HOSPITAL | Age: 67
End: 2022-03-14

## 2022-03-14 ENCOUNTER — HOSPITAL ENCOUNTER (OUTPATIENT)
Dept: GENERAL RADIOLOGY | Facility: HOSPITAL | Age: 67
Discharge: HOME | End: 2022-03-14

## 2022-03-14 ENCOUNTER — PRE-ADMISSION TESTING (OUTPATIENT)
Dept: PREADMISSION TESTING | Facility: HOSPITAL | Age: 67
End: 2022-03-14

## 2022-03-14 VITALS
HEIGHT: 69 IN | OXYGEN SATURATION: 100 % | RESPIRATION RATE: 16 BRPM | BODY MASS INDEX: 25.57 KG/M2 | HEART RATE: 58 BPM | WEIGHT: 172.62 LBS | DIASTOLIC BLOOD PRESSURE: 78 MMHG | SYSTOLIC BLOOD PRESSURE: 125 MMHG

## 2022-03-14 LAB
ALBUMIN SERPL-MCNC: 4.3 G/DL (ref 3.5–5.2)
ALBUMIN/GLOB SERPL: 1.7 G/DL
ALP SERPL-CCNC: 81 U/L (ref 39–117)
ALT SERPL W P-5'-P-CCNC: 12 U/L (ref 1–41)
ANION GAP SERPL CALCULATED.3IONS-SCNC: 9 MMOL/L (ref 5–15)
AST SERPL-CCNC: 14 U/L (ref 1–40)
BASOPHILS # BLD AUTO: 0.04 10*3/MM3 (ref 0–0.2)
BASOPHILS NFR BLD AUTO: 0.5 % (ref 0–1.5)
BILIRUB SERPL-MCNC: 0.4 MG/DL (ref 0–1.2)
BUN SERPL-MCNC: 11 MG/DL (ref 8–23)
BUN/CREAT SERPL: 12.1 (ref 7–25)
CALCIUM SPEC-SCNC: 9.7 MG/DL (ref 8.6–10.5)
CHLORIDE SERPL-SCNC: 102 MMOL/L (ref 98–107)
CO2 SERPL-SCNC: 31 MMOL/L (ref 22–29)
CREAT SERPL-MCNC: 0.91 MG/DL (ref 0.76–1.27)
DEPRECATED RDW RBC AUTO: 39.4 FL (ref 37–54)
EGFRCR SERPLBLD CKD-EPI 2021: 93 ML/MIN/1.73
EOSINOPHIL # BLD AUTO: 0.19 10*3/MM3 (ref 0–0.4)
EOSINOPHIL NFR BLD AUTO: 2.5 % (ref 0.3–6.2)
ERYTHROCYTE [DISTWIDTH] IN BLOOD BY AUTOMATED COUNT: 12.1 % (ref 12.3–15.4)
GLOBULIN UR ELPH-MCNC: 2.5 GM/DL
GLUCOSE SERPL-MCNC: 86 MG/DL (ref 65–99)
HCT VFR BLD AUTO: 41.1 % (ref 37.5–51)
HGB BLD-MCNC: 14.4 G/DL (ref 13–17.7)
IMM GRANULOCYTES # BLD AUTO: 0.03 10*3/MM3 (ref 0–0.05)
IMM GRANULOCYTES NFR BLD AUTO: 0.4 % (ref 0–0.5)
LYMPHOCYTES # BLD AUTO: 1.85 10*3/MM3 (ref 0.7–3.1)
LYMPHOCYTES NFR BLD AUTO: 23.9 % (ref 19.6–45.3)
MCH RBC QN AUTO: 31.1 PG (ref 26.6–33)
MCHC RBC AUTO-ENTMCNC: 35 G/DL (ref 31.5–35.7)
MCV RBC AUTO: 88.8 FL (ref 79–97)
MONOCYTES # BLD AUTO: 0.41 10*3/MM3 (ref 0.1–0.9)
MONOCYTES NFR BLD AUTO: 5.3 % (ref 5–12)
NEUTROPHILS NFR BLD AUTO: 5.22 10*3/MM3 (ref 1.7–7)
NEUTROPHILS NFR BLD AUTO: 67.4 % (ref 42.7–76)
NRBC BLD AUTO-RTO: 0 /100 WBC (ref 0–0.2)
PLATELET # BLD AUTO: 248 10*3/MM3 (ref 140–450)
PMV BLD AUTO: 10.4 FL (ref 6–12)
POTASSIUM SERPL-SCNC: 4.3 MMOL/L (ref 3.5–5.2)
PROT SERPL-MCNC: 6.8 G/DL (ref 6–8.5)
RBC # BLD AUTO: 4.63 10*6/MM3 (ref 4.14–5.8)
SODIUM SERPL-SCNC: 142 MMOL/L (ref 136–145)
WBC NRBC COR # BLD: 7.74 10*3/MM3 (ref 3.4–10.8)

## 2022-03-14 PROCEDURE — C9803 HOPD COVID-19 SPEC COLLECT: HCPCS | Performed by: SPECIALIST

## 2022-03-14 PROCEDURE — 93010 ELECTROCARDIOGRAM REPORT: CPT | Performed by: INTERNAL MEDICINE

## 2022-03-14 PROCEDURE — 36415 COLL VENOUS BLD VENIPUNCTURE: CPT

## 2022-03-14 PROCEDURE — 71045 X-RAY EXAM CHEST 1 VIEW: CPT

## 2022-03-14 PROCEDURE — 80053 COMPREHEN METABOLIC PANEL: CPT

## 2022-03-14 PROCEDURE — 93005 ELECTROCARDIOGRAM TRACING: CPT

## 2022-03-14 PROCEDURE — 85025 COMPLETE CBC W/AUTO DIFF WBC: CPT

## 2022-03-14 PROCEDURE — U0004 COV-19 TEST NON-CDC HGH THRU: HCPCS | Performed by: SPECIALIST

## 2022-03-14 RX ORDER — LOSARTAN POTASSIUM AND HYDROCHLOROTHIAZIDE 12.5; 1 MG/1; MG/1
TABLET ORAL
Qty: 90 TABLET | Refills: 0 | Status: SHIPPED | OUTPATIENT
Start: 2022-03-14 | End: 2022-06-06

## 2022-03-14 NOTE — DISCHARGE INSTRUCTIONS
Before you come to the hospital      Do not eat, drink, smoke, or chew gum after midnight the night before surgery. This includes no mints.    Arrival time: AS DIRECTED BY OFFICE     Only one family member or friend will be allowed per patient      YOU MAY TAKE THE FOLLOWING MEDICATION(S) THE MORNING OF SURGERY WITH A SIP OF WATER: ***metoprolol  Hold your losartan for 24 hr before surgery         ALL OTHER HOME MEDICATION CHECK WITH YOUR PHYSICIAN                            (especially if you are taking diabetes medicines or blood thinners)     Do not take any Erectile Dysfunction medications (EX: CIALIS, VIAGRA) 24 hours prior to surgery      If you were given and instructed to use a germ- killing soap, use as directed the night before surgery and the morning of surgery before coming to the hospital.                 MANAGING PAIN AFTER SURGERY    We know you are probably wondering what your pain will be like after surgery.  Following surgery it is unrealistic to expect you will not have pain.   Pain is how our bodies let us know that something is wrong or cautions us to be careful.  That said, our goal is to make your pain tolerable.    Methods we may use to treat your pain include (oral or IV medications, PCAs, epidurals, nerve blocks, etc.)   While some procedures require IV pain medications for a short time after surgery, transitioning to pain medications by mouth allows for better management of pain.   Your nurse will encourage you to take oral pain medications whenever possible.  IV medications work almost immediately, but only last a short while.  Taking medications by mouth allows for a more constant level of medication in your blood stream for a longer period of time.      Once your pain is out of control it is harder to get back under control.  It is important you are aware when your next dose of pain medication is due.  If you are admitted, your nurse may write the time of your next dose on the white  board in your room to help you remember.      We are interested in your pain and encourage you to inform us about aggravating factors during your visit.   Many times a simple repositioning every few hours can make a big difference.    If your physician says it is okay, do not let your pain prevent you from getting out of bed. Be sure to call your nurse for assistance prior to getting up so you do not fall.      Before surgery, please decide your tolerable pain goal.  These faces help describe the pain ratings we use on a 0-10 scale.   Be prepared to tell us your goal and whether or not you take pain or anxiety medications at home.

## 2022-03-15 LAB — SARS-COV-2 ORF1AB RESP QL NAA+PROBE: NOT DETECTED

## 2022-03-16 ENCOUNTER — ANESTHESIA EVENT (OUTPATIENT)
Dept: PERIOP | Facility: HOSPITAL | Age: 67
End: 2022-03-16

## 2022-03-16 ENCOUNTER — HOSPITAL ENCOUNTER (OUTPATIENT)
Facility: HOSPITAL | Age: 67
Setting detail: HOSPITAL OUTPATIENT SURGERY
Discharge: HOME | End: 2022-03-16
Attending: SPECIALIST | Admitting: SPECIALIST

## 2022-03-16 ENCOUNTER — ANESTHESIA (OUTPATIENT)
Dept: PERIOP | Facility: HOSPITAL | Age: 67
End: 2022-03-16

## 2022-03-16 VITALS
OXYGEN SATURATION: 93 % | HEART RATE: 50 BPM | DIASTOLIC BLOOD PRESSURE: 71 MMHG | RESPIRATION RATE: 16 BRPM | SYSTOLIC BLOOD PRESSURE: 125 MMHG | TEMPERATURE: 97.8 F

## 2022-03-16 DIAGNOSIS — K40.90 DIRECT INGUINAL HERNIA OF RIGHT SIDE: Primary | ICD-10-CM

## 2022-03-16 LAB
GLUCOSE BLDC GLUCOMTR-MCNC: 99 MG/DL (ref 70–130)
QT INTERVAL: 434 MS
QTC INTERVAL: 415 MS

## 2022-03-16 PROCEDURE — 25010000002 HYDROMORPHONE PER 4 MG: Performed by: ANESTHESIOLOGY

## 2022-03-16 PROCEDURE — 25010000002 FENTANYL CITRATE (PF) 50 MCG/ML SOLUTION: Performed by: ANESTHESIOLOGY

## 2022-03-16 PROCEDURE — 25010000002 PROPOFOL 10 MG/ML EMULSION: Performed by: NURSE ANESTHETIST, CERTIFIED REGISTERED

## 2022-03-16 PROCEDURE — C1781 MESH (IMPLANTABLE): HCPCS | Performed by: SPECIALIST

## 2022-03-16 PROCEDURE — 25010000002 VANCOMYCIN 1 G RECONSTITUTED SOLUTION 1 EACH VIAL: Performed by: SPECIALIST

## 2022-03-16 PROCEDURE — 82962 GLUCOSE BLOOD TEST: CPT

## 2022-03-16 PROCEDURE — 25010000002 FENTANYL CITRATE (PF) 100 MCG/2ML SOLUTION: Performed by: NURSE ANESTHETIST, CERTIFIED REGISTERED

## 2022-03-16 DEVICE — BARD MESH
Type: IMPLANTABLE DEVICE | Site: INGUINAL | Status: FUNCTIONAL
Brand: BARD MESH

## 2022-03-16 RX ORDER — DROPERIDOL 2.5 MG/ML
0.62 INJECTION, SOLUTION INTRAMUSCULAR; INTRAVENOUS ONCE AS NEEDED
Status: DISCONTINUED | OUTPATIENT
Start: 2022-03-16 | End: 2022-03-16 | Stop reason: HOSPADM

## 2022-03-16 RX ORDER — ONDANSETRON HYDROCHLORIDE 8 MG/1
4 TABLET, FILM COATED ORAL EVERY 8 HOURS PRN
Qty: 10 TABLET | Refills: 1 | Status: SHIPPED | OUTPATIENT
Start: 2022-03-16

## 2022-03-16 RX ORDER — OXYCODONE AND ACETAMINOPHEN 10; 325 MG/1; MG/1
1 TABLET ORAL ONCE AS NEEDED
Status: COMPLETED | OUTPATIENT
Start: 2022-03-16 | End: 2022-03-16

## 2022-03-16 RX ORDER — HYDROMORPHONE HYDROCHLORIDE 1 MG/ML
0.5 INJECTION, SOLUTION INTRAMUSCULAR; INTRAVENOUS; SUBCUTANEOUS
Status: DISCONTINUED | OUTPATIENT
Start: 2022-03-16 | End: 2022-03-16 | Stop reason: HOSPADM

## 2022-03-16 RX ORDER — ONDANSETRON 2 MG/ML
4 INJECTION INTRAMUSCULAR; INTRAVENOUS AS NEEDED
Status: DISCONTINUED | OUTPATIENT
Start: 2022-03-16 | End: 2022-03-16 | Stop reason: HOSPADM

## 2022-03-16 RX ORDER — BUPIVACAINE HYDROCHLORIDE AND EPINEPHRINE 5; 5 MG/ML; UG/ML
INJECTION, SOLUTION PERINEURAL AS NEEDED
Status: DISCONTINUED | OUTPATIENT
Start: 2022-03-16 | End: 2022-03-16 | Stop reason: HOSPADM

## 2022-03-16 RX ORDER — DEXTROSE MONOHYDRATE 25 G/50ML
12.5 INJECTION, SOLUTION INTRAVENOUS AS NEEDED
Status: DISCONTINUED | OUTPATIENT
Start: 2022-03-16 | End: 2022-03-16 | Stop reason: HOSPADM

## 2022-03-16 RX ORDER — FENTANYL CITRATE 50 UG/ML
25 INJECTION, SOLUTION INTRAMUSCULAR; INTRAVENOUS
Status: DISCONTINUED | OUTPATIENT
Start: 2022-03-16 | End: 2022-03-16 | Stop reason: HOSPADM

## 2022-03-16 RX ORDER — NEOSTIGMINE METHYLSULFATE 5 MG/5 ML
SYRINGE (ML) INTRAVENOUS AS NEEDED
Status: DISCONTINUED | OUTPATIENT
Start: 2022-03-16 | End: 2022-03-16 | Stop reason: SURG

## 2022-03-16 RX ORDER — ACETAMINOPHEN 500 MG
1000 TABLET ORAL ONCE
Status: COMPLETED | OUTPATIENT
Start: 2022-03-16 | End: 2022-03-16

## 2022-03-16 RX ORDER — LIDOCAINE HYDROCHLORIDE 10 MG/ML
0.5 INJECTION, SOLUTION EPIDURAL; INFILTRATION; INTRACAUDAL; PERINEURAL ONCE AS NEEDED
Status: DISCONTINUED | OUTPATIENT
Start: 2022-03-16 | End: 2022-03-16 | Stop reason: HOSPADM

## 2022-03-16 RX ORDER — MAGNESIUM HYDROXIDE 1200 MG/15ML
LIQUID ORAL AS NEEDED
Status: DISCONTINUED | OUTPATIENT
Start: 2022-03-16 | End: 2022-03-16 | Stop reason: HOSPADM

## 2022-03-16 RX ORDER — PROPOFOL 10 MG/ML
VIAL (ML) INTRAVENOUS AS NEEDED
Status: DISCONTINUED | OUTPATIENT
Start: 2022-03-16 | End: 2022-03-16 | Stop reason: SURG

## 2022-03-16 RX ORDER — SODIUM CHLORIDE, SODIUM LACTATE, POTASSIUM CHLORIDE, CALCIUM CHLORIDE 600; 310; 30; 20 MG/100ML; MG/100ML; MG/100ML; MG/100ML
1000 INJECTION, SOLUTION INTRAVENOUS CONTINUOUS
Status: DISCONTINUED | OUTPATIENT
Start: 2022-03-16 | End: 2022-03-16 | Stop reason: HOSPADM

## 2022-03-16 RX ORDER — NALOXONE HCL 0.4 MG/ML
0.04 VIAL (ML) INJECTION AS NEEDED
Status: DISCONTINUED | OUTPATIENT
Start: 2022-03-16 | End: 2022-03-16 | Stop reason: HOSPADM

## 2022-03-16 RX ORDER — SODIUM CHLORIDE 0.9 % (FLUSH) 0.9 %
3 SYRINGE (ML) INJECTION AS NEEDED
Status: DISCONTINUED | OUTPATIENT
Start: 2022-03-16 | End: 2022-03-16 | Stop reason: HOSPADM

## 2022-03-16 RX ORDER — ROCURONIUM BROMIDE 10 MG/ML
INJECTION, SOLUTION INTRAVENOUS AS NEEDED
Status: DISCONTINUED | OUTPATIENT
Start: 2022-03-16 | End: 2022-03-16 | Stop reason: SURG

## 2022-03-16 RX ORDER — LIDOCAINE HYDROCHLORIDE 20 MG/ML
INJECTION, SOLUTION EPIDURAL; INFILTRATION; INTRACAUDAL; PERINEURAL AS NEEDED
Status: DISCONTINUED | OUTPATIENT
Start: 2022-03-16 | End: 2022-03-16 | Stop reason: SURG

## 2022-03-16 RX ORDER — LABETALOL HYDROCHLORIDE 5 MG/ML
5 INJECTION, SOLUTION INTRAVENOUS
Status: DISCONTINUED | OUTPATIENT
Start: 2022-03-16 | End: 2022-03-16 | Stop reason: HOSPADM

## 2022-03-16 RX ORDER — OXYCODONE HYDROCHLORIDE AND ACETAMINOPHEN 5; 325 MG/1; MG/1
1 TABLET ORAL EVERY 4 HOURS PRN
Qty: 40 TABLET | Refills: 0 | Status: SHIPPED | OUTPATIENT
Start: 2022-03-16 | End: 2022-08-25

## 2022-03-16 RX ORDER — FLUMAZENIL 0.1 MG/ML
0.2 INJECTION INTRAVENOUS AS NEEDED
Status: DISCONTINUED | OUTPATIENT
Start: 2022-03-16 | End: 2022-03-16 | Stop reason: HOSPADM

## 2022-03-16 RX ORDER — FENTANYL CITRATE 50 UG/ML
INJECTION, SOLUTION INTRAMUSCULAR; INTRAVENOUS AS NEEDED
Status: DISCONTINUED | OUTPATIENT
Start: 2022-03-16 | End: 2022-03-16 | Stop reason: SURG

## 2022-03-16 RX ORDER — SCOLOPAMINE TRANSDERMAL SYSTEM 1 MG/1
1 PATCH, EXTENDED RELEASE TRANSDERMAL CONTINUOUS
Status: DISCONTINUED | OUTPATIENT
Start: 2022-03-16 | End: 2022-03-16 | Stop reason: HOSPADM

## 2022-03-16 RX ORDER — MIDAZOLAM HYDROCHLORIDE 1 MG/ML
0.5 INJECTION INTRAMUSCULAR; INTRAVENOUS
Status: DISCONTINUED | OUTPATIENT
Start: 2022-03-16 | End: 2022-03-16 | Stop reason: HOSPADM

## 2022-03-16 RX ORDER — KETAMINE HCL IN NACL, ISO-OSM 100MG/10ML
SYRINGE (ML) INJECTION AS NEEDED
Status: DISCONTINUED | OUTPATIENT
Start: 2022-03-16 | End: 2022-03-16 | Stop reason: SURG

## 2022-03-16 RX ORDER — SODIUM CHLORIDE 0.9 % (FLUSH) 0.9 %
10 SYRINGE (ML) INJECTION AS NEEDED
Status: DISCONTINUED | OUTPATIENT
Start: 2022-03-16 | End: 2022-03-16 | Stop reason: HOSPADM

## 2022-03-16 RX ORDER — IBUPROFEN 600 MG/1
600 TABLET ORAL ONCE AS NEEDED
Status: COMPLETED | OUTPATIENT
Start: 2022-03-16 | End: 2022-03-16

## 2022-03-16 RX ORDER — SODIUM CHLORIDE 0.9 % (FLUSH) 0.9 %
10 SYRINGE (ML) INJECTION EVERY 12 HOURS SCHEDULED
Status: DISCONTINUED | OUTPATIENT
Start: 2022-03-16 | End: 2022-03-16 | Stop reason: HOSPADM

## 2022-03-16 RX ORDER — SODIUM CHLORIDE, SODIUM LACTATE, POTASSIUM CHLORIDE, CALCIUM CHLORIDE 600; 310; 30; 20 MG/100ML; MG/100ML; MG/100ML; MG/100ML
9 INJECTION, SOLUTION INTRAVENOUS CONTINUOUS
Status: DISCONTINUED | OUTPATIENT
Start: 2022-03-16 | End: 2022-03-16 | Stop reason: HOSPADM

## 2022-03-16 RX ADMIN — VANCOMYCIN HYDROCHLORIDE 1000 MG: 1 INJECTION, POWDER, LYOPHILIZED, FOR SOLUTION INTRAVENOUS at 12:20

## 2022-03-16 RX ADMIN — PROPOFOL 100 MG: 10 INJECTION, EMULSION INTRAVENOUS at 12:58

## 2022-03-16 RX ADMIN — FENTANYL CITRATE 25 MCG: 50 INJECTION INTRAMUSCULAR; INTRAVENOUS at 15:15

## 2022-03-16 RX ADMIN — LIDOCAINE HYDROCHLORIDE 100 MG: 20 INJECTION, SOLUTION EPIDURAL; INFILTRATION; INTRACAUDAL; PERINEURAL at 12:58

## 2022-03-16 RX ADMIN — OXYCODONE AND ACETAMINOPHEN 1 TABLET: 325; 10 TABLET ORAL at 16:02

## 2022-03-16 RX ADMIN — Medication 30 MG: at 13:15

## 2022-03-16 RX ADMIN — FENTANYL CITRATE 25 MCG: 50 INJECTION INTRAMUSCULAR; INTRAVENOUS at 15:10

## 2022-03-16 RX ADMIN — Medication 3 MG: at 14:33

## 2022-03-16 RX ADMIN — PROPOFOL 40 MG: 10 INJECTION, EMULSION INTRAVENOUS at 13:00

## 2022-03-16 RX ADMIN — SODIUM CHLORIDE, POTASSIUM CHLORIDE, SODIUM LACTATE AND CALCIUM CHLORIDE: 600; 310; 30; 20 INJECTION, SOLUTION INTRAVENOUS at 14:16

## 2022-03-16 RX ADMIN — HYDROMORPHONE HYDROCHLORIDE 0.5 MG: 1 INJECTION, SOLUTION INTRAMUSCULAR; INTRAVENOUS; SUBCUTANEOUS at 15:06

## 2022-03-16 RX ADMIN — ACETAMINOPHEN 1000 MG: 500 TABLET, FILM COATED ORAL at 11:50

## 2022-03-16 RX ADMIN — SCOLOPAMINE TRANSDERMAL SYSTEM 1 PATCH: 1 PATCH, EXTENDED RELEASE TRANSDERMAL at 11:51

## 2022-03-16 RX ADMIN — IBUPROFEN 600 MG: 600 TABLET ORAL at 16:46

## 2022-03-16 RX ADMIN — HYDROMORPHONE HYDROCHLORIDE 0.5 MG: 1 INJECTION, SOLUTION INTRAMUSCULAR; INTRAVENOUS; SUBCUTANEOUS at 15:32

## 2022-03-16 RX ADMIN — GLYCOPYRROLATE 0.2 MG: 0.2 INJECTION, SOLUTION INTRAMUSCULAR; INTRAVENOUS at 13:33

## 2022-03-16 RX ADMIN — GLYCOPYRROLATE 0.4 MG: 0.2 INJECTION, SOLUTION INTRAMUSCULAR; INTRAVENOUS at 14:33

## 2022-03-16 RX ADMIN — ROCURONIUM BROMIDE 30 MG: 10 INJECTION INTRAVENOUS at 12:59

## 2022-03-16 RX ADMIN — SODIUM CHLORIDE, POTASSIUM CHLORIDE, SODIUM LACTATE AND CALCIUM CHLORIDE 1000 ML: 600; 310; 30; 20 INJECTION, SOLUTION INTRAVENOUS at 11:18

## 2022-03-16 RX ADMIN — FENTANYL CITRATE 25 MCG: 50 INJECTION, SOLUTION INTRAMUSCULAR; INTRAVENOUS at 13:05

## 2022-03-16 RX ADMIN — Medication 20 MG: at 13:18

## 2022-03-16 RX ADMIN — FENTANYL CITRATE 75 MCG: 50 INJECTION, SOLUTION INTRAMUSCULAR; INTRAVENOUS at 12:57

## 2022-03-16 NOTE — ANESTHESIA PROCEDURE NOTES
Airway  Urgency: elective    Date/Time: 3/16/2022 1:04 PM  Airway not difficult    General Information and Staff    Patient location during procedure: OR  CRNA: Rochelle Alfonso CRNA    Indications and Patient Condition  Indications for airway management: airway protection    Preoxygenated: yes  MILS maintained throughout  Mask difficulty assessment: 2 - vent by mask + OA or adjuvant +/- NMBA    Final Airway Details  Final airway type: endotracheal airway      Successful airway: ETT  Cuffed: yes   Successful intubation technique: direct laryngoscopy  Facilitating devices/methods: intubating stylet  Endotracheal tube insertion site: oral  Blade: Cervantes  Blade size: 2  ETT size (mm): 7.5  Cormack-Lehane Classification: grade I - full view of glottis  Placement verified by: chest auscultation and capnometry   Cuff volume (mL): 8  Measured from: lips  ETT/EBT  to lips (cm): 21  Number of attempts at approach: 1  Assessment: lips, teeth, and gum same as pre-op and atraumatic intubation

## 2022-03-16 NOTE — OP NOTE
INGUINAL HERNIA REPAIR  Procedure Note    Cortez aLm  3/16/2022    Pre-op Diagnosis:   OPEN RIGHT INGUINAL HERNIA    Post-op Diagnosis:     Right inguinal hernia indirect in nature    Procedure/CPT® Codes:      Procedure(s):  OPEN RIGHT INGUINAL HERNIA REPAIR WITH MESH    Surgeon(s):  Randall Tidwell MD        Anesthesia: General    Staff:   No specimen    Estimated Blood Loss: 25 cc    Specimens:                No specimen      Drains: No drains    Indications: Mr. Cortez Preston is a 67-year-old gentleman who has a increasingly symptomatic right inguinal hernia.  He is referred by Dr. Van Abrams he has a progressive bulge in his right groin for the past 6 months.  With this bulge noted he has increasing discomfort he desires to have surgical intervention and treatment of the above problem.  He is aware the procedure the risk and benefits and gives his informed consent for surgery.    Findings: Right groin exploration completed with findings of a moderate to large right inguinal hernia direct in nature there was no cord lipoma there was no indirect component repair of the direct inguinal hernia completed using Marlex mesh inner plication in a Reinaldo's ligament type repair.  Blood loss was less than 25 cc.    Complications: No complications blood loss 25 cc    Procedure: Patient was placed in a supine position the surgical suite and after a timeout had been completed a curvilinear incision was made in the lower right abdomen incising through the skin and subcutaneous tissue.  The superficial inferior epigastric vessels were noted and ligated with 3-0 Vicryl suture and further dissection through the subcutaneous space tissue was completed to the Montez's fascia.  Montez's fascia was opened and the external oblique aponeurosis was  with care not to injure the underlying ilioinguinal nerve.  With this accomplished the cord structures were encircled with a Penrose drain and skeletonized to assure no indirect  "hernia component.  . There was no cord lipoma either.  With the cord skeletonized the floor was opened the direct inguinal hernia was then attacked.  Preperitoneal area was noted and opened and with this demonstrated a Gelpi retractor was placed for self exposure.  With the Gelpi retractor in position the transversalis fascia was noted and grasped with Allis clamps antibiotic soaked sponge was placed in the preperitoneal area and a 2 x 4\" piece of Marlex mesh was brought into the field.  It was affixed medially to the pubic symphysis superiorly to the transversalis fascia using interrupted horizontal mattress sutures of 0 Prolene and further affixed using a running horizontal mattress suture of 2-0 Prolene.  With the superior and medial aspect of the mesh in good position the inferior aspect of the mesh was then affixed using simple interrupted sutures of 0 Prolene from the mesh to Reinaldo's ligament and then transitioning to the ilioinguinal ligament.  With all sutures placed they were then tied, correct sponge lap and needle count was obtained removing the sponge in the preperitoneal area and with the sponge count completed the sutures were then tied.  With the floor reconstructed the cord was replaced in its anatomical position as well as the ilioinguinal nerve and the external oblique aponeurosis was closed using a running 2-0 Vicryl suture.  With the external oblique closed the Montez's fascia was reapproximated using simple interrupted sutures of 3-0 Vicryl the deep dermis was reapproximated using simple inverted interrupted sutures of 3-0 Vicryl and the skin was then closed using a running subcuticular suture of 4-0 Vicryl.  The wound was injected with half percent Marcaine with epinephrine a total of 50 mL completed following this Mastisol, Steri-Strips, 4 x 4 and Tegaderm applied.  Direct pressure was held on the incision as the patient was extubated.  And then transferred to the recovery room.    Randall CARVALHO" MD Diann     Date: 3/16/2022  Time: 15:02 CDT    EMR Dragon/Transcription disclaimer: Much of this encounter note is an electronic transcription/translation of spoken language to printed text. The electronic translation of spoken language may permit erroneous, or at times, nonsensical words or phrases to be inadvertently transcribed; although I have reviewed the note for such errors, some may still exist.

## 2022-03-16 NOTE — ANESTHESIA POSTPROCEDURE EVALUATION
Patient: Cortez Lam    Procedure Summary     Date: 03/16/22 Room / Location:  [facility] OR 06 /  [facility] OR    Anesthesia Start: 1255 Anesthesia Stop: 1457    Procedure: OPEN RIGHT INGUINAL HERNIA REPAIR WITH MESH (Right Abdomen) Diagnosis: (OPEN RIGHT INGUINAL HERNIA)    Surgeons: Randall Tidwell MD Provider: Gokul Milligan CRNA    Anesthesia Type: general ASA Status: 2          Anesthesia Type: general    Vitals  Vitals Value Taken Time   BP     Temp     Pulse 59 03/16/22 1457   Resp     SpO2 100 % 03/16/22 1457   Vitals shown include unvalidated device data.        Post Anesthesia Care and Evaluation    Patient location during evaluation: PACU  Patient participation: waiting for patient participation  Level of consciousness: sleepy but conscious  Pain score: 0  Pain management: adequate  Airway patency: patent  Anesthetic complications: No anesthetic complications  PONV Status: none  Cardiovascular status: acceptable  Respiratory status: acceptable, face mask and oral airway  Hydration status: acceptable  Post Neuraxial Block status: Motor and sensory function returned to baseline and No signs or symptoms of PDPH  Comments: Blood pressure 148/92, pulse 55, temperature 97.9 °F (36.6 °C), temperature source Temporal, resp. rate 20, SpO2 100 %.

## 2022-03-16 NOTE — ANESTHESIA PREPROCEDURE EVALUATION
Anesthesia Evaluation     Patient summary reviewed   no history of anesthetic complications:  NPO Solid Status: > 8 hours             Airway   Mallampati: II  TM distance: >3 FB  Neck ROM: full  Dental    (+) upper dentures and partials    Pulmonary    (+) a smoker,   (-) COPD, asthma, sleep apnea  Cardiovascular   Exercise tolerance: good (4-7 METS)    (+) hypertension, hyperlipidemia,   (-) pacemaker, past MI, angina, cardiac stents      Neuro/Psych  (-) seizures, TIA, CVA  GI/Hepatic/Renal/Endo    (+)  GERD,    (-) liver disease, no renal disease, diabetes    Musculoskeletal     Abdominal    Substance History      OB/GYN          Other                        Anesthesia Plan    ASA 2     general     intravenous induction     Anesthetic plan, all risks, benefits, and alternatives have been provided, discussed and informed consent has been obtained with: patient.        CODE STATUS:

## 2022-04-29 ENCOUNTER — TELEPHONE (OUTPATIENT)
Dept: FAMILY MEDICINE CLINIC | Age: 67
End: 2022-04-29

## 2022-04-29 DIAGNOSIS — K58.0 IRRITABLE BOWEL SYNDROME WITH DIARRHEA: ICD-10-CM

## 2022-04-29 RX ORDER — DIPHENOXYLATE HYDROCHLORIDE AND ATROPINE SULFATE 2.5; .025 MG/1; MG/1
TABLET ORAL
Qty: 60 TABLET | Refills: 2 | Status: SHIPPED | OUTPATIENT
Start: 2022-04-29 | End: 2022-06-30 | Stop reason: SDUPTHER

## 2022-04-29 NOTE — TELEPHONE ENCOUNTER
Patient is requesting RX:Lomotil 2.5-0.025 mg  90 day supply  [facility] [address] across from [place]. Pharmacy has sent request and told pt they close at 5:00 today.

## 2022-04-29 NOTE — TELEPHONE ENCOUNTER
Sonja Laws called to request a refill on his medication. Last office visit : 2/18/2022   Next office visit : 6/30/2022     Last UDS:   Amphetamine Screen, Urine   Date Value Ref Range Status   02/21/2022 neg  Final     Barbiturate Screen, Urine   Date Value Ref Range Status   02/21/2022 pos  Final     Benzodiazepine Screen, Urine   Date Value Ref Range Status   02/21/2022 neg  Final     Buprenorphine Urine   Date Value Ref Range Status   02/21/2022 neg  Final     Cocaine Metabolite Screen, Urine   Date Value Ref Range Status   02/21/2022 neg  Final     Gabapentin Screen, Urine   Date Value Ref Range Status   02/21/2022 neg  Final     MDMA, Urine   Date Value Ref Range Status   02/21/2022 neg  Final     Methamphetamine, Urine   Date Value Ref Range Status   02/21/2022 neg  Final     Opiate Scrn, Ur   Date Value Ref Range Status   02/21/2022 neg  Final     Oxycodone Screen, Ur   Date Value Ref Range Status   02/21/2022 neg  Final     PCP Screen, Urine   Date Value Ref Range Status   02/21/2022 neg  Final     Propoxyphene Screen, Urine   Date Value Ref Range Status   02/21/2022 neg  Final     THC Screen, Urine   Date Value Ref Range Status   02/21/2022 neg  Final     Tricyclic Antidepressants, Urine   Date Value Ref Range Status   02/21/2022 neg  Final       Last Martin Jewell: 4/29/22  Medication Contract: needs   Last Fill: 3/13/22    Requested Prescriptions     Pending Prescriptions Disp Refills    diphenoxylate-atropine (LOMOTIL) 2.5-0.025 MG per tablet [Pharmacy Med Name: DIPHENOXYLATE-ATROP 2.5-0.025] 60 tablet      Sig: TAKE 1 TABLET BY MOUTH TWICE A DAY         Please approve or refuse this medication.    Tawnya Moctezuma MA

## 2022-05-31 DIAGNOSIS — K58.9 IRRITABLE BOWEL SYNDROME, UNSPECIFIED TYPE: ICD-10-CM

## 2022-05-31 RX ORDER — PHENOBARBITAL 16.2 MG/1
TABLET ORAL
Qty: 90 TABLET | Refills: 0 | Status: SHIPPED | OUTPATIENT
Start: 2022-05-31 | End: 2022-09-02

## 2022-06-06 RX ORDER — LOSARTAN POTASSIUM AND HYDROCHLOROTHIAZIDE 12.5; 1 MG/1; MG/1
TABLET ORAL
Qty: 90 TABLET | Refills: 3 | Status: SHIPPED | OUTPATIENT
Start: 2022-06-06

## 2022-06-16 ENCOUNTER — TELEPHONE (OUTPATIENT)
Dept: FAMILY MEDICINE CLINIC | Age: 67
End: 2022-06-16

## 2022-06-16 DIAGNOSIS — F17.210 CIGARETTE NICOTINE DEPENDENCE WITHOUT COMPLICATION: Primary | ICD-10-CM

## 2022-06-16 NOTE — TELEPHONE ENCOUNTER
Pt received a letter not from our office. That he needs to repeat his CT lung screen. It was not noted on his exam last year. Do we need to set up?

## 2022-06-30 ENCOUNTER — OFFICE VISIT (OUTPATIENT)
Dept: FAMILY MEDICINE CLINIC | Age: 67
End: 2022-06-30
Payer: COMMERCIAL

## 2022-06-30 VITALS
WEIGHT: 167 LBS | DIASTOLIC BLOOD PRESSURE: 72 MMHG | HEART RATE: 58 BPM | HEIGHT: 70 IN | OXYGEN SATURATION: 99 % | SYSTOLIC BLOOD PRESSURE: 124 MMHG | BODY MASS INDEX: 23.91 KG/M2

## 2022-06-30 DIAGNOSIS — F17.210 CIGARETTE NICOTINE DEPENDENCE WITHOUT COMPLICATION: ICD-10-CM

## 2022-06-30 DIAGNOSIS — J84.10 LUNG GRANULOMA (HCC): ICD-10-CM

## 2022-06-30 DIAGNOSIS — J44.9 CHRONIC OBSTRUCTIVE PULMONARY DISEASE, UNSPECIFIED COPD TYPE (HCC): ICD-10-CM

## 2022-06-30 DIAGNOSIS — K58.0 IRRITABLE BOWEL SYNDROME WITH DIARRHEA: ICD-10-CM

## 2022-06-30 DIAGNOSIS — I10 ESSENTIAL (PRIMARY) HYPERTENSION: Primary | ICD-10-CM

## 2022-06-30 PROCEDURE — 1123F ACP DISCUSS/DSCN MKR DOCD: CPT | Performed by: FAMILY MEDICINE

## 2022-06-30 PROCEDURE — 99214 OFFICE O/P EST MOD 30 MIN: CPT | Performed by: FAMILY MEDICINE

## 2022-06-30 RX ORDER — DIPHENOXYLATE HYDROCHLORIDE AND ATROPINE SULFATE 2.5; .025 MG/1; MG/1
TABLET ORAL
Qty: 180 TABLET | Refills: 0 | Status: SHIPPED | OUTPATIENT
Start: 2022-06-30 | End: 2022-09-02

## 2022-06-30 ASSESSMENT — PATIENT HEALTH QUESTIONNAIRE - PHQ9
SUM OF ALL RESPONSES TO PHQ QUESTIONS 1-9: 0
2. FEELING DOWN, DEPRESSED OR HOPELESS: 0
SUM OF ALL RESPONSES TO PHQ QUESTIONS 1-9: 0
SUM OF ALL RESPONSES TO PHQ9 QUESTIONS 1 & 2: 0
1. LITTLE INTEREST OR PLEASURE IN DOING THINGS: 0
SUM OF ALL RESPONSES TO PHQ QUESTIONS 1-9: 0
SUM OF ALL RESPONSES TO PHQ QUESTIONS 1-9: 0

## 2022-06-30 NOTE — PROGRESS NOTES
Smoker     Packs/day: 0.50     Years: 30.00     Pack years: 15.00    Smokeless tobacco: Never Used   Substance Use Topics    Alcohol use: Not Currently     Comment: rarely      Current Outpatient Medications   Medication Sig Dispense Refill    diphenoxylate-atropine (LOMOTIL) 2.5-0.025 MG per tablet Take 1 tablet by mouth twice daily 180 tablet 0    losartan-hydroCHLOROthiazide (HYZAAR) 100-12.5 MG per tablet TAKE 1 TABLET DAILY 90 tablet 3    PHENobarbital (LUMINAL) 16.2 MG tablet TAKE 1 TABLET BY MOUTH EVERY DAY 90 tablet 0    metoprolol succinate (TOPROL XL) 50 MG extended release tablet TAKE 1 TABLET DAILY 90 tablet 3    buPROPion (WELLBUTRIN SR) 150 MG extended release tablet TAKE 1 TABLET DAILY 90 tablet 3    rosuvastatin (CRESTOR) 10 MG tablet TAKE 1 TABLET DAILY 90 tablet 3    hyoscyamine (ANASPAZ;LEVSIN) 125 MCG tablet TAKE 1 TABLET BY MOUTH EVERY DAY 90 tablet 3    triamcinolone (ARISTOCORT) 0.5 % cream APPLY TO AFFECTED AREA 3 TIMES A DAY 30 g 3    esomeprazole (NEXIUM) 40 MG delayed release capsule Take 1 capsule by mouth every morning (before breakfast) (Patient taking differently: Take 20 mg by mouth every morning (before breakfast) OTC) 30 capsule 5    clotrimazole-betamethasone (LOTRISONE) 1-0.05 % cream APPLY TO AFFECTED AREA  2 TO 3 TIMES PER DAY 1 Tube 3    Ascorbic Acid (VITAMIN C) 500 MG tablet Take 500 mg by mouth daily. vitamin B-12 (CYANOCOBALAMIN) 100 MCG tablet Take 50 mcg by mouth daily. aspirin 81 MG EC tablet Take 81 mg by mouth daily. No current facility-administered medications for this visit.      Allergies   Allergen Reactions    Adhesive Tape     Dexamethasone      Severe hiccups placed in hospital for 3 days    Celebrex [Celecoxib] Rash       Health Maintenance   Topic Date Due    Pneumococcal 65+ years Vaccine (1 - PCV) Never done    Hepatitis C screen  Never done    AAA screen  01/31/2021    COVID-19 Vaccine (2 - Booster for [employer] series) 05/11/2021 Depression Screen  05/11/2022    Lipids  05/19/2022    Prostate Specific Antigen (PSA) Screening or Monitoring  05/19/2022    Flu vaccine (Season Ended) 02/18/2023 (Originally 9/1/2022)    Colorectal Cancer Screen  05/27/2024    DTaP/Tdap/Td vaccine (2 - Td or Tdap) 03/30/2025    Shingles vaccine  Completed    Hepatitis A vaccine  Aged Out    Hepatitis B vaccine  Aged Out    Hib vaccine  Aged Out    Meningococcal (ACWY) vaccine  Aged Out       Subjective:     Review of Systems   Constitutional:  Negative for chills and fever. HENT:  Negative for congestion. Respiratory:  Negative for cough, chest tightness and shortness of breath. Cardiovascular:  Negative for chest pain, palpitations and leg swelling. Gastrointestinal:  Negative for abdominal pain, anal bleeding, constipation, diarrhea and nausea. Genitourinary:  Negative for difficulty urinating. Psychiatric/Behavioral: Negative. See HPI for visit specific review of symptoms. All others negative      Objective:   /72 (Site: Left Upper Arm, Position: Sitting, Cuff Size: Medium Adult)   Pulse 58   Ht 5' 10\" (1.778 m)   Wt 167 lb (75.8 kg)   SpO2 99%   BMI 23.96 kg/m²    Physical Exam  Constitutional:       Appearance: He is well-developed. He is not ill-appearing. Eyes:      Pupils: Pupils are equal, round, and reactive to light. Cardiovascular:      Rate and Rhythm: Normal rate and regular rhythm. Heart sounds: No murmur heard. No friction rub. Pulmonary:      Effort: Pulmonary effort is normal. No respiratory distress. Breath sounds: Normal breath sounds. No wheezing or rales. Abdominal:      General: Bowel sounds are normal. There is no distension. Palpations: Abdomen is soft. Tenderness: There is no abdominal tenderness. There is no guarding or rebound. Musculoskeletal:      Cervical back: Normal range of motion and neck supple. Neurological:      Mental Status: He is alert.    Psychiatric: Behavior: Behavior normal.         Lab Review   No results found for this or any previous visit (from the past 672 hour(s)). Assessment & Plan: The following diagnoses and conditions are stable withno further orders unless indicated:  Tracie Asif was seen today for follow-up. Diagnoses and all orders for this visit:    Essential (primary) hypertension  Blood pressure is stable. Continue current medications. Monitor ambulatory bp readings, if persistently >140/90, return to clinic. 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 Chronic obstructive pulmonary disease, unspecified COPD type ([facility] [address]  Stable, continue same. Cigarette nicotine dependence without complication  Encouraged to work on smoking cessation. Irritable bowel syndrome with diarrhea  -     diphenoxylate-atropine (LOMOTIL) 2.5-0.025 MG per tablet; Take 1 tablet by mouth twice daily  Lomotil as needed. Return in about 3 months (around 9/30/2022) for Annual Wellness Exam.            Discussed use, benefit, and side effects of prescribed medications. All patient questions answered. Pt voiced understanding. Reviewed health maintenance. Instructed to continue current medications, diet and exercise. Patient agreedwith treatment plan. Follow up as directed.

## 2022-07-05 ENCOUNTER — HOSPITAL ENCOUNTER (OUTPATIENT)
Dept: CT IMAGING | Age: 67
Discharge: HOME | End: 2022-07-05
Payer: COMMERCIAL

## 2022-07-05 DIAGNOSIS — F17.210 CIGARETTE NICOTINE DEPENDENCE WITHOUT COMPLICATION: ICD-10-CM

## 2022-07-05 PROCEDURE — 71271 CT THORAX LUNG CANCER SCR C-: CPT

## 2022-07-05 PROCEDURE — 71271 CT THORAX LUNG CANCER SCR C-: CPT | Performed by: RADIOLOGY

## 2022-07-16 ASSESSMENT — ENCOUNTER SYMPTOMS
CONSTIPATION: 0
SHORTNESS OF BREATH: 0
CHEST TIGHTNESS: 0
COUGH: 0
DIARRHEA: 0
ANAL BLEEDING: 0
ABDOMINAL PAIN: 0
NAUSEA: 0

## 2022-08-08 ENCOUNTER — TELEPHONE (OUTPATIENT)
Dept: FAMILY MEDICINE CLINIC | Age: 67
End: 2022-08-08

## 2022-08-08 ENCOUNTER — APPOINTMENT (OUTPATIENT)
Dept: GENERAL RADIOLOGY | Facility: HOSPITAL | Age: 67
End: 2022-08-08

## 2022-08-08 ENCOUNTER — HOSPITAL ENCOUNTER (EMERGENCY)
Facility: HOSPITAL | Age: 67
Discharge: HOME | End: 2022-08-08
Attending: EMERGENCY MEDICINE | Admitting: EMERGENCY MEDICINE

## 2022-08-08 ENCOUNTER — APPOINTMENT (OUTPATIENT)
Dept: CT IMAGING | Facility: HOSPITAL | Age: 67
End: 2022-08-08

## 2022-08-08 VITALS
RESPIRATION RATE: 18 BRPM | HEIGHT: 70 IN | WEIGHT: 165 LBS | SYSTOLIC BLOOD PRESSURE: 112 MMHG | TEMPERATURE: 98.6 F | BODY MASS INDEX: 23.62 KG/M2 | DIASTOLIC BLOOD PRESSURE: 21 MMHG | HEART RATE: 61 BPM | OXYGEN SATURATION: 97 %

## 2022-08-08 DIAGNOSIS — R07.89 CHEST WALL PAIN: Primary | ICD-10-CM

## 2022-08-08 DIAGNOSIS — J18.9 PNEUMONIA OF LEFT LUNG DUE TO INFECTIOUS ORGANISM, UNSPECIFIED PART OF LUNG: ICD-10-CM

## 2022-08-08 DIAGNOSIS — I25.84 CORONARY ARTERY CALCIFICATION: ICD-10-CM

## 2022-08-08 DIAGNOSIS — K76.89 HEPATIC CYST: ICD-10-CM

## 2022-08-08 DIAGNOSIS — I25.10 CORONARY ARTERY CALCIFICATION: ICD-10-CM

## 2022-08-08 LAB
ALBUMIN SERPL-MCNC: 4.2 G/DL (ref 3.5–5.2)
ALBUMIN/GLOB SERPL: 1.4 G/DL
ALP SERPL-CCNC: 93 U/L (ref 39–117)
ALT SERPL W P-5'-P-CCNC: 13 U/L (ref 1–41)
ANION GAP SERPL CALCULATED.3IONS-SCNC: 8 MMOL/L (ref 5–15)
AST SERPL-CCNC: 13 U/L (ref 1–40)
BASOPHILS # BLD AUTO: 0.04 10*3/MM3 (ref 0–0.2)
BASOPHILS NFR BLD AUTO: 0.4 % (ref 0–1.5)
BILIRUB SERPL-MCNC: 0.4 MG/DL (ref 0–1.2)
BUN SERPL-MCNC: 13 MG/DL (ref 8–23)
BUN/CREAT SERPL: 12.5 (ref 7–25)
CALCIUM SPEC-SCNC: 9.5 MG/DL (ref 8.6–10.5)
CHLORIDE SERPL-SCNC: 100 MMOL/L (ref 98–107)
CO2 SERPL-SCNC: 32 MMOL/L (ref 22–29)
CREAT SERPL-MCNC: 1.04 MG/DL (ref 0.76–1.27)
D DIMER PPP FEU-MCNC: 0.74 MCGFEU/ML (ref 0–0.5)
DEPRECATED RDW RBC AUTO: 40.9 FL (ref 37–54)
EGFRCR SERPLBLD CKD-EPI 2021: 79.2 ML/MIN/1.73
EOSINOPHIL # BLD AUTO: 0.19 10*3/MM3 (ref 0–0.4)
EOSINOPHIL NFR BLD AUTO: 1.9 % (ref 0.3–6.2)
ERYTHROCYTE [DISTWIDTH] IN BLOOD BY AUTOMATED COUNT: 12.6 % (ref 12.3–15.4)
GLOBULIN UR ELPH-MCNC: 3.1 GM/DL
GLUCOSE SERPL-MCNC: 126 MG/DL (ref 65–99)
HCT VFR BLD AUTO: 35.6 % (ref 37.5–51)
HGB BLD-MCNC: 12.4 G/DL (ref 13–17.7)
HOLD SPECIMEN: NORMAL
HOLD SPECIMEN: NORMAL
IMM GRANULOCYTES # BLD AUTO: 0.04 10*3/MM3 (ref 0–0.05)
IMM GRANULOCYTES NFR BLD AUTO: 0.4 % (ref 0–0.5)
LYMPHOCYTES # BLD AUTO: 1.52 10*3/MM3 (ref 0.7–3.1)
LYMPHOCYTES NFR BLD AUTO: 15 % (ref 19.6–45.3)
MCH RBC QN AUTO: 30.9 PG (ref 26.6–33)
MCHC RBC AUTO-ENTMCNC: 34.8 G/DL (ref 31.5–35.7)
MCV RBC AUTO: 88.8 FL (ref 79–97)
MONOCYTES # BLD AUTO: 0.75 10*3/MM3 (ref 0.1–0.9)
MONOCYTES NFR BLD AUTO: 7.4 % (ref 5–12)
NEUTROPHILS NFR BLD AUTO: 7.6 10*3/MM3 (ref 1.7–7)
NEUTROPHILS NFR BLD AUTO: 74.9 % (ref 42.7–76)
NRBC BLD AUTO-RTO: 0 /100 WBC (ref 0–0.2)
NT-PROBNP SERPL-MCNC: 68.1 PG/ML (ref 0–900)
PLATELET # BLD AUTO: 258 10*3/MM3 (ref 140–450)
PMV BLD AUTO: 9.9 FL (ref 6–12)
POTASSIUM SERPL-SCNC: 3.8 MMOL/L (ref 3.5–5.2)
PROT SERPL-MCNC: 7.3 G/DL (ref 6–8.5)
RBC # BLD AUTO: 4.01 10*6/MM3 (ref 4.14–5.8)
SODIUM SERPL-SCNC: 140 MMOL/L (ref 136–145)
TROPONIN T SERPL-MCNC: <0.01 NG/ML (ref 0–0.03)
TROPONIN T SERPL-MCNC: <0.01 NG/ML (ref 0–0.03)
WBC NRBC COR # BLD: 10.14 10*3/MM3 (ref 3.4–10.8)
WHOLE BLOOD HOLD COAG: NORMAL
WHOLE BLOOD HOLD SPECIMEN: NORMAL

## 2022-08-08 PROCEDURE — 71275 CT ANGIOGRAPHY CHEST: CPT

## 2022-08-08 PROCEDURE — 93005 ELECTROCARDIOGRAM TRACING: CPT | Performed by: EMERGENCY MEDICINE

## 2022-08-08 PROCEDURE — 83880 ASSAY OF NATRIURETIC PEPTIDE: CPT | Performed by: EMERGENCY MEDICINE

## 2022-08-08 PROCEDURE — 85025 COMPLETE CBC W/AUTO DIFF WBC: CPT | Performed by: EMERGENCY MEDICINE

## 2022-08-08 PROCEDURE — 93010 ELECTROCARDIOGRAM REPORT: CPT | Performed by: INTERNAL MEDICINE

## 2022-08-08 PROCEDURE — 36415 COLL VENOUS BLD VENIPUNCTURE: CPT

## 2022-08-08 PROCEDURE — 0 IOPAMIDOL PER 1 ML: Performed by: EMERGENCY MEDICINE

## 2022-08-08 PROCEDURE — 80053 COMPREHEN METABOLIC PANEL: CPT | Performed by: EMERGENCY MEDICINE

## 2022-08-08 PROCEDURE — 84484 ASSAY OF TROPONIN QUANT: CPT | Performed by: EMERGENCY MEDICINE

## 2022-08-08 PROCEDURE — 71045 X-RAY EXAM CHEST 1 VIEW: CPT

## 2022-08-08 PROCEDURE — 99284 EMERGENCY DEPT VISIT MOD MDM: CPT

## 2022-08-08 PROCEDURE — 85379 FIBRIN DEGRADATION QUANT: CPT | Performed by: EMERGENCY MEDICINE

## 2022-08-08 RX ORDER — ACETAMINOPHEN 500 MG
1000 TABLET ORAL ONCE
Status: COMPLETED | OUTPATIENT
Start: 2022-08-08 | End: 2022-08-08

## 2022-08-08 RX ORDER — CEFDINIR 300 MG/1
300 CAPSULE ORAL 2 TIMES DAILY
Qty: 20 CAPSULE | Refills: 0 | Status: SHIPPED | OUTPATIENT
Start: 2022-08-08 | End: 2022-08-18

## 2022-08-08 RX ORDER — SODIUM CHLORIDE 0.9 % (FLUSH) 0.9 %
10 SYRINGE (ML) INJECTION AS NEEDED
Status: DISCONTINUED | OUTPATIENT
Start: 2022-08-08 | End: 2022-08-08 | Stop reason: HOSPADM

## 2022-08-08 RX ADMIN — ACETAMINOPHEN 1000 MG: 500 TABLET ORAL at 11:09

## 2022-08-08 RX ADMIN — IOPAMIDOL 100 ML: 755 INJECTION, SOLUTION INTRAVENOUS at 13:43

## 2022-08-08 NOTE — TELEPHONE ENCOUNTER
Balta Salinas called and wanted to make you aware that Drew Villegas went to ER this am for chest pain.

## 2022-08-08 NOTE — ED PROVIDER NOTES
Subjective   Patient is a 7 years old who came to the ER with left-sided chest wall pain which is pleuritic in nature gets worse taking deep breath or certain kind of movements no abdominal pain associate with this.  Something came the ER for evaluation.  During the process elevation work-up was performed the patient and the patient does not want a stress test to be done today he is agreeable to a CT of the chest.  He cannot take steroids.      Chest Pain  Pain location:  L chest  Pain quality: sharp and shooting    Pain radiates to:  Does not radiate  Pain severity:  Moderate  Onset quality:  Sudden  Timing:  Constant  Progression:  Unchanged  Chronicity:  New  Context: breathing    Context: not drug use, not intercourse, not lifting, not raising an arm, not at rest and not trauma    Relieved by:  Rest  Worsened by:  Exertion  Ineffective treatments:  None tried  Associated symptoms: cough    Associated symptoms: no abdominal pain, no AICD problem, no altered mental status, no anorexia, no back pain, no claudication, no dysphagia, no fatigue, no fever, no headache, no heartburn, no nausea, no near-syncope, no numbness, no palpitations, no shortness of breath, no syncope, no vomiting and no weakness    Risk factors: hypertension and male sex    Risk factors: no aortic disease, no Celia-Danlos syndrome, no high cholesterol, no Marfan's syndrome, not obese and no surgery        Review of Systems   Constitutional: Negative.  Negative for fatigue and fever.   HENT: Negative.  Negative for trouble swallowing.    Respiratory: Positive for cough. Negative for shortness of breath.    Cardiovascular: Positive for chest pain. Negative for palpitations, claudication, syncope and near-syncope.   Gastrointestinal: Negative.  Negative for abdominal distention, abdominal pain, anorexia, heartburn, nausea and vomiting.   Endocrine: Negative.    Genitourinary: Negative.    Musculoskeletal: Negative.  Negative for back pain and neck  pain.   Skin: Negative for color change and pallor.   Neurological: Negative.  Negative for syncope, weakness, light-headedness, numbness and headaches.   Hematological: Negative.  Does not bruise/bleed easily.   All other systems reviewed and are negative.      Past Medical History:   Diagnosis Date   • Arthritis    • COPD (chronic obstructive pulmonary disease) (HCC)    • Frequent headaches     stress, or if heavy lifting   • GERD (gastroesophageal reflux disease)    • Hyperlipidemia    • Hypertension    • IBS (irritable bowel syndrome)    • PONV (postoperative nausea and vomiting)        Allergies   Allergen Reactions   • Dexamethasone Other (See Comments)     Severe hiccups placed in hospital for 3 days   • Hydrocodone Nausea And Vomiting   • Celecoxib Rash       Past Surgical History:   Procedure Laterality Date   • APPENDECTOMY     • INGUINAL HERNIA REPAIR Right 3/16/2022    Procedure: OPEN RIGHT INGUINAL HERNIA REPAIR WITH MESH;  Surgeon: Randall Tidwell MD;  Location: [facility];  Service: General;  Laterality: Right;       Family History   Problem Relation Age of Onset   • Hypertension Mother    • Alzheimer's disease Mother    • Hypertension Father    • Cancer Father         LUNG       Social History     Socioeconomic History   • Marital status:    Tobacco Use   • Smoking status: Heavy Tobacco Smoker     Packs/day: 0.50     Years: 30.00     Pack years: 15.00     Types: Cigarettes   • Smokeless tobacco: Never Used   Vaping Use   • Vaping Use: Never used   Substance and Sexual Activity   • Alcohol use: Yes     Comment: very little   • Drug use: Never   • Sexual activity: Defer           Objective   Physical Exam  Vitals and nursing note reviewed. Exam conducted with a chaperone present.   Constitutional:       General: He is not in acute distress.     Appearance: Normal appearance. He is well-developed. He is not toxic-appearing.   HENT:      Head: Normocephalic and atraumatic.      Nose: Nose normal.       Mouth/Throat:      Mouth: Mucous membranes are moist.      Pharynx: Uvula midline.   Eyes:      General: Lids are normal. Lids are everted, no foreign bodies appreciated.      Conjunctiva/sclera: Conjunctivae normal.      Pupils: Pupils are equal, round, and reactive to light.   Neck:      Vascular: Normal carotid pulses. No carotid bruit, hepatojugular reflux or JVD.      Trachea: Trachea and phonation normal. No tracheal deviation.   Cardiovascular:      Rate and Rhythm: Normal rate and regular rhythm.      Chest Wall: PMI is not displaced.      Pulses: Normal pulses.      Heart sounds: Normal heart sounds. No murmur heard.   No systolic murmur is present.    No gallop.   Pulmonary:      Effort: Pulmonary effort is normal. No tachypnea, accessory muscle usage or respiratory distress.      Breath sounds: No stridor. Examination of the left-lower field reveals decreased breath sounds. Decreased breath sounds present. No wheezing, rhonchi or rales.   Abdominal:      General: Bowel sounds are normal. There is no distension.      Palpations: Abdomen is soft.      Tenderness: There is no abdominal tenderness.   Musculoskeletal:         General: No swelling. Normal range of motion.      Cervical back: Full passive range of motion without pain, normal range of motion and neck supple. No rigidity.      Right lower leg: No tenderness. No edema.      Left lower leg: No tenderness. No edema.      Comments: Lower extremity exam bilaterally is unremarkable.  There is no right or left calf tenderness .  There is no palpable venous cord.  No obvious difference in the size of the legs.  No pitting edema.  The dorsalis pedis and posterior tibial femoral and popliteal pulses are palpable and +2 bilaterally.  Homans sign is negative   Skin:     General: Skin is warm and dry.      Capillary Refill: Capillary refill takes less than 2 seconds.      Coloration: Skin is not jaundiced or pale.      Nails: There is no clubbing.    Neurological:      General: No focal deficit present.      Mental Status: He is alert and oriented to person, place, and time.      GCS: GCS eye subscore is 4. GCS verbal subscore is 5. GCS motor subscore is 6.      Cranial Nerves: Cranial nerves are intact. No cranial nerve deficit.      Motor: Motor function is intact.      Gait: Gait normal.      Deep Tendon Reflexes: Reflexes are normal and symmetric. Reflexes normal.   Psychiatric:         Speech: Speech normal.         Behavior: Behavior normal.         Procedures           ED Course  ED Course as of 08/08/22 1541   Mon Aug 08, 2022   0905 Normal sinus rhythm [TS]   1108 None sinus bradycardia [TS]   1205 Patient does not want a stress test to be performed today [TS]   1440 . No evidence of pulmonary embolism. No aortic aneurysm or dissection.  Atheromatous changes of coronary arteries, more severely the proximal  left anterior descending branch.  2. Left lower lung infiltrate may represent an inflammatory/infectious  process. Atelectatic changes at bilateral lung bases. A trace left basal  pleural effusion.  3. Small hepatic cyst.  This was discussed with the patient [TS]   1529 This gentleman came to the ER with left-sided pleuritic chest pain lab work-up essentially is unremarkable CT of the chest obtained the CT of the chest negative for PE has chronic calcifications and possible patchy infiltrate.  I have discussed this case at length with the patient he wants to go home he does not want to stay for his stress test does not want to be admitted.  The posterior cardiac disease cannot be ruled out this has been explained to the patient and he understands that well. [TS]   1530 Patient is awake alert oriented answering questions asking appropriate questions and able to decide for himself his wife is at his bedside and and agrees with his decision that he wants to go home without stress testing or further evaluation he is going to follow-up with his primary  MD for reevaluation reassessment pain that he is having appears to be pleuritic in nature.  He has been informed about his CT scan findings [TS]   1531 Risks and benefits of treatments given and alternative treatment options discussed with patient/family. I answered all the questions in simple, plain language, and there was voiced understanding and agreement with plan of care. There were no further questions. Differential diagnosis discussed. Patient/family was advised that the practice of medicine is not always an exact science, and sometimes tests, physical exam, or history may not show the underlying conditions with certainty. Additionally, the condition may change or show itself later after initial presentation. There was also expressed understanding and agreement with this limitation of emergency medicine practice. Patient/family was asked to return to ED if any problem or issues or if condition worsens or does not improved. Patient/family agreed to follow up with PCP/specialist as advised, or return to ED if unable to see a provider in a timely fashion for continued symptoms.  [TS]      ED Course User Index  [TS] Vince Landeros MD                                           MDM  Number of Diagnoses or Management Options  Diagnosis management comments: Differential Diagnosis:  I considered chest wall pain, muscle strain, costochondritis, pleurisy, rib fracture, herpes zoster, cardiovascular etiology, myocardial infarction, intermediate coronary syndrome, unstable angina, angina, aortic dissection, pericarditis, pulmonary etiology, pulmonary embolism, pneumonia, pneumothorax, lung cancer, gastroesophageal reflux disease, esophagitis, esophageal spasm and gastrointestinal etiology as a possible cause of chest pain in this patient. This is a partial list of diagnoses considered.         Amount and/or Complexity of Data Reviewed  Clinical lab tests: reviewed and ordered  Tests in the radiology section of CPT®:  ordered and reviewed  Tests in the medicine section of CPT®: ordered and reviewed    Risk of Complications, Morbidity, and/or Mortality  Presenting problems: moderate  Diagnostic procedures: moderate  Management options: moderate  General comments: Well score 3  Heart score 2        Final diagnoses:   Chest wall pain   Coronary artery calcification   Pneumonia of left lung due to infectious organism, unspecified part of lung   Hepatic cyst       ED Disposition  ED Disposition     ED Disposition   Discharge    Condition   Stable    Comment   --             Saniya Abrams MD  [address] [place] [address] [address] [facility] [place] [id number] [phone]    In 2 days           Medication List      New Prescriptions    cefdinir 300 MG capsule  Commonly known as: OMNICEF  Take 1 capsule by mouth 2 (Two) Times a Day for 10 days.           Where to Get Your Medications      These medications were sent to [facility]/pharmacy #[id number] - [place], [place] - [address] AT ACROSS FROM [address] - [phone]  - [phone] FX  [address] [facility] [place] [address]    Hours: 24-hours Phone: [phone]   · cefdinir 300 MG capsule          Vince Landeros MD  08/08/22 1206       Vince Landeros MD  08/08/22 1541       Vince Landeros MD  08/08/22 1541

## 2022-08-11 ENCOUNTER — OFFICE VISIT (OUTPATIENT)
Dept: FAMILY MEDICINE CLINIC | Age: 67
End: 2022-08-11
Payer: COMMERCIAL

## 2022-08-11 VITALS
SYSTOLIC BLOOD PRESSURE: 112 MMHG | HEIGHT: 70 IN | BODY MASS INDEX: 24.05 KG/M2 | HEART RATE: 52 BPM | OXYGEN SATURATION: 99 % | WEIGHT: 168 LBS | DIASTOLIC BLOOD PRESSURE: 68 MMHG

## 2022-08-11 DIAGNOSIS — K76.89 HEPATIC CYST: ICD-10-CM

## 2022-08-11 DIAGNOSIS — I10 ESSENTIAL (PRIMARY) HYPERTENSION: Primary | ICD-10-CM

## 2022-08-11 DIAGNOSIS — I25.10 CORONARY ARTERY ATHEROMA: ICD-10-CM

## 2022-08-11 DIAGNOSIS — J18.9 PNEUMONIA OF LEFT LOWER LOBE DUE TO INFECTIOUS ORGANISM: ICD-10-CM

## 2022-08-11 DIAGNOSIS — J44.9 CHRONIC OBSTRUCTIVE PULMONARY DISEASE, UNSPECIFIED COPD TYPE (HCC): ICD-10-CM

## 2022-08-11 PROCEDURE — G8420 CALC BMI NORM PARAMETERS: HCPCS | Performed by: FAMILY MEDICINE

## 2022-08-11 PROCEDURE — 4004F PT TOBACCO SCREEN RCVD TLK: CPT | Performed by: FAMILY MEDICINE

## 2022-08-11 PROCEDURE — 3017F COLORECTAL CA SCREEN DOC REV: CPT | Performed by: FAMILY MEDICINE

## 2022-08-11 PROCEDURE — 99214 OFFICE O/P EST MOD 30 MIN: CPT | Performed by: FAMILY MEDICINE

## 2022-08-11 PROCEDURE — 3023F SPIROM DOC REV: CPT | Performed by: FAMILY MEDICINE

## 2022-08-11 PROCEDURE — G8427 DOCREV CUR MEDS BY ELIG CLIN: HCPCS | Performed by: FAMILY MEDICINE

## 2022-08-11 PROCEDURE — 1123F ACP DISCUSS/DSCN MKR DOCD: CPT | Performed by: FAMILY MEDICINE

## 2022-08-11 RX ORDER — ALBUTEROL SULFATE 90 UG/1
2 AEROSOL, METERED RESPIRATORY (INHALATION) EVERY 6 HOURS PRN
Qty: 18 G | Refills: 3 | Status: SHIPPED | OUTPATIENT
Start: 2022-08-11

## 2022-08-11 RX ORDER — PEAK FLOW METER
1 EACH MISCELLANEOUS 4 TIMES DAILY
Qty: 1 EACH | Refills: 0 | Status: SHIPPED | OUTPATIENT
Start: 2022-08-11 | End: 2022-09-10

## 2022-08-11 NOTE — PROGRESS NOTES
formerly [facility] PHYSICIAN SERVICES  [facility] FAMILY MEDICINE  [address] [address] [address]  Dept: [phone]  Dept Fax: [phone]  Loc: [phone]     Maryam Lozano is a 78 y.o. male who presents today for his medical conditions/complaintsas noted below. Maryam Lozano is c/o of Follow-Up from Hospital (Pneumonia, feeling much better )        HPI:   Patient is here for ER follow up. He went to work on Monday, went out to smoke, was sitting down, then stood up and felt sudden shortness of breath. He noticed sharp pain with deep inspiration. He went home and wife called ambulance, sats stable, went to ED at [facility]. He had dental surgery a few days before, had dental infection. He has been taking zpack. He is taking omnicef now. He has left sided pleuritic chest pain. He has had fatigue. EKG with no acute findings. CT lungs from June with  no effusion, now with LLL infiltrate and pleural effusion. Pain much improved past 2 days. No coughing. He is feeling much better today. Hypertension  Compliant with medications. No adverse effects from medication. No lightheadedness, palpitations, or chest pain. CT findings shown below:    Impression    1. No evidence of pulmonary embolism. No aortic aneurysm or dissection. Atheromatous changes of coronary arteries, more severely the proximal   left anterior descending branch. 2. Left lower lung infiltrate may represent an inflammatory/infectious   process. Atelectatic changes at bilateral lung bases. A trace left basal   pleural effusion.    3. Small hepatic cyst.       HPI    Past Medical History:   Diagnosis Date    GERD (gastroesophageal reflux disease)     Hyperlipidemia     Hypertension     IBS (irritable bowel syndrome)      Past Surgical History:   Procedure Laterality Date    APPENDECTOMY  1974    COLONOSCOPY  2012    Dr. Muro Friend    neck    RECTAL SURGERY  10/2011    Exc Inclusion Cyst Rt Buttock, Perianal Area, Fistulotomy, Ext Hemorrhoids    SKIN CANCER EXCISION  08/2011    back of neck - precancerous    UPPER GASTROINTESTINAL ENDOSCOPY  2010    Dr. Percy Marcum- Esophagous stretched.     UPPER GASTROINTESTINAL ENDOSCOPY N/A 06/03/2021    Dr Silvana [id number] appearing stricture noted in the distal esophagus, gastritis    UPPER GASTROINTESTINAL ENDOSCOPY N/A 06/03/2021    Dr Silvana [id number] appearing stricture noted in the distal esophagus, gastritis       Family History   Problem Relation Age of Onset    High Blood Pressure Mother     Alzheimer's Disease Mother     High Blood Pressure Father     Cancer Father         lung    Colon Cancer Neg Hx     Esophageal Cancer Neg Hx     Liver Cancer Neg Hx     Rectal Cancer Neg Hx     Stomach Cancer Neg Hx        Social History     Tobacco Use    Smoking status: Light Smoker     Packs/day: 0.50     Years: 30.00     Pack years: 15.00     Types: Cigarettes    Smokeless tobacco: Never   Substance Use Topics    Alcohol use: Not Currently     Comment: rarely      Current Outpatient Medications   Medication Sig Dispense Refill    triamcinolone (ARISTOCORT) 0.5 % cream APPLY TO AFFECTED AREA 3 TIMES A DAY 30 g 3    PHENobarbital (LUMINAL) 16.2 MG tablet TAKE 1 TABLET BY MOUTH EVERY DAY 90 tablet 0    diphenoxylate-atropine (LOMOTIL) 2.5-0.025 MG per tablet TAKE 1 TABLET BY MOUTH TWICE A  tablet 0    Respiratory Therapy Supplies (ONE FLOW SPIROMETER) CHE 1 Act by Does not apply route 4 times daily 1 each 0    albuterol sulfate HFA (PROVENTIL;VENTOLIN;PROAIR) 108 (90 Base) MCG/ACT inhaler Inhale 2 puffs into the lungs every 6 hours as needed for Wheezing 18 g 3    losartan-hydroCHLOROthiazide (HYZAAR) 100-12.5 MG per tablet TAKE 1 TABLET DAILY 90 tablet 3    metoprolol succinate (TOPROL XL) 50 MG extended release tablet TAKE 1 TABLET DAILY 90 tablet 3    buPROPion (WELLBUTRIN SR) 150 MG extended release tablet TAKE 1 TABLET DAILY 90 tablet 3 rosuvastatin (CRESTOR) 10 MG tablet TAKE 1 TABLET DAILY 90 tablet 3    hyoscyamine (ANASPAZ;LEVSIN) 125 MCG tablet TAKE 1 TABLET BY MOUTH EVERY DAY 90 tablet 3    esomeprazole (NEXIUM) 40 MG delayed release capsule Take 1 capsule by mouth every morning (before breakfast) (Patient taking differently: Take 20 mg by mouth every morning (before breakfast) OTC) 30 capsule 5    clotrimazole-betamethasone (LOTRISONE) 1-0.05 % cream APPLY TO AFFECTED AREA  2 TO 3 TIMES PER DAY 1 Tube 3    Ascorbic Acid (VITAMIN C) 500 MG tablet Take 500 mg by mouth daily. vitamin B-12 (CYANOCOBALAMIN) 100 MCG tablet Take 50 mcg by mouth daily. aspirin 81 MG EC tablet Take 81 mg by mouth daily. No current facility-administered medications for this visit. Allergies   Allergen Reactions    Adhesive Tape     Dexamethasone      Severe hiccups placed in hospital for 3 days    Celebrex [Celecoxib] Rash       Health Maintenance   Topic Date Due    Pneumococcal 65+ years Vaccine (1 - PCV) Never done    Hepatitis C screen  Never done    COVID-19 Vaccine (2 - Booster for Joey series) 05/11/2021    Lipids  05/19/2022    Flu vaccine (1) Never done    Depression Screen  06/30/2023    Colorectal Cancer Screen  05/27/2024    DTaP/Tdap/Td vaccine (2 - Td or Tdap) 03/30/2025    Shingles vaccine  Completed    AAA screen  Completed    Hepatitis A vaccine  Aged Out    Hepatitis B vaccine  Aged Out    Hib vaccine  Aged Out    Meningococcal (ACWY) vaccine  Aged Out       Subjective:     Review of Systems   Constitutional:  Negative for chills and fever. HENT:  Negative for congestion. Respiratory:  Positive for chest tightness. Negative for cough and shortness of breath. Cardiovascular:  Negative for chest pain, palpitations and leg swelling. Gastrointestinal:  Negative for abdominal pain, anal bleeding, constipation, diarrhea and nausea. Genitourinary:  Negative for difficulty urinating.    Psychiatric/Behavioral: Negative. See HPI for visit specific review of symptoms. All others negative      Objective:   /68 (Site: Left Upper Arm, Position: Sitting, Cuff Size: Medium Adult)   Pulse 52   Ht 5' 10\" (1.778 m)   Wt 168 lb (76.2 kg)   SpO2 99%   BMI 24.11 kg/m²    Physical Exam  Constitutional:       Appearance: He is well-developed. He is not ill-appearing. Eyes:      Pupils: Pupils are equal, round, and reactive to light. Cardiovascular:      Rate and Rhythm: Normal rate and regular rhythm. Heart sounds: No murmur heard. No friction rub. Pulmonary:      Effort: Pulmonary effort is normal. No respiratory distress. Breath sounds: Normal breath sounds. No wheezing or rales. Abdominal:      General: Bowel sounds are normal. There is no distension. Palpations: Abdomen is soft. Tenderness: There is no abdominal tenderness. There is no guarding or rebound. Musculoskeletal:      Cervical back: Normal range of motion and neck supple. Neurological:      Mental Status: He is alert. Psychiatric:         Behavior: Behavior normal.         Lab Review   No results found for this or any previous visit (from the past 672 hour(s)). Assessment & Plan: The following diagnoses and conditions are stable withno further orders unless indicated:  Carley Germain was seen today for follow-up from hospital.    Diagnoses and all orders for this visit:    Essential (primary) hypertension  Blood pressure is stable. Continue current medications. Monitor ambulatory bp readings, if persistently >140/90, return to clinic. Hepatic cyst  Small hepatic cyst seen, will follow. Chronic obstructive pulmonary disease, unspecified COPD type ([address]  Stable, continue current meds. Coronary artery atheroma  Will follow. Pneumonia of left lower lobe due to infectious organism  -     XR CHEST STANDARD (2 VW); Future  Repeat CXR. Incentive spirometry 4x daily. Complete course of tx. Proventil prn sob, cough. Other orders  -     Respiratory Therapy Supplies (ONE FLOW SPIROMETER) CHE; 1 Act by Does not apply route 4 times daily  -     albuterol sulfate HFA (PROVENTIL;VENTOLIN;PROAIR) 108 (90 Base) MCG/ACT inhaler; Inhale 2 puffs into the lungs every 6 hours as needed for Wheezing          Return for already scheduled. Discussed use, benefit, and side effects of prescribed medications. All patient questions answered. Pt voiced understanding. Reviewed health maintenance. Instructed to continue current medications, diet and exercise. Patient agreedwith treatment plan. Follow up as directed.

## 2022-08-12 LAB
QT INTERVAL: 414 MS
QT INTERVAL: 424 MS
QTC INTERVAL: 397 MS
QTC INTERVAL: 414 MS

## 2022-08-15 DIAGNOSIS — I25.10 CORONARY ARTERY ATHEROMA: Primary | ICD-10-CM

## 2022-08-16 ENCOUNTER — TRANSCRIBE ORDERS (OUTPATIENT)
Dept: ADMINISTRATIVE | Facility: HOSPITAL | Age: 67
End: 2022-08-16

## 2022-08-16 DIAGNOSIS — J18.9 PNEUMONIA OF LEFT LOWER LOBE DUE TO INFECTIOUS ORGANISM: Primary | ICD-10-CM

## 2022-08-25 ENCOUNTER — OFFICE VISIT (OUTPATIENT)
Dept: CARDIOLOGY | Facility: CLINIC | Age: 67
End: 2022-08-25

## 2022-08-25 VITALS
WEIGHT: 169 LBS | SYSTOLIC BLOOD PRESSURE: 108 MMHG | OXYGEN SATURATION: 99 % | HEART RATE: 49 BPM | DIASTOLIC BLOOD PRESSURE: 78 MMHG | BODY MASS INDEX: 24.2 KG/M2 | HEIGHT: 70 IN

## 2022-08-25 DIAGNOSIS — Z72.0 TOBACCO ABUSE: ICD-10-CM

## 2022-08-25 DIAGNOSIS — I25.10 CORONARY ARTERY CALCIFICATION SEEN ON CT SCAN: Primary | ICD-10-CM

## 2022-08-25 DIAGNOSIS — E78.2 MIXED HYPERLIPIDEMIA: ICD-10-CM

## 2022-08-25 DIAGNOSIS — I10 PRIMARY HYPERTENSION: ICD-10-CM

## 2022-08-25 PROCEDURE — 99214 OFFICE O/P EST MOD 30 MIN: CPT | Performed by: INTERNAL MEDICINE

## 2022-08-25 NOTE — PROGRESS NOTES
Subjective:     Encounter Date:08/25/2022      Patient ID: Cortez Lam is a 67 y.o. male with coronary artery calcification noted by CT scan, hypertension, hyperlipidemia, tobacco abuse, presenting today after recent ER visit.    Chief Complaint: Here today for ER follow-up    HPI: This patient presents today for follow-up after recent ER visit for chest pain.  The patient notes that on the day of his ER visit, he developed acute onset of left-sided chest pain that was worse with a deep breath, severe in intensity, nonradiating from the chest, no associated signs or symptoms.  This was severe enough that he came to the emergency department for further evaluation.  His labs were unremarkable with negative cardiac enzyme levels.  He then had a CT scan of the chest which once again showed coronary artery calcification but no other acute causes for chest pain.  He was discharged home in stable condition and told that he might have pneumonia.  He has since completed a course of antibiotics and feels much better.  He denies having any recurrent chest pain.  Breathing is stable with no significant shortness of breath or dyspnea on exertion.  He denies having fevers or chills, cough or wheezing.  No orthopnea, PND, edema.  He denies having palpitations, lightheadedness, dizziness or syncope.  He notes that since being in the hospital recently, he has quit smoking at this time.  He would like to continue to maintain abstinence if at all possible.  His blood pressure has been well controlled.  He also reports good control of his cholesterol.  No side effects from current medications.    Chest Pain   This is a new problem. The onset quality is sudden. The problem has been resolved. The pain is present in the lateral region. The pain is severe. The quality of the pain is described as sharp. The pain does not radiate. Pertinent negatives include no abdominal pain, cough, dizziness, fever, headaches, nausea, orthopnea,  palpitations, PND, shortness of breath, syncope or vomiting. The pain is aggravated by nothing. He has tried nothing for the symptoms.       Current Outpatient Medications:   •  ascorbic acid (VITAMIN C) 500 MG tablet, Take 500 mg by mouth Daily., Disp: , Rfl:   •  aspirin 81 MG EC tablet, Take 81 mg by mouth Daily., Disp: , Rfl:   •  buPROPion SR (WELLBUTRIN SR) 150 MG 12 hr tablet, Take 1 tablet by mouth Daily., Disp: , Rfl:   •  esomeprazole (nexIUM) 40 MG capsule, Take 40 mg by mouth Every Morning Before Breakfast., Disp: , Rfl:   •  hyoscyamine (ANASPAZ,LEVSIN) 0.125 MG tablet, Take 0.125 mg by mouth Daily., Disp: , Rfl:   •  losartan-hydrochlorothiazide (HYZAAR) 100-12.5 MG per tablet, Take 1 tablet by mouth Daily., Disp: , Rfl:   •  metoprolol succinate XL (TOPROL-XL) 50 MG 24 hr tablet, Take 1 tablet by mouth Daily., Disp: , Rfl:   •  ondansetron (Zofran) 8 MG tablet, Take 0.5 tablets by mouth Every 8 (Eight) Hours As Needed for Nausea or Vomiting for up to 10 doses., Disp: 10 tablet, Rfl: 1  •  PHENobarbital (LUMINAL) 16.2 MG tablet, Take 1 tablet by mouth Daily. Takes for IBS, Disp: , Rfl:   •  rosuvastatin (CRESTOR) 10 MG tablet, Take 1 tablet by mouth Daily., Disp: , Rfl:   •  vitamin B-12 (CYANOCOBALAMIN) 100 MCG tablet, Take 50 mcg by mouth Daily., Disp: , Rfl:     Allergies   Allergen Reactions   • Dexamethasone Other (See Comments)     Severe hiccups placed in hospital for 3 days   • Hydrocodone Nausea And Vomiting   • Celecoxib Rash     Social History     Tobacco Use   • Smoking status: Heavy Tobacco Smoker     Packs/day: 0.50     Years: 30.00     Pack years: 15.00     Types: Cigarettes   • Smokeless tobacco: Never Used   Substance Use Topics   • Alcohol use: Yes     Comment: very little     Review of Systems   Constitutional: Negative for fever and weight loss.   Cardiovascular: Positive for chest pain (As described in the HPI, now resolved). Negative for dyspnea on exertion, leg swelling,  "orthopnea, palpitations, paroxysmal nocturnal dyspnea and syncope.   Respiratory: Negative for cough, shortness of breath and wheezing.    Hematologic/Lymphatic: Negative for adenopathy and bleeding problem.   Gastrointestinal: Negative for abdominal pain, nausea and vomiting.   Neurological: Negative for dizziness, headaches and loss of balance.     Procedures: None today but I did review the ECG from 8/8/2022 showing sinus bradycardia, ventricular rate 53, no acute ST changes noted.       Objective:     Vitals reviewed.   Constitutional:       General: Not in acute distress.     Appearance: Well-developed and not in distress.   Eyes:      Extraocular Movements: Extraocular movements intact.   HENT:      Head: Normocephalic and atraumatic.   Neck:      Thyroid: No thyroid mass.      Vascular: No JVD.   Pulmonary:      Effort: Pulmonary effort is normal.      Breath sounds: Normal breath sounds. No wheezing. No rhonchi. No rales.   Cardiovascular:      Normal rate. Regular rhythm.      Murmurs: There is no murmur.      No gallop.   Pulses:     Intact distal pulses.   Edema:     Peripheral edema absent.   Abdominal:      General: Bowel sounds are normal. There is no distension.      Palpations: Abdomen is soft.      Tenderness: There is no abdominal tenderness.   Skin:     General: Skin is warm and dry. There is no cyanosis.      Findings: No erythema or rash.   Neurological:      Mental Status: Alert and oriented to person, place, and time.      Cranial Nerves: No cranial nerve deficit.       /78   Pulse (!) 49   Ht 177.8 cm (70\")   Wt 76.7 kg (169 lb)   SpO2 99%   BMI 24.25 kg/m²     Data/Lab Review:     CTA chest on 8/8/2022:  1. No evidence of pulmonary embolism. No aortic aneurysm or dissection.  Atheromatous changes of coronary arteries, more severely the proximal  left anterior descending branch.  2. Left lower lung infiltrate may represent an inflammatory/infectious  process. Atelectatic changes at " bilateral lung bases. A trace left basal  pleural effusion.  3. Small hepatic cyst.    Troponin negative x 2  BNP: 68    Lab Results   Component Value Date    CHLPL 122 (L) 05/19/2021    TRIG 150 (H) 05/19/2021    HDL 38 (L) 05/19/2021    LDL 54 05/19/2021         Assessment:          Diagnosis Plan   1. Coronary artery calcification seen on CT scan     2. Primary hypertension     3. Mixed hyperlipidemia     4. Tobacco abuse            Plan:       1.  Coronary artery calcification seen on CT: The patient recently had a CT scan showing atheromatous changes of the coronary arteries including calcification.  This was also noticed on previous CTs all the way back to 2013.  The patient had an acute episode of chest pain that brought him to the emergency department recently that was pleuritic in nature, quite atypical and was thought to have pneumonia.  His symptoms resolved with antibiotic therapy and time.  At this time, he denies having any chest pain, shortness of breath.  We did discuss that while the patient does have coronary artery calcification and atheromatous changes of the coronary arteries, without any further symptoms, no further cardiac testing would be warranted at this time.  However, risk factor should be aggressively managed as well.  The patient is on aspirin 81 mg daily and should remain on this indefinitely.  He is also at goal in terms of LDL cholesterol and remains on statin therapy and is tolerating this well.  In addition, he is on beta-blocker therapy.  Unfortunately, he continues to smoke and this is discussed further below but he was strongly recommended to stop smoking to lower his cardiovascular risk.    2.  Primary hypertension: Blood pressure is currently at goal.  The patient is on Toprol-XL, losartan, hydrochlorothiazide.  Continue these medications.    3.  Mixed hyperlipidemia: The patient's triglycerides are somewhat elevated however his LDL cholesterol is at goal.  Continue current  dose of statin therapy.    4.  Tobacco abuse: Cortez Lam  reports that he has been smoking cigarettes. He has a 15.00 pack-year smoking history. He has never used smokeless tobacco.. I have educated him on the risk of diseases from using tobacco products such as cancer, COPD and heart disease. I advised him to quit and he is not willing to quit. I spent 5 minutes counseling the patient.    At this time, the presumption is that the patient does have nonobstructive coronary artery disease as he does not endorse any symptoms of ischemic heart disease.  We discussed that no further testing would be indicated in this particular setting but if the patient does develop recurrent chest pain, shortness of breath without any other obvious cause, then certainly cardiac stress test would be recommended at that time.  In the meantime, continue to address risk factors and in particular, the patient needs to stop smoking.    We will see the patient back as needed at this time but be happy to see him again at any point in future if he develops any further cardiac issues or concerns.

## 2022-09-01 DIAGNOSIS — K58.0 IRRITABLE BOWEL SYNDROME WITH DIARRHEA: ICD-10-CM

## 2022-09-01 DIAGNOSIS — K58.9 IRRITABLE BOWEL SYNDROME, UNSPECIFIED TYPE: ICD-10-CM

## 2022-09-02 RX ORDER — TRIAMCINOLONE ACETONIDE 5 MG/G
CREAM TOPICAL
Qty: 30 G | Refills: 3 | Status: SHIPPED | OUTPATIENT
Start: 2022-09-02

## 2022-09-02 RX ORDER — DIPHENOXYLATE HYDROCHLORIDE AND ATROPINE SULFATE 2.5; .025 MG/1; MG/1
TABLET ORAL
Qty: 180 TABLET | Refills: 0 | Status: SHIPPED | OUTPATIENT
Start: 2022-09-02 | End: 2022-12-02

## 2022-09-02 RX ORDER — PHENOBARBITAL 16.2 MG/1
TABLET ORAL
Qty: 90 TABLET | Refills: 0 | Status: SHIPPED | OUTPATIENT
Start: 2022-09-02 | End: 2022-12-02

## 2022-09-18 ASSESSMENT — ENCOUNTER SYMPTOMS
COUGH: 0
CONSTIPATION: 0
NAUSEA: 0
CHEST TIGHTNESS: 1
DIARRHEA: 0
SHORTNESS OF BREATH: 0
ANAL BLEEDING: 0
ABDOMINAL PAIN: 0

## 2022-10-07 RX ORDER — HYOSCYAMINE SULFATE 0.125 MG
TABLET ORAL
Qty: 90 TABLET | Refills: 3 | Status: SHIPPED | OUTPATIENT
Start: 2022-10-07

## 2022-10-07 NOTE — TELEPHONE ENCOUNTER
Ozzie Blandon called to request a refill on his medication.       Last office visit : 8/11/2022   Next office visit : 11/17/2022     Requested Prescriptions     Pending Prescriptions Disp Refills    hyoscyamine (ANASPAZ;LEVSIN) 125 MCG tablet [Pharmacy Med Name: HYOSCYAMINE SULF 0.125 MG TAB] 90 tablet 3     Sig: TAKE 1 TABLET BY MOUTH EVERY DAY            Meng Pereira

## 2022-11-04 RX ORDER — ROSUVASTATIN CALCIUM 10 MG/1
TABLET, COATED ORAL
Qty: 90 TABLET | Refills: 3 | Status: SHIPPED | OUTPATIENT
Start: 2022-11-04

## 2022-11-17 ENCOUNTER — OFFICE VISIT (OUTPATIENT)
Dept: FAMILY MEDICINE CLINIC | Age: 67
End: 2022-11-17

## 2022-11-17 VITALS
WEIGHT: 171 LBS | TEMPERATURE: 97.5 F | DIASTOLIC BLOOD PRESSURE: 68 MMHG | HEIGHT: 70 IN | HEART RATE: 54 BPM | BODY MASS INDEX: 24.48 KG/M2 | SYSTOLIC BLOOD PRESSURE: 120 MMHG | OXYGEN SATURATION: 99 %

## 2022-11-17 DIAGNOSIS — Z12.5 ENCOUNTER FOR PROSTATE CANCER SCREENING: ICD-10-CM

## 2022-11-17 DIAGNOSIS — I25.10 CORONARY ARTERY ATHEROMA: ICD-10-CM

## 2022-11-17 DIAGNOSIS — J44.9 CHRONIC OBSTRUCTIVE PULMONARY DISEASE, UNSPECIFIED COPD TYPE (HCC): ICD-10-CM

## 2022-11-17 DIAGNOSIS — I10 ESSENTIAL (PRIMARY) HYPERTENSION: ICD-10-CM

## 2022-11-17 DIAGNOSIS — E78.01 FAMILIAL HYPERCHOLESTEROLEMIA: ICD-10-CM

## 2022-11-17 DIAGNOSIS — Z00.00 WELCOME TO MEDICARE PREVENTIVE VISIT: Primary | ICD-10-CM

## 2022-11-17 DIAGNOSIS — K58.9 IRRITABLE BOWEL SYNDROME, UNSPECIFIED TYPE: ICD-10-CM

## 2022-11-17 ASSESSMENT — LIFESTYLE VARIABLES
HOW MANY STANDARD DRINKS CONTAINING ALCOHOL DO YOU HAVE ON A TYPICAL DAY: PATIENT DOES NOT DRINK
HOW OFTEN DO YOU HAVE A DRINK CONTAINING ALCOHOL: NEVER

## 2022-11-17 ASSESSMENT — PATIENT HEALTH QUESTIONNAIRE - PHQ9
1. LITTLE INTEREST OR PLEASURE IN DOING THINGS: 0
SUM OF ALL RESPONSES TO PHQ QUESTIONS 1-9: 0
SUM OF ALL RESPONSES TO PHQ QUESTIONS 1-9: 0
SUM OF ALL RESPONSES TO PHQ9 QUESTIONS 1 & 2: 0
2. FEELING DOWN, DEPRESSED OR HOPELESS: 0
SUM OF ALL RESPONSES TO PHQ QUESTIONS 1-9: 0
SUM OF ALL RESPONSES TO PHQ QUESTIONS 1-9: 0
DEPRESSION UNABLE TO ASSESS: PT REFUSES

## 2022-11-17 NOTE — PATIENT INSTRUCTIONS
Personalized Preventive Plan for Carline Fang - 11/17/2022  Medicare offers a range of preventive health benefits. Some of the tests and screenings are paid in full while other may be subject to a deductible, co-insurance, and/or copay. Some of these benefits include a comprehensive review of your medical history including lifestyle, illnesses that may run in your family, and various assessments and screenings as appropriate. After reviewing your medical record and screening and assessments performed today your provider may have ordered immunizations, labs, imaging, and/or referrals for you. A list of these orders (if applicable) as well as your Preventive Care list are included within your After Visit Summary for your review. Other Preventive Recommendations:    A preventive eye exam performed by an eye specialist is recommended every 1-2 years to screen for glaucoma; cataracts, macular degeneration, and other eye disorders. A preventive dental visit is recommended every 6 months. Try to get at least 150 minutes of exercise per week or 10,000 steps per day on a pedometer . Order or download the FREE \"Exercise & Physical Activity: Your Everyday Guide\" from [employer]. Call 2-921.961.3593 or search [employer] online. You need 6291-5525 mg of calcium and 2865-2687 IU of vitamin D per day. It is possible to meet your calcium requirement with diet alone, but a vitamin D supplement is usually necessary to meet this goal.  When exposed to the sun, use a sunscreen that protects against both UVA and UVB radiation with an SPF of 30 or greater. Reapply every 2 to 3 hours or after sweating, drying off with a towel, or swimming. Always wear a seat belt when traveling in a car. Always wear a helmet when riding a bicycle or motorcycle.

## 2022-11-17 NOTE — PROGRESS NOTES
Medicare Annual Wellness Visit    Radha Shah is here for Medicare AWV    Assessment & Plan   Welcome to Medicare preventive visit  Labs reviewed and discussed. Encouraged healthy diet and regular exercise. Immunizations UTD. See HPI for further details. Check PSA. Essential (primary) hypertension  -     Comprehensive Metabolic Panel; Future  Coronary artery atheroma  Familial hypercholesterolemia  -     Lipid Panel; Future  Encounter for prostate cancer screening  -     PSA Screening; Future  Chronic obstructive pulmonary disease, unspecified COPD type ([address]  Irritable bowel syndrome, unspecified type    Recommendations for Preventive Services Due: see orders and patient instructions/AVS.  Recommended screening schedule for the next 5-10 years is provided to the patient in written form: see Patient Instructions/AVS.    Cologuard UTD. Tdap UTD. He declines flu vaccine covid booster. PSA UTD. Return in 6 months (on 5/17/2023). Subjective     Patient's complete Health Risk Assessment and screening values have been reviewed and are found in Flowsheets. The following problems were reviewed today and where indicated follow up appointments were made and/or referrals ordered.     Positive Risk Factor Screenings with Interventions:             General Health and ACP:  General  In general, how would you say your health is?: Very Good  In the past 7 days, have you experienced any of the following: New or Increased Pain, New or Increased Fatigue, Loneliness, Social Isolation, Stress or Anger?: No  Do you get the social and emotional support that you need?: Yes  Do you have a Living Will?: (!) No    Advance Directives       Power of  Living Will ACP-Advance Directive ACP-Power of     Not on File Not on File Not on File Not on File        General Health Risk Interventions:  Declines        Safety:  Do you have either shower bars, grab bars, non-slip mats or non-slip surfaces in your shower or bathtub?: (!) No  Safety Interventions:  Home safety tips provided           Objective   Vitals:    11/17/22 1021   BP: 120/68   Pulse: 54   Temp: 97.5 °F (36.4 °C)   SpO2: 99%   Weight: 171 lb (77.6 kg)   Height: 5' 10\" (1.778 m)      Body mass index is 24.54 kg/m². General Appearance: alert and oriented to person, place and time, well developed and well- nourished, in no acute distress  Skin: warm and dry, no rash or erythema  Head: normocephalic and atraumatic  Eyes: pupils equal, round, and reactive to light, extraocular eye movements intact, conjunctivae normal  ENT: tympanic membrane, external ear and ear canal normal bilaterally, nose without deformity, nasal mucosa and turbinates normal without polyps  Neck: supple and non-tender without mass, no thyromegaly or thyroid nodules, no cervical lymphadenopathy  Pulmonary/Chest: clear to auscultation bilaterally- no wheezes, rales or rhonchi, normal air movement, no respiratory distress  Cardiovascular: normal rate, regular rhythm, normal S1 and S2, no murmurs, rubs, clicks, or gallops, distal pulses intact, no carotid bruits  Abdomen: soft, non-tender, non-distended, normal bowel sounds, no masses or organomegaly  Extremities: no cyanosis, clubbing or edema  Musculoskeletal: normal range of motion, no joint swelling, deformity or tenderness  Neurologic: reflexes normal and symmetric, no cranial nerve deficit, gait, coordination and speech normal       Allergies   Allergen Reactions    Adhesive Tape     Dexamethasone      Severe hiccups placed in hospital for 3 days    Celebrex [Celecoxib] Rash     Prior to Visit Medications    Medication Sig Taking?  Authorizing Provider   rosuvastatin (CRESTOR) 10 MG tablet TAKE 1 TABLET DAILY Yes Maryuri Villarreal MD   hyoscyamine (ANASPAZ;LEVSIN) 125 MCG tablet TAKE 1 TABLET BY MOUTH EVERY DAY Yes Angeline Barron MD   diphenoxylate-atropine (LOMOTIL) 2.5-0.025 MG per tablet TAKE 1 TABLET BY MOUTH TWICE A DAY Yes Layla Salt Ravindra Sam MD   losartan-hydroCHLOROthiazide [facility]) 100-12.5 MG per tablet TAKE 1 TABLET DAILY Yes Patricia Spring MD   metoprolol succinate (TOPROL XL) 50 MG extended release tablet TAKE 1 TABLET DAILY Yes Patricia Spring MD   buPROPion [facility] SR) 150 MG extended release tablet TAKE 1 TABLET DAILY Yes Patricia Spring MD   esomeprazole (Zando) 40 MG delayed release capsule Take 1 capsule by mouth every morning (before breakfast)  Patient taking differently: Take 20 mg by mouth every morning (before breakfast) OTC Yes OMAR Larios   clotrimazole-betamethasone (LOTRISONE) 1-0.05 % cream APPLY TO AFFECTED AREA  2 TO 3 TIMES PER DAY Yes Patricia Spring MD   Ascorbic Acid (VITAMIN C) 500 MG tablet Take 500 mg by mouth daily. Yes Historical Provider, MD   vitamin B-12 (CYANOCOBALAMIN) 100 MCG tablet Take 50 mcg by mouth daily. Yes Historical Provider, MD   aspirin 81 MG EC tablet Take 81 mg by mouth daily. Yes Historical Provider, MD   PHENobarbital (LUMINAL) 16.2 MG tablet TAKE 1 TABLET BY MOUTH EVERY DAY  Patricia Spring MD   triamcinolone (ARISTOCORT) 0.5 % cream Apply topically 3 times daily.   Patricia Spring MD   Respiratory Therapy Supplies (ONE FLOW SPIROMETER) CHE 1 Act by Does not apply route 4 times daily  Patricia Spring MD       CareTeam (Including outside providers/suppliers regularly involved in providing care):   Patient Care Team:  Patricia Spring MD as PCP - General (Family Medicine)  Patricia Spring MD as PCP - [facility] Empaneled Provider     Reviewed and updated this visit:  Tobacco  Allergies  Meds  Med Hx  Surg Hx  Soc Hx  Fam Hx

## 2022-11-17 NOTE — PROGRESS NOTES
[facility] PHYSICIAN SERVICES  [facility]  [id number] [place] [address] [address] [place] [address]  Dept: [phone]  Dept Fax: [phone]  Loc: [phone]     Cheyenne Lomas is a 78 y.o. male who presents today for his medical conditions/complaintsas noted below. Cheyenne Lomas is c/o of Medicare AWV        HPI:   Patient is here for AWV and follow up. He fell off ladder and landed on feet about 1 month ago. He states left medial ankle with tingling. No swelling now, had swelling in ankle the next day after episode. Tingling is intermittent left arm also in left lateral hip, thigh and medial ankle. He hasn't smoked in 3 months. IBS stable. Hypertension  Compliant with medications. No adverse effects from medication. No lightheadedness, palpitations, or chest pain. Hyperlipidemia  Tolerating current cholesterol medication without side effects. . Attempting to reduce processed sugar and cholesterol from diet.     Lab Results   Component Value Date    CHOL 122 (L) 05/19/2021    CHOL 138 (L) 02/11/2020    CHOL 144 (L) 02/25/2019     Lab Results   Component Value Date    TRIG 150 (H) 05/19/2021    TRIG 126 02/11/2020    TRIG 163 (H) 02/25/2019     Lab Results   Component Value Date    HDL 38 (L) 05/19/2021    HDL 47 (L) 02/11/2020    HDL 43 (L) 02/25/2019     Lab Results   Component Value Date    LDLCALC 54 05/19/2021    LDLCALC 66 02/11/2020    LDLCALC 68 02/25/2019     No results found for: LABVLDL, VLDL  No results found for: [facility]  Lab Results   Component Value Date     05/19/2021    K 3.3 (L) 05/19/2021     05/19/2021    CO2 29 05/19/2021    BUN 12 05/19/2021    CREATININE 0.9 05/19/2021    GLUCOSE 82 05/19/2021    CALCIUM 9.1 05/19/2021    PROT 6.5 (L) 05/19/2021    LABALBU 3.8 05/19/2021    BILITOT 0.4 05/19/2021    ALKPHOS 81 05/19/2021    AST 14 05/19/2021    ALT 10 05/19/2021    LABGLOM >60 05/19/2021    GFRAA >59 05/19/2021           HPI    Past Medical History:   Diagnosis Date    GERD (gastroesophageal reflux disease)     Hyperlipidemia     Hypertension     IBS (irritable bowel syndrome)      Past Surgical History:   Procedure Laterality Date    APPENDECTOMY  1974    COLONOSCOPY      Dr. Cornelio Whyte    neck    RECTAL SURGERY  10/2011    Exc Inclusion Cyst Rt Buttock, Perianal Area, Fistulotomy, Ext Hemorrhoids    SKIN CANCER EXCISION  2011    back of neck - precancerous    UPPER GASTROINTESTINAL ENDOSCOPY      Dr. Baljit Hannah- Esophagous stretched.     UPPER GASTROINTESTINAL ENDOSCOPY N/A 2021    Dr Arabella [id number] appearing stricture noted in the distal esophagus, gastritis    UPPER GASTROINTESTINAL ENDOSCOPY N/A 2021    Dr Arabella [id number] appearing stricture noted in the distal esophagus, gastritis       Family History   Problem Relation Age of Onset    High Blood Pressure Mother     Alzheimer's Disease Mother     High Blood Pressure Father     Cancer Father         lung    Colon Cancer Neg Hx     Esophageal Cancer Neg Hx     Liver Cancer Neg Hx     Rectal Cancer Neg Hx     Stomach Cancer Neg Hx        Social History     Tobacco Use    Smoking status: Former     Packs/day: 0.50     Years: 30.00     Pack years: 15.00     Types: Cigarettes     Quit date: 2022     Years since quittin.5    Smokeless tobacco: Never   Substance Use Topics    Alcohol use: Not Currently     Comment: rarely      Current Outpatient Medications   Medication Sig Dispense Refill    rosuvastatin (CRESTOR) 10 MG tablet TAKE 1 TABLET DAILY 90 tablet 3    hyoscyamine (ANASPAZ;LEVSIN) 125 MCG tablet TAKE 1 TABLET BY MOUTH EVERY DAY 90 tablet 3    diphenoxylate-atropine (LOMOTIL) 2.5-0.025 MG per tablet TAKE 1 TABLET BY MOUTH TWICE A  tablet 0    losartan-hydroCHLOROthiazide (HYZAAR) 100-12.5 MG per tablet TAKE 1 TABLET DAILY 90 tablet 3    metoprolol succinate (TOPROL XL) 50 MG extended release tablet TAKE 1 TABLET DAILY 90 tablet 3    buPROPion (WELLBUTRIN SR) 150 MG extended release tablet TAKE 1 TABLET DAILY 90 tablet 3    esomeprazole (NEXIUM) 40 MG delayed release capsule Take 1 capsule by mouth every morning (before breakfast) (Patient taking differently: Take 20 mg by mouth every morning (before breakfast) OTC) 30 capsule 5    clotrimazole-betamethasone (LOTRISONE) 1-0.05 % cream APPLY TO AFFECTED AREA  2 TO 3 TIMES PER DAY 1 Tube 3    Ascorbic Acid (VITAMIN C) 500 MG tablet Take 500 mg by mouth daily. vitamin B-12 (CYANOCOBALAMIN) 100 MCG tablet Take 50 mcg by mouth daily. aspirin 81 MG EC tablet Take 81 mg by mouth daily. PHENobarbital (LUMINAL) 16.2 MG tablet TAKE 1 TABLET BY MOUTH EVERY DAY 90 tablet 0    triamcinolone (ARISTOCORT) 0.5 % cream Apply topically 3 times daily. 30 g 3    Respiratory Therapy Supplies (ONE FLOW SPIROMETER) CHE 1 Act by Does not apply route 4 times daily 1 each 0     No current facility-administered medications for this visit. Allergies   Allergen Reactions    Adhesive Tape     Dexamethasone      Severe hiccups placed in hospital for 3 days    Celebrex [Celecoxib] Rash       Health Maintenance   Topic Date Due    Pneumococcal 65+ years Vaccine (1 - PCV) Never done    Hepatitis C screen  Never done    COVID-19 Vaccine (2 - Booster for Joey series) 05/11/2021    Lipids  05/19/2022    Flu vaccine (1) 11/17/2023 (Originally 8/1/2022)    Depression Screen  11/17/2023    Colorectal Cancer Screen  05/27/2024    DTaP/Tdap/Td vaccine (2 - Td or Tdap) 03/30/2025    Shingles vaccine  Completed    AAA screen  Completed    Hepatitis A vaccine  Aged Out    Hib vaccine  Aged Out    Meningococcal (ACWY) vaccine  Aged Out       Subjective:     Review of Systems   Constitutional:  Negative for chills and fever. HENT:  Negative for congestion. Respiratory:  Negative for cough, chest tightness and shortness of breath.     Cardiovascular:  Negative for chest pain, palpitations and leg swelling. Gastrointestinal:  Negative for abdominal pain, anal bleeding, constipation, diarrhea and nausea. Genitourinary:  Negative for difficulty urinating. Musculoskeletal:  Positive for arthralgias. Psychiatric/Behavioral: Negative. See HPI for visit specific review of symptoms. All others negative      Objective:   /68   Pulse 54   Temp 97.5 °F (36.4 °C)   Ht 5' 10\" (1.778 m)   Wt 171 lb (77.6 kg)   SpO2 99%   BMI 24.54 kg/m²    Physical Exam  Constitutional:       Appearance: He is well-developed. He is not ill-appearing. Eyes:      Pupils: Pupils are equal, round, and reactive to light. Cardiovascular:      Rate and Rhythm: Normal rate and regular rhythm. Heart sounds: No murmur heard. No friction rub. Pulmonary:      Effort: Pulmonary effort is normal. No respiratory distress. Breath sounds: Normal breath sounds. No wheezing or rales. Abdominal:      General: Bowel sounds are normal. There is no distension. Palpations: Abdomen is soft. Tenderness: There is no abdominal tenderness. There is no guarding or rebound. Musculoskeletal:      Cervical back: Normal range of motion and neck supple. Neurological:      Mental Status: He is alert. Psychiatric:         Behavior: Behavior normal.         Lab Review   No results found for this or any previous visit (from the past 672 hour(s)). Assessment & Plan: The following diagnoses and conditions are stable withno further orders unless indicated:  Levi Hidalgo was seen today for medicare aw. Diagnoses and all orders for this visit:    Welcome to Medicare preventive visit  See other note. Essential (primary) hypertension  -     Comprehensive Metabolic Panel; Future  Blood pressure is stable. Continue current medications. Monitor ambulatory bp readings, if persistently >140/90, return to clinic. Coronary artery atheroma  Stable, continue same.      Familial hypercholesterolemia  - Lipid Panel; Future  Will check lipids. Encounter for prostate cancer screening  -     PSA Screening; Future  Check PSA. Chronic obstructive pulmonary disease, unspecified COPD type ([facility] [address]  Stable, continue same. Avoid allergic triggers. Irritable bowel syndrome, unspecified type    Continue same. Avoid food triggers. Check labs. Return in 6 months (on 5/17/2023). Discussed use, benefit, and side effects of prescribed medications. All patient questions answered. Pt voiced understanding. Reviewed health maintenance. Instructed to continue current medications, diet and exercise. Patient agreedwith treatment plan. Follow up as directed.

## 2022-12-01 RX ORDER — TRIAMCINOLONE ACETONIDE 5 MG/G
CREAM TOPICAL
Qty: 30 G | Refills: 3 | Status: SHIPPED | OUTPATIENT
Start: 2022-12-01

## 2022-12-07 DIAGNOSIS — K58.9 IRRITABLE BOWEL SYNDROME, UNSPECIFIED TYPE: ICD-10-CM

## 2022-12-07 NOTE — TELEPHONE ENCOUNTER
Jocelyn Rosenthal called to request a refill on his medication.       Last office visit : 11/17/2022   Next office visit : 5/25/2023     Requested Prescriptions     Pending Prescriptions Disp Refills    PHENobarbital (LUMINAL) 16.2 MG tablet [Pharmacy Med Name: PHENOBARBITAL 16.2 MG TABLET] 90 tablet 0     Sig: TAKE 1 TABLET BY MOUTH EVERY DAY            Jerzy León MA

## 2022-12-08 RX ORDER — PHENOBARBITAL 16.2 MG/1
TABLET ORAL
Qty: 90 TABLET | Refills: 0 | Status: SHIPPED | OUTPATIENT
Start: 2022-12-08 | End: 2023-03-08

## 2022-12-09 ASSESSMENT — ENCOUNTER SYMPTOMS
ABDOMINAL PAIN: 0
DIARRHEA: 0
SHORTNESS OF BREATH: 0
NAUSEA: 0
CHEST TIGHTNESS: 0
ANAL BLEEDING: 0
CONSTIPATION: 0
COUGH: 0

## 2022-12-30 RX ORDER — BUPROPION HYDROCHLORIDE 150 MG/1
TABLET, EXTENDED RELEASE ORAL
Qty: 90 TABLET | Refills: 3 | Status: SHIPPED | OUTPATIENT
Start: 2022-12-30

## 2022-12-30 NOTE — TELEPHONE ENCOUNTER
Kota Nuñez called to request a refill on his medication.       Last office visit : 11/17/2022   Next office visit : Visit date not found     Requested Prescriptions     Signed Prescriptions Disp Refills    buPROPion (WELLBUTRIN SR) 150 MG extended release tablet 90 tablet 3     Sig: TAKE 1 TABLET DAILY     Authorizing Provider: Ankita Moser     Ordering User: Anne-Marie Zarate LPN

## 2023-01-27 DIAGNOSIS — K58.0 IRRITABLE BOWEL SYNDROME WITH DIARRHEA: ICD-10-CM

## 2023-01-27 RX ORDER — DIPHENOXYLATE HYDROCHLORIDE AND ATROPINE SULFATE 2.5; .025 MG/1; MG/1
TABLET ORAL
Qty: 180 TABLET | Refills: 0 | Status: SHIPPED | OUTPATIENT
Start: 2023-01-27 | End: 2023-04-28

## 2023-01-27 NOTE — TELEPHONE ENCOUNTER
Gelacio Dilma called to request a refill on his medication. Last office visit : 11/17/2022   Next office visit : Visit date not found     Last UDS:   Amphetamine Screen, Urine   Date Value Ref Range Status   02/21/2022 neg  Final     Barbiturate Screen, Urine   Date Value Ref Range Status   02/21/2022 pos  Final     Benzodiazepine Screen, Urine   Date Value Ref Range Status   02/21/2022 neg  Final     Buprenorphine Urine   Date Value Ref Range Status   02/21/2022 neg  Final     Cocaine Metabolite Screen, Urine   Date Value Ref Range Status   02/21/2022 neg  Final     Gabapentin Screen, Urine   Date Value Ref Range Status   02/21/2022 neg  Final     MDMA, Urine   Date Value Ref Range Status   02/21/2022 neg  Final     Methamphetamine, Urine   Date Value Ref Range Status   02/21/2022 neg  Final     Opiate Scrn, Ur   Date Value Ref Range Status   02/21/2022 neg  Final     Oxycodone Screen, Ur   Date Value Ref Range Status   02/21/2022 neg  Final     PCP Screen, Urine   Date Value Ref Range Status   02/21/2022 neg  Final     Propoxyphene Screen, Urine   Date Value Ref Range Status   02/21/2022 neg  Final     THC Screen, Urine   Date Value Ref Range Status   02/21/2022 neg  Final     Tricyclic Antidepressants, Urine   Date Value Ref Range Status   02/21/2022 neg  Final       Last Dania Boop: 09/02/2022  Medication Contract: unknown   Last Fill: 09/2/2022    Requested Prescriptions     Pending Prescriptions Disp Refills    diphenoxylate-atropine (LOMOTIL) 2.5-0.025 MG per tablet 180 tablet 0     Sig: TAKE 1 TABLET BY MOUTH TWICE A DAY         Please approve or refuse this medication.    Abdullahi Baets LPN

## 2023-02-10 RX ORDER — METOPROLOL SUCCINATE 50 MG/1
TABLET, EXTENDED RELEASE ORAL
Qty: 90 TABLET | Refills: 3 | Status: SHIPPED | OUTPATIENT
Start: 2023-02-10

## 2023-03-09 DIAGNOSIS — K58.9 IRRITABLE BOWEL SYNDROME, UNSPECIFIED TYPE: ICD-10-CM

## 2023-03-12 ENCOUNTER — PATIENT MESSAGE (OUTPATIENT)
Dept: PRIMARY CARE CLINIC | Age: 68
End: 2023-03-12

## 2023-03-12 DIAGNOSIS — K58.9 IRRITABLE BOWEL SYNDROME, UNSPECIFIED TYPE: ICD-10-CM

## 2023-03-13 RX ORDER — PHENOBARBITAL 16.2 MG/1
TABLET ORAL
Qty: 90 TABLET | Refills: 0 | Status: SHIPPED | OUTPATIENT
Start: 2023-03-13 | End: 2023-06-11

## 2023-03-13 RX ORDER — PHENOBARBITAL 16.2 MG/1
TABLET ORAL
Qty: 90 TABLET | Refills: 0 | OUTPATIENT
Start: 2023-03-13

## 2023-03-13 NOTE — TELEPHONE ENCOUNTER
From: Ziyad Mobley  To: Dr. Rebecca Rivera  Sent: 3/12/2023 12:40 PM CDT  Subject: Phenobarbitol 16.2 mg.    Please send a new script for the above medication which is among the daily medications I take, Phenobarbitol 16.2 - to [facility]. The pharmacy has sent a request for a new script as well. I have none of this medication at this time. Thank you very much!

## 2023-03-13 NOTE — TELEPHONE ENCOUNTER
Robert Borden called to request a refill on his medication. Last office visit : Visit date not found   Next office visit : 5/25/2023     Last UDS:   Amphetamine Screen, Urine   Date Value Ref Range Status   02/21/2022 neg  Final     Barbiturate Screen, Urine   Date Value Ref Range Status   02/21/2022 pos  Final     Benzodiazepine Screen, Urine   Date Value Ref Range Status   02/21/2022 neg  Final     Buprenorphine Urine   Date Value Ref Range Status   02/21/2022 neg  Final     Cocaine Metabolite Screen, Urine   Date Value Ref Range Status   02/21/2022 neg  Final     Gabapentin Screen, Urine   Date Value Ref Range Status   02/21/2022 neg  Final     MDMA, Urine   Date Value Ref Range Status   02/21/2022 neg  Final     Methamphetamine, Urine   Date Value Ref Range Status   02/21/2022 neg  Final     Opiate Scrn, Ur   Date Value Ref Range Status   02/21/2022 neg  Final     Oxycodone Screen, Ur   Date Value Ref Range Status   02/21/2022 neg  Final     PCP Screen, Urine   Date Value Ref Range Status   02/21/2022 neg  Final     Propoxyphene Screen, Urine   Date Value Ref Range Status   02/21/2022 neg  Final     THC Screen, Urine   Date Value Ref Range Status   02/21/2022 neg  Final     Tricyclic Antidepressants, Urine   Date Value Ref Range Status   02/21/2022 neg  Final       Last Petty Redd: 3/13/23  Medication Contract: 2/22   Last Fill: 12/8/22    Requested Prescriptions     Pending Prescriptions Disp Refills    PHENobarbital (LUMINAL) 16.2 MG tablet 90 tablet 0     Sig: TAKE 1 TABLET BY MOUTH EVERY DAY         Please approve or refuse this medication.    Frank Smart MA

## 2023-03-16 RX ORDER — DICYCLOMINE HCL 20 MG
20 TABLET ORAL 4 TIMES DAILY
Qty: 120 TABLET | Refills: 2 | Status: SHIPPED | OUTPATIENT
Start: 2023-03-16 | End: 2023-04-15

## 2023-04-24 DIAGNOSIS — K58.9 IRRITABLE BOWEL SYNDROME, UNSPECIFIED TYPE: ICD-10-CM

## 2023-04-25 RX ORDER — DICYCLOMINE HCL 20 MG
TABLET ORAL
Qty: 360 TABLET | OUTPATIENT
Start: 2023-04-25

## 2023-04-25 RX ORDER — PHENOBARBITAL 16.2 MG/1
TABLET ORAL
Qty: 90 TABLET | Refills: 0 | OUTPATIENT
Start: 2023-04-25

## 2023-04-25 RX ORDER — PHENOBARBITAL 16.2 MG/1
TABLET ORAL
Qty: 90 TABLET | Refills: 0 | OUTPATIENT
Start: 2023-04-25 | End: 2023-07-23

## 2023-05-02 ENCOUNTER — TELEPHONE (OUTPATIENT)
Dept: PRIMARY CARE CLINIC | Age: 68
End: 2023-05-02

## 2023-05-02 DIAGNOSIS — K58.0 IRRITABLE BOWEL SYNDROME WITH DIARRHEA: ICD-10-CM

## 2023-05-02 RX ORDER — DIPHENOXYLATE HYDROCHLORIDE AND ATROPINE SULFATE 2.5; .025 MG/1; MG/1
TABLET ORAL
Qty: 180 TABLET | Refills: 0 | Status: SHIPPED | OUTPATIENT
Start: 2023-05-02 | End: 2023-08-01

## 2023-05-02 RX ORDER — DIPHENOXYLATE HYDROCHLORIDE AND ATROPINE SULFATE 2.5; .025 MG/1; MG/1
TABLET ORAL
Qty: 180 TABLET | Refills: 0 | OUTPATIENT
Start: 2023-05-02

## 2023-05-02 RX ORDER — DIPHENOXYLATE HYDROCHLORIDE AND ATROPINE SULFATE 2.5; .025 MG/1; MG/1
TABLET ORAL
Qty: 180 TABLET | Refills: 0 | Status: CANCELLED | OUTPATIENT
Start: 2023-05-02 | End: 2023-08-01

## 2023-05-02 NOTE — TELEPHONE ENCOUNTER
Valeriano Rodriguez called to request a refill on his medication.       Last office visit : 11/17/22  Next office visit : 5/2/2023     Requested Prescriptions     Pending Prescriptions Disp Refills    diphenoxylate-atropine (LOMOTIL) 2.5-0.025 MG per tablet 180 tablet 0     Sig: TAKE 1 TABLET BY MOUTH TWICE A DAY            Lalita Ball MA

## 2023-05-02 NOTE — TELEPHONE ENCOUNTER
Ashu Membreno called to request a refill on his medication. Last office visit : Visit date not found   Next office visit : 5/2/2023     Last UDS:   Amphetamine Screen, Urine   Date Value Ref Range Status   02/21/2022 neg  Final     Barbiturate Screen, Urine   Date Value Ref Range Status   02/21/2022 pos  Final     Benzodiazepine Screen, Urine   Date Value Ref Range Status   02/21/2022 neg  Final     Buprenorphine Urine   Date Value Ref Range Status   02/21/2022 neg  Final     Cocaine Metabolite Screen, Urine   Date Value Ref Range Status   02/21/2022 neg  Final     Gabapentin Screen, Urine   Date Value Ref Range Status   02/21/2022 neg  Final     MDMA, Urine   Date Value Ref Range Status   02/21/2022 neg  Final     Methamphetamine, Urine   Date Value Ref Range Status   02/21/2022 neg  Final     Opiate Scrn, Ur   Date Value Ref Range Status   02/21/2022 neg  Final     Oxycodone Screen, Ur   Date Value Ref Range Status   02/21/2022 neg  Final     PCP Screen, Urine   Date Value Ref Range Status   02/21/2022 neg  Final     Propoxyphene Screen, Urine   Date Value Ref Range Status   02/21/2022 neg  Final     THC Screen, Urine   Date Value Ref Range Status   02/21/2022 neg  Final     Tricyclic Antidepressants, Urine   Date Value Ref Range Status   02/21/2022 neg  Final       Last  Sabot: 3/13/23  Medication Contract: 9/2021   Last Fill: 1/27/23    Requested Prescriptions     Pending Prescriptions Disp Refills    diphenoxylate-atropine (LOMOTIL) 2.5-0.025 MG per tablet 180 tablet 0     Sig: TAKE 1 TABLET BY MOUTH TWICE A DAY                                   Please approve or refuse this medication.    Jay Schmidt LPN

## 2023-05-18 DIAGNOSIS — E78.01 FAMILIAL HYPERCHOLESTEROLEMIA: ICD-10-CM

## 2023-05-18 DIAGNOSIS — Z12.5 ENCOUNTER FOR PROSTATE CANCER SCREENING: ICD-10-CM

## 2023-05-18 DIAGNOSIS — I10 ESSENTIAL (PRIMARY) HYPERTENSION: ICD-10-CM

## 2023-05-18 LAB
ALBUMIN SERPL-MCNC: 3.9 G/DL (ref 3.5–5.2)
ALP SERPL-CCNC: 83 U/L (ref 40–130)
ALT SERPL-CCNC: 11 U/L (ref 5–41)
ANION GAP SERPL CALCULATED.3IONS-SCNC: 10 MMOL/L (ref 7–19)
AST SERPL-CCNC: 12 U/L (ref 5–40)
BILIRUB SERPL-MCNC: 0.4 MG/DL (ref 0.2–1.2)
BUN SERPL-MCNC: 9 MG/DL (ref 8–23)
CALCIUM SERPL-MCNC: 9.2 MG/DL (ref 8.8–10.2)
CHLORIDE SERPL-SCNC: 102 MMOL/L (ref 98–111)
CHOLEST SERPL-MCNC: 119 MG/DL (ref 160–199)
CO2 SERPL-SCNC: 29 MMOL/L (ref 22–29)
CREAT SERPL-MCNC: 1.1 MG/DL (ref 0.5–1.2)
GLUCOSE SERPL-MCNC: 92 MG/DL (ref 74–109)
HDLC SERPL-MCNC: 45 MG/DL (ref 55–121)
LDLC SERPL CALC-MCNC: 57 MG/DL
POTASSIUM SERPL-SCNC: 3.7 MMOL/L (ref 3.5–5)
PROT SERPL-MCNC: 6.4 G/DL (ref 6.6–8.7)
PSA SERPL-MCNC: 0.72 NG/ML (ref 0–4)
SODIUM SERPL-SCNC: 141 MMOL/L (ref 136–145)
TRIGL SERPL-MCNC: 84 MG/DL (ref 0–149)

## 2023-05-22 ENCOUNTER — OFFICE VISIT (OUTPATIENT)
Dept: PRIMARY CARE CLINIC | Age: 68
End: 2023-05-22
Payer: MEDICARE

## 2023-05-22 VITALS
HEIGHT: 70 IN | OXYGEN SATURATION: 98 % | WEIGHT: 168 LBS | BODY MASS INDEX: 24.05 KG/M2 | TEMPERATURE: 97.2 F | SYSTOLIC BLOOD PRESSURE: 110 MMHG | HEART RATE: 49 BPM | DIASTOLIC BLOOD PRESSURE: 68 MMHG

## 2023-05-22 DIAGNOSIS — E78.01 FAMILIAL HYPERCHOLESTEROLEMIA: ICD-10-CM

## 2023-05-22 DIAGNOSIS — I10 ESSENTIAL (PRIMARY) HYPERTENSION: ICD-10-CM

## 2023-05-22 DIAGNOSIS — K58.9 IRRITABLE BOWEL SYNDROME, UNSPECIFIED TYPE: ICD-10-CM

## 2023-05-22 DIAGNOSIS — F17.210 CIGARETTE NICOTINE DEPENDENCE WITHOUT COMPLICATION: Primary | ICD-10-CM

## 2023-05-22 DIAGNOSIS — M54.2 CERVICALGIA: ICD-10-CM

## 2023-05-22 DIAGNOSIS — J44.9 CHRONIC OBSTRUCTIVE PULMONARY DISEASE, UNSPECIFIED COPD TYPE (HCC): ICD-10-CM

## 2023-05-22 DIAGNOSIS — J84.10 LUNG GRANULOMA (HCC): ICD-10-CM

## 2023-05-22 PROCEDURE — 1036F TOBACCO NON-USER: CPT | Performed by: FAMILY MEDICINE

## 2023-05-22 PROCEDURE — 99214 OFFICE O/P EST MOD 30 MIN: CPT | Performed by: FAMILY MEDICINE

## 2023-05-22 PROCEDURE — 3078F DIAST BP <80 MM HG: CPT | Performed by: FAMILY MEDICINE

## 2023-05-22 PROCEDURE — 3074F SYST BP LT 130 MM HG: CPT | Performed by: FAMILY MEDICINE

## 2023-05-22 PROCEDURE — G8420 CALC BMI NORM PARAMETERS: HCPCS | Performed by: FAMILY MEDICINE

## 2023-05-22 PROCEDURE — G8427 DOCREV CUR MEDS BY ELIG CLIN: HCPCS | Performed by: FAMILY MEDICINE

## 2023-05-22 PROCEDURE — 1123F ACP DISCUSS/DSCN MKR DOCD: CPT | Performed by: FAMILY MEDICINE

## 2023-05-22 PROCEDURE — 3023F SPIROM DOC REV: CPT | Performed by: FAMILY MEDICINE

## 2023-05-22 PROCEDURE — 3017F COLORECTAL CA SCREEN DOC REV: CPT | Performed by: FAMILY MEDICINE

## 2023-05-22 RX ORDER — PHENOBARBITAL 16.2 MG/1
TABLET ORAL
Qty: 90 TABLET | Refills: 0 | Status: SHIPPED | OUTPATIENT
Start: 2023-05-22 | End: 2023-08-20

## 2023-05-22 RX ORDER — LOSARTAN POTASSIUM AND HYDROCHLOROTHIAZIDE 12.5; 1 MG/1; MG/1
1 TABLET ORAL DAILY
Qty: 90 TABLET | Refills: 3 | Status: SHIPPED | OUTPATIENT
Start: 2023-05-22 | End: 2023-08-20

## 2023-05-22 RX ORDER — TIZANIDINE 4 MG/1
4 TABLET ORAL 3 TIMES DAILY PRN
Qty: 30 TABLET | Refills: 2 | Status: SHIPPED | OUTPATIENT
Start: 2023-05-22 | End: 2023-06-21

## 2023-05-22 RX ORDER — LIDOCAINE 50 MG/G
1 PATCH TOPICAL DAILY
Qty: 30 PATCH | Refills: 0 | Status: SHIPPED | OUTPATIENT
Start: 2023-05-22 | End: 2023-06-21

## 2023-05-22 ASSESSMENT — PATIENT HEALTH QUESTIONNAIRE - PHQ9
2. FEELING DOWN, DEPRESSED OR HOPELESS: 0
SUM OF ALL RESPONSES TO PHQ QUESTIONS 1-9: 0
SUM OF ALL RESPONSES TO PHQ QUESTIONS 1-9: 0
SUM OF ALL RESPONSES TO PHQ9 QUESTIONS 1 & 2: 0
SUM OF ALL RESPONSES TO PHQ QUESTIONS 1-9: 0
SUM OF ALL RESPONSES TO PHQ QUESTIONS 1-9: 0
1. LITTLE INTEREST OR PLEASURE IN DOING THINGS: 0

## 2023-05-22 NOTE — PROGRESS NOTES
[address] [facility]  [address] [address] [address]  [address] [address]  Dept: [phone]  Dept Fax: [phone]  Loc: [phone]    Carmelo Erickson is a 80 y.o. male who presents today for his medical conditions/complaintsas noted below. Carmelo Erickson is c/o of 6 Month Follow-Up and Back Pain (Neck, Left shoulder, arm. Donjacki Aguirre off a camper last year and progressively gotten worse)        HPI:   HPI    Patient is here for follow up. He reports worsening neck pain in the past 2 months. He fell last year off camper, states worse after that. He has radiation with tingling down left arm. Mild numbness, transient. No recent injury. He reports radiation from neck to shoulder blade. Normal  strength. CT lungs due in July, quit smoking 8 months ago. He reports urinary hesitancy, no pain or dysuria. PSA wnl. He declines rectal exam.     COPD  Compliant with medications. No adverse effects from medication. Symptoms are stable today. Has chronic shortness of breath and cough but at baseline today. IBS stable.      Lab Results   Component Value Date    CHOL 119 (L) 05/18/2023    CHOL 122 (L) 05/19/2021    CHOL 138 (L) 02/11/2020     Lab Results   Component Value Date    TRIG 84 05/18/2023    TRIG 150 (H) 05/19/2021    TRIG 126 02/11/2020     Lab Results   Component Value Date    HDL 45 (L) 05/18/2023    HDL 38 (L) 05/19/2021    HDL 47 (L) 02/11/2020     Lab Results   Component Value Date    LDLCALC 57 05/18/2023    LDLCALC 54 05/19/2021    LDLCALC 66 02/11/2020     No results found for: LABVLDL, VLDL  No results found for: [facility]  Lab Results   Component Value Date     05/18/2023    K 3.7 05/18/2023     05/18/2023    CO2 29 05/18/2023    BUN 9 05/18/2023    CREATININE 1.1 05/18/2023    GLUCOSE 92 05/18/2023    CALCIUM 9.2 05/18/2023    PROT 6.4 (L) 05/18/2023    LABALBU 3.9 05/18/2023    BILITOT 0.4 05/18/2023    ALKPHOS 83 05/18/2023    AST 12

## 2023-05-26 PROBLEM — M54.2 CERVICALGIA: Status: ACTIVE | Noted: 2023-05-26

## 2023-05-26 ASSESSMENT — ENCOUNTER SYMPTOMS
DIARRHEA: 0
CONSTIPATION: 0
CHEST TIGHTNESS: 0
NAUSEA: 0
SHORTNESS OF BREATH: 0
ANAL BLEEDING: 0
ABDOMINAL PAIN: 0
COUGH: 0

## 2023-06-20 RX ORDER — DICYCLOMINE HCL 20 MG
TABLET ORAL
Qty: 360 TABLET | Refills: 0 | OUTPATIENT
Start: 2023-06-20 | End: 2023-09-18

## 2023-07-05 RX ORDER — BUPROPION HYDROCHLORIDE 150 MG/1
150 TABLET, EXTENDED RELEASE ORAL DAILY
Qty: 90 TABLET | Refills: 1 | Status: SHIPPED | OUTPATIENT
Start: 2023-07-05

## 2023-07-05 NOTE — TELEPHONE ENCOUNTER
Rangel Lopez called to request a refill on his medication.       Last office visit : 5/22/2023   Next office visit : 8/31/2023     Requested Prescriptions     Pending Prescriptions Disp Refills    buPROPion (WELLBUTRIN SR) 150 MG extended release tablet 90 tablet 3     Sig: Take 1 tablet by mouth daily            Hedy Chiu MA

## 2023-07-13 ENCOUNTER — HOSPITAL ENCOUNTER (OUTPATIENT)
Dept: GENERAL RADIOLOGY | Age: 68
Discharge: HOME | End: 2023-07-13
Payer: MEDICARE

## 2023-07-13 ENCOUNTER — HOSPITAL ENCOUNTER (OUTPATIENT)
Dept: CT IMAGING | Age: 68
Discharge: HOME | End: 2023-07-13
Payer: MEDICARE

## 2023-07-13 DIAGNOSIS — M54.2 CERVICALGIA: ICD-10-CM

## 2023-07-13 DIAGNOSIS — R91.1 LUNG NODULE: ICD-10-CM

## 2023-07-13 DIAGNOSIS — F17.210 CIGARETTE NICOTINE DEPENDENCE WITHOUT COMPLICATION: ICD-10-CM

## 2023-07-13 DIAGNOSIS — J44.9 CHRONIC OBSTRUCTIVE PULMONARY DISEASE, UNSPECIFIED COPD TYPE (HCC): ICD-10-CM

## 2023-07-13 PROCEDURE — 71250 CT THORAX DX C-: CPT

## 2023-07-13 PROCEDURE — 72050 X-RAY EXAM NECK SPINE 4/5VWS: CPT

## 2023-08-02 DIAGNOSIS — K58.0 IRRITABLE BOWEL SYNDROME WITH DIARRHEA: ICD-10-CM

## 2023-08-02 NOTE — TELEPHONE ENCOUNTER
Kwabena Españan called to request a refill on his medication. Last office visit : 5/22/2023   Next office visit : 8/2/2023     Last UDS:   Amphetamine Screen, Urine   Date Value Ref Range Status   02/21/2022 neg  Final     Barbiturate Screen, Urine   Date Value Ref Range Status   02/21/2022 pos  Final     Benzodiazepine Screen, Urine   Date Value Ref Range Status   02/21/2022 neg  Final     Buprenorphine Urine   Date Value Ref Range Status   02/21/2022 neg  Final     Cocaine Metabolite Screen, Urine   Date Value Ref Range Status   02/21/2022 neg  Final     Gabapentin Screen, Urine   Date Value Ref Range Status   02/21/2022 neg  Final     MDMA, Urine   Date Value Ref Range Status   02/21/2022 neg  Final     Methamphetamine, Urine   Date Value Ref Range Status   02/21/2022 neg  Final     Opiate Scrn, Ur   Date Value Ref Range Status   02/21/2022 neg  Final     Oxycodone Screen, Ur   Date Value Ref Range Status   02/21/2022 neg  Final     PCP Screen, Urine   Date Value Ref Range Status   02/21/2022 neg  Final     Propoxyphene Screen, Urine   Date Value Ref Range Status   02/21/2022 neg  Final     THC Screen, Urine   Date Value Ref Range Status   02/21/2022 neg  Final     Tricyclic Antidepressants, Urine   Date Value Ref Range Status   02/21/2022 neg  Final       Last Mario Alberto Albarran: 8/2/23  Medication Contract: Needs updated has appt 8/31   Last Fill: 5/2/23    Requested Prescriptions     Pending Prescriptions Disp Refills    diphenoxylate-atropine (LOMOTIL) 2.5-0.025 MG per tablet 180 tablet 1     Sig: TAKE 1 TABLET BY MOUTH TWICE A DAY         Please approve or refuse this medication.    Candi Bains MA

## 2023-08-02 NOTE — TELEPHONE ENCOUNTER
Kavita Patel called to request a refill on his medication.       Last office visit : 5/22/2023   Next office visit : 8/31/2023     Requested Prescriptions     Pending Prescriptions Disp Refills    dicyclomine (BENTYL) 20 MG tablet [Pharmacy Med Name: DICYCLOMINE 20 MG TABLET] 360 tablet 0     Sig: TAKE 1 TABLET BY MOUTH FOUR TIMES A DAY            Bhavna Hernandez MA

## 2023-08-03 RX ORDER — DIPHENOXYLATE HYDROCHLORIDE AND ATROPINE SULFATE 2.5; .025 MG/1; MG/1
TABLET ORAL
Qty: 180 TABLET | Refills: 1 | Status: SHIPPED | OUTPATIENT
Start: 2023-08-03 | End: 2023-11-01

## 2023-08-03 RX ORDER — DICYCLOMINE HCL 20 MG
TABLET ORAL
Qty: 360 TABLET | Refills: 0 | Status: SHIPPED | OUTPATIENT
Start: 2023-08-03 | End: 2023-11-01

## 2023-08-20 DIAGNOSIS — K58.0 IRRITABLE BOWEL SYNDROME WITH DIARRHEA: ICD-10-CM

## 2023-08-21 RX ORDER — HYOSCYAMINE SULFATE 0.125 MG
TABLET ORAL
Qty: 90 TABLET | Refills: 3 | Status: SHIPPED | OUTPATIENT
Start: 2023-08-21

## 2023-08-21 RX ORDER — ROSUVASTATIN CALCIUM 10 MG/1
TABLET, COATED ORAL
Qty: 90 TABLET | Refills: 3 | Status: SHIPPED | OUTPATIENT
Start: 2023-08-21

## 2023-08-22 RX ORDER — DIPHENOXYLATE HYDROCHLORIDE AND ATROPINE SULFATE 2.5; .025 MG/1; MG/1
TABLET ORAL
Qty: 180 TABLET | Refills: 1 | OUTPATIENT
Start: 2023-08-22 | End: 2023-11-18

## 2023-08-22 RX ORDER — ROSUVASTATIN CALCIUM 10 MG/1
TABLET, COATED ORAL
Qty: 90 TABLET | Refills: 3 | OUTPATIENT
Start: 2023-08-22

## 2023-08-31 ENCOUNTER — OFFICE VISIT (OUTPATIENT)
Dept: PRIMARY CARE CLINIC | Age: 68
End: 2023-08-31
Payer: MEDICARE

## 2023-08-31 VITALS
HEIGHT: 70 IN | BODY MASS INDEX: 23.91 KG/M2 | DIASTOLIC BLOOD PRESSURE: 74 MMHG | SYSTOLIC BLOOD PRESSURE: 118 MMHG | WEIGHT: 167 LBS | HEART RATE: 56 BPM | OXYGEN SATURATION: 98 % | TEMPERATURE: 97 F

## 2023-08-31 DIAGNOSIS — K21.9 GERD WITHOUT ESOPHAGITIS: ICD-10-CM

## 2023-08-31 DIAGNOSIS — J84.10 LUNG GRANULOMA (HCC): ICD-10-CM

## 2023-08-31 DIAGNOSIS — I10 ESSENTIAL (PRIMARY) HYPERTENSION: Primary | ICD-10-CM

## 2023-08-31 DIAGNOSIS — J44.9 CHRONIC OBSTRUCTIVE PULMONARY DISEASE, UNSPECIFIED COPD TYPE (HCC): ICD-10-CM

## 2023-08-31 DIAGNOSIS — K58.2 MIXED IRRITABLE BOWEL SYNDROME: ICD-10-CM

## 2023-08-31 DIAGNOSIS — E78.01 FAMILIAL HYPERCHOLESTEROLEMIA: ICD-10-CM

## 2023-08-31 DIAGNOSIS — K58.9 IRRITABLE BOWEL SYNDROME, UNSPECIFIED TYPE: ICD-10-CM

## 2023-08-31 PROCEDURE — G8427 DOCREV CUR MEDS BY ELIG CLIN: HCPCS | Performed by: FAMILY MEDICINE

## 2023-08-31 PROCEDURE — 3023F SPIROM DOC REV: CPT | Performed by: FAMILY MEDICINE

## 2023-08-31 PROCEDURE — G8420 CALC BMI NORM PARAMETERS: HCPCS | Performed by: FAMILY MEDICINE

## 2023-08-31 PROCEDURE — 1123F ACP DISCUSS/DSCN MKR DOCD: CPT | Performed by: FAMILY MEDICINE

## 2023-08-31 PROCEDURE — 3074F SYST BP LT 130 MM HG: CPT | Performed by: FAMILY MEDICINE

## 2023-08-31 PROCEDURE — 3017F COLORECTAL CA SCREEN DOC REV: CPT | Performed by: FAMILY MEDICINE

## 2023-08-31 PROCEDURE — 99214 OFFICE O/P EST MOD 30 MIN: CPT | Performed by: FAMILY MEDICINE

## 2023-08-31 PROCEDURE — 1036F TOBACCO NON-USER: CPT | Performed by: FAMILY MEDICINE

## 2023-08-31 PROCEDURE — 3078F DIAST BP <80 MM HG: CPT | Performed by: FAMILY MEDICINE

## 2023-08-31 RX ORDER — PHENOBARBITAL 16.2 MG/1
16.2 TABLET ORAL DAILY
Qty: 90 TABLET | Refills: 0 | Status: SHIPPED | OUTPATIENT
Start: 2023-08-31 | End: 2023-12-01

## 2023-08-31 NOTE — PROGRESS NOTES
[address] [facility]  [facility],[address]  [facility] [place] [address]  Dept: [phone]  Dept Fax: [phone]  Loc: [phone]     Sujata Moreau is a 80 y.o. male who presents today for his medical conditions/complaintsas noted below. Sujata Moreau is c/o of 3 Month Follow-Up        HPI:   Patient is here for follow up. Hypertension  Compliant with medications. No adverse effects from medication. No lightheadedness, palpitations, or chest pain. He stopped smoking over a year ago, doing well. 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 He gets numbness and tingling down bilateral arms. Stable chest CT, no change in liver lesions. Gastroesophageal Reflux Disease  Symptoms currently under control. Medication adequately controls symptoms. No hematochezia or melena. IBS stable, no daily abdominal pain on meds.      Lab Results   Component Value Date     05/18/2023    K 3.7 05/18/2023     05/18/2023    CO2 29 05/18/2023    BUN 9 05/18/2023    CREATININE 1.1 05/18/2023    GLUCOSE 92 05/18/2023    CALCIUM 9.2 05/18/2023    PROT 6.4 (L) 05/18/2023    LABALBU 3.9 05/18/2023    BILITOT 0.4 05/18/2023    ALKPHOS 83 05/18/2023    AST 12 05/18/2023    ALT 11 05/18/2023    LABGLOM >60 05/18/2023    GFRAA >59 05/19/2021       Lab Results   Component Value Date    CHOL 119 (L) 05/18/2023    TRIG 84 05/18/2023    HDL 45 (L) 05/18/2023    LDLCALC 57 05/18/2023       HPI    Past Medical History:   Diagnosis Date    GERD (gastroesophageal reflux disease)     Hyperlipidemia     Hypertension     IBS (irritable bowel syndrome)      Past Surgical History:   Procedure Laterality Date    APPENDECTOMY  1974    COLONOSCOPY  2012    Dr. Samuel Mcdonough    neck    RECTAL SURGERY  10/2011    Exc Inclusion Cyst Rt Buttock, Perianal Area, Fistulotomy, Ext Hemorrhoids    SKIN CANCER EXCISION  08/2011

## 2023-09-17 ASSESSMENT — ENCOUNTER SYMPTOMS
ABDOMINAL PAIN: 0
CHEST TIGHTNESS: 0
ANAL BLEEDING: 0
NAUSEA: 0
CONSTIPATION: 0
DIARRHEA: 0
COUGH: 0
SHORTNESS OF BREATH: 0

## 2023-10-29 ENCOUNTER — PATIENT MESSAGE (OUTPATIENT)
Dept: PRIMARY CARE CLINIC | Age: 68
End: 2023-10-29

## 2023-10-29 DIAGNOSIS — K58.0 IRRITABLE BOWEL SYNDROME WITH DIARRHEA: ICD-10-CM

## 2023-10-29 RX ORDER — DIPHENOXYLATE HYDROCHLORIDE AND ATROPINE SULFATE 2.5; .025 MG/1; MG/1
TABLET ORAL
Qty: 180 TABLET | Refills: 1 | Status: CANCELLED | OUTPATIENT
Start: 2023-10-29 | End: 2024-01-27

## 2023-10-30 RX ORDER — DIPHENOXYLATE HYDROCHLORIDE AND ATROPINE SULFATE 2.5; .025 MG/1; MG/1
TABLET ORAL
Qty: 180 TABLET | Refills: 1 | Status: SHIPPED | OUTPATIENT
Start: 2023-10-30 | End: 2024-01-28

## 2023-10-30 NOTE — TELEPHONE ENCOUNTER
Arthur Xena called to request a refill on his medication. Last office visit : 8/31/2023   Next office visit : 1/9/2024     Last UDS:   Amphetamine Screen, Urine   Date Value Ref Range Status   02/21/2022 neg  Final     Barbiturate Screen, Urine   Date Value Ref Range Status   02/21/2022 pos  Final     Benzodiazepine Screen, Urine   Date Value Ref Range Status   02/21/2022 neg  Final     Buprenorphine Urine   Date Value Ref Range Status   02/21/2022 neg  Final     Cocaine Metabolite Screen, Urine   Date Value Ref Range Status   02/21/2022 neg  Final     Gabapentin Screen, Urine   Date Value Ref Range Status   02/21/2022 neg  Final     MDMA, Urine   Date Value Ref Range Status   02/21/2022 neg  Final     Methamphetamine, Urine   Date Value Ref Range Status   02/21/2022 neg  Final     Opiate Scrn, Ur   Date Value Ref Range Status   02/21/2022 neg  Final     Oxycodone Screen, Ur   Date Value Ref Range Status   02/21/2022 neg  Final     PCP Screen, Urine   Date Value Ref Range Status   02/21/2022 neg  Final     Propoxyphene Screen, Urine   Date Value Ref Range Status   02/21/2022 neg  Final     THC Screen, Urine   Date Value Ref Range Status   02/21/2022 neg  Final     Tricyclic Antidepressants, Urine   Date Value Ref Range Status   02/21/2022 neg  Final       Last Karolina Willis: 8/2/23  Medication Contract: DUE notified pt   Last Fill: 8/2/23    Requested Prescriptions     Pending Prescriptions Disp Refills    diphenoxylate-atropine (LOMOTIL) 2.5-0.025 MG per tablet 180 tablet 1     Sig: TAKE 1 TABLET BY MOUTH TWICE A DAY         Please approve or refuse this medication.    Alondra Galvez MA

## 2023-10-30 NOTE — TELEPHONE ENCOUNTER
From: Helio Reid  To: Dr. Francisco Stevenson: 10/29/2023 12:49 PM CDT  Subject: Diaphenoxylate - atropine 2.5 - 0.025    I have requested the above medication to be refilled at [facility], across from [facility]. Thank you very much! Be well!

## 2023-11-29 ENCOUNTER — NURSE ONLY (OUTPATIENT)
Dept: PRIMARY CARE CLINIC | Age: 68
End: 2023-11-29

## 2023-11-29 DIAGNOSIS — Z79.899 HIGH RISK MEDICATION USE: Primary | ICD-10-CM

## 2023-12-28 RX ORDER — BUPROPION HYDROCHLORIDE 150 MG/1
150 TABLET, EXTENDED RELEASE ORAL DAILY
Qty: 30 TABLET | Refills: 0 | Status: SHIPPED | OUTPATIENT
Start: 2023-12-28

## 2023-12-28 NOTE — TELEPHONE ENCOUNTER
Jordan Canales called to request a refill on his medication.       Last office visit : 8/31/2023   Next office visit : 1/11/2024     Requested Prescriptions     Pending Prescriptions Disp Refills    buPROPion (WELLBUTRIN SR) 150 MG extended release tablet [Pharmacy Med Name: BUPROPION HCL  MG TABLET] 90 tablet 1     Sig: TAKE 1 TABLET BY MOUTH EVERY DAY            Kuldeep Husain LPN

## 2024-01-11 ENCOUNTER — OFFICE VISIT (OUTPATIENT)
Dept: PRIMARY CARE CLINIC | Age: 69
End: 2024-01-11
Payer: MEDICARE

## 2024-01-11 VITALS
TEMPERATURE: 97.2 F | HEART RATE: 47 BPM | DIASTOLIC BLOOD PRESSURE: 78 MMHG | WEIGHT: 162 LBS | SYSTOLIC BLOOD PRESSURE: 100 MMHG | HEIGHT: 70 IN | BODY MASS INDEX: 23.19 KG/M2 | OXYGEN SATURATION: 99 %

## 2024-01-11 DIAGNOSIS — I10 ESSENTIAL (PRIMARY) HYPERTENSION: ICD-10-CM

## 2024-01-11 DIAGNOSIS — Z12.5 SCREENING FOR PROSTATE CANCER: ICD-10-CM

## 2024-01-11 DIAGNOSIS — Z00.00 MEDICARE ANNUAL WELLNESS VISIT, SUBSEQUENT: Primary | ICD-10-CM

## 2024-01-11 DIAGNOSIS — N28.9 FUNCTION KIDNEY DECREASED: ICD-10-CM

## 2024-01-11 DIAGNOSIS — E78.01 FAMILIAL HYPERCHOLESTEROLEMIA: ICD-10-CM

## 2024-01-11 LAB
ALBUMIN SERPL-MCNC: 4.2 G/DL (ref 3.5–5.2)
ALP SERPL-CCNC: 89 U/L (ref 40–130)
ALT SERPL-CCNC: 17 U/L (ref 5–41)
ANION GAP SERPL CALCULATED.3IONS-SCNC: 11 MMOL/L (ref 7–19)
AST SERPL-CCNC: 17 U/L (ref 5–40)
BILIRUB SERPL-MCNC: 0.6 MG/DL (ref 0.2–1.2)
BUN SERPL-MCNC: 20 MG/DL (ref 8–23)
CALCIUM SERPL-MCNC: 9.8 MG/DL (ref 8.8–10.2)
CHLORIDE SERPL-SCNC: 102 MMOL/L (ref 98–111)
CHOLEST SERPL-MCNC: 133 MG/DL (ref 160–199)
CO2 SERPL-SCNC: 32 MMOL/L (ref 22–29)
CREAT SERPL-MCNC: 1.9 MG/DL (ref 0.5–1.2)
GLUCOSE SERPL-MCNC: 103 MG/DL (ref 74–109)
HDLC SERPL-MCNC: 39 MG/DL (ref 55–121)
LDLC SERPL CALC-MCNC: 71 MG/DL
POTASSIUM SERPL-SCNC: 4 MMOL/L (ref 3.5–5)
PROT SERPL-MCNC: 7.5 G/DL (ref 6.6–8.7)
SODIUM SERPL-SCNC: 145 MMOL/L (ref 136–145)
TRIGL SERPL-MCNC: 113 MG/DL (ref 0–149)

## 2024-01-11 PROCEDURE — G8484 FLU IMMUNIZE NO ADMIN: HCPCS | Performed by: NURSE PRACTITIONER

## 2024-01-11 PROCEDURE — 3074F SYST BP LT 130 MM HG: CPT | Performed by: NURSE PRACTITIONER

## 2024-01-11 PROCEDURE — 99213 OFFICE O/P EST LOW 20 MIN: CPT | Performed by: NURSE PRACTITIONER

## 2024-01-11 PROCEDURE — G0439 PPPS, SUBSEQ VISIT: HCPCS | Performed by: NURSE PRACTITIONER

## 2024-01-11 PROCEDURE — 1123F ACP DISCUSS/DSCN MKR DOCD: CPT | Performed by: NURSE PRACTITIONER

## 2024-01-11 PROCEDURE — 3017F COLORECTAL CA SCREEN DOC REV: CPT | Performed by: NURSE PRACTITIONER

## 2024-01-11 PROCEDURE — 3078F DIAST BP <80 MM HG: CPT | Performed by: NURSE PRACTITIONER

## 2024-01-11 RX ORDER — METOPROLOL SUCCINATE 50 MG/1
TABLET, EXTENDED RELEASE ORAL
Qty: 90 TABLET | Refills: 0 | Status: SHIPPED | OUTPATIENT
Start: 2024-01-11

## 2024-01-11 SDOH — ECONOMIC STABILITY: FOOD INSECURITY: WITHIN THE PAST 12 MONTHS, THE FOOD YOU BOUGHT JUST DIDN'T LAST AND YOU DIDN'T HAVE MONEY TO GET MORE.: NEVER TRUE

## 2024-01-11 SDOH — ECONOMIC STABILITY: INCOME INSECURITY: HOW HARD IS IT FOR YOU TO PAY FOR THE VERY BASICS LIKE FOOD, HOUSING, MEDICAL CARE, AND HEATING?: NOT HARD AT ALL

## 2024-01-11 SDOH — ECONOMIC STABILITY: HOUSING INSECURITY
IN THE LAST 12 MONTHS, WAS THERE A TIME WHEN YOU DID NOT HAVE A STEADY PLACE TO SLEEP OR SLEPT IN A SHELTER (INCLUDING NOW)?: NO

## 2024-01-11 SDOH — ECONOMIC STABILITY: FOOD INSECURITY: WITHIN THE PAST 12 MONTHS, YOU WORRIED THAT YOUR FOOD WOULD RUN OUT BEFORE YOU GOT MONEY TO BUY MORE.: NEVER TRUE

## 2024-01-11 ASSESSMENT — PATIENT HEALTH QUESTIONNAIRE - PHQ9
SUM OF ALL RESPONSES TO PHQ QUESTIONS 1-9: 0
SUM OF ALL RESPONSES TO PHQ QUESTIONS 1-9: 0
2. FEELING DOWN, DEPRESSED OR HOPELESS: 0
1. LITTLE INTEREST OR PLEASURE IN DOING THINGS: 0
SUM OF ALL RESPONSES TO PHQ QUESTIONS 1-9: 0
SUM OF ALL RESPONSES TO PHQ QUESTIONS 1-9: 0
SUM OF ALL RESPONSES TO PHQ9 QUESTIONS 1 & 2: 0

## 2024-01-11 ASSESSMENT — LIFESTYLE VARIABLES
HOW OFTEN DO YOU HAVE A DRINK CONTAINING ALCOHOL: NEVER
HOW MANY STANDARD DRINKS CONTAINING ALCOHOL DO YOU HAVE ON A TYPICAL DAY: PATIENT DOES NOT DRINK

## 2024-01-11 NOTE — PATIENT INSTRUCTIONS
benefits include a comprehensive review of your medical history including lifestyle, illnesses that may run in your family, and various assessments and screenings as appropriate.    After reviewing your medical record and screening and assessments performed today your provider may have ordered immunizations, labs, imaging, and/or referrals for you.  A list of these orders (if applicable) as well as your Preventive Care list are included within your After Visit Summary for your review.    Other Preventive Recommendations:    A preventive eye exam performed by an eye specialist is recommended every 1-2 years to screen for glaucoma; cataracts, macular degeneration, and other eye disorders.  A preventive dental visit is recommended every 6 months.  Try to get at least 150 minutes of exercise per week or 10,000 steps per day on a pedometer .  Order or download the FREE \"Exercise & Physical Activity: Your Everyday Guide\" from [employer] [place] [employer]. Call 1-504.778.8976 or search [employer] [place] [employer] online.  You need 3528-6947 mg of calcium and 0040-1956 IU of vitamin D per day. It is possible to meet your calcium requirement with diet alone, but a vitamin D supplement is usually necessary to meet this goal.  When exposed to the sun, use a sunscreen that protects against both UVA and UVB radiation with an SPF of 30 or greater. Reapply every 2 to 3 hours or after sweating, drying off with a towel, or swimming.  Always wear a seat belt when traveling in a car. Always wear a helmet when riding a bicycle or motorcycle.

## 2024-01-18 PROBLEM — N28.9 FUNCTION KIDNEY DECREASED: Status: ACTIVE | Noted: 2024-01-18

## 2024-01-18 ASSESSMENT — ENCOUNTER SYMPTOMS
SHORTNESS OF BREATH: 0
CHEST TIGHTNESS: 0
DIARRHEA: 0
ABDOMINAL PAIN: 0
NAUSEA: 0
VOMITING: 0
SORE THROAT: 0
COUGH: 0
COLOR CHANGE: 0

## 2024-01-19 NOTE — ASSESSMENT & PLAN NOTE
Patient reports stability of blood pressure while taking prescribed metoprolol. Blood pressure today in office was 100/78. Plans to continue current regimen.

## 2024-01-19 NOTE — ASSESSMENT & PLAN NOTE
Labs today reveal decreased renal function, in which patient has never had concerns in the past. He reports recent illness, and notes he was likely dehydrated last week when he was ill. Plans to repeat labs in the next 1-2 weeks.   .  Lab Results   Component Value Date     01/11/2024    K 4.0 01/11/2024     01/11/2024    CO2 32 (H) 01/11/2024    BUN 20 01/11/2024    CREATININE 1.9 (H) 01/11/2024    GLUCOSE 103 01/11/2024    CALCIUM 9.8 01/11/2024    PROT 7.5 01/11/2024    LABALBU 4.2 01/11/2024    BILITOT 0.6 01/11/2024    ALKPHOS 89 01/11/2024    AST 17 01/11/2024    ALT 17 01/11/2024    LABGLOM 38 (A) 01/11/2024    GFRAA >59 05/19/2021

## 2024-01-23 DIAGNOSIS — I10 ESSENTIAL (PRIMARY) HYPERTENSION: ICD-10-CM

## 2024-01-23 RX ORDER — BUPROPION HYDROCHLORIDE 150 MG/1
150 TABLET, EXTENDED RELEASE ORAL DAILY
Qty: 30 TABLET | Refills: 2 | Status: SHIPPED | OUTPATIENT
Start: 2024-01-23

## 2024-01-23 RX ORDER — METOPROLOL SUCCINATE 50 MG/1
TABLET, EXTENDED RELEASE ORAL
Qty: 90 TABLET | Refills: 0 | OUTPATIENT
Start: 2024-01-23

## 2024-01-23 NOTE — TELEPHONE ENCOUNTER
Ziyad Mobley called to request a refill on his medication.      Last office visit : 1/11/2024   Next office visit : 4/12/2024     Requested Prescriptions     Pending Prescriptions Disp Refills    buPROPion (WELLBUTRIN SR) 150 MG extended release tablet [Pharmacy Med Name: BUPROPION HCL  MG TABLET] 30 tablet 0     Sig: TAKE 1 TABLET BY MOUTH EVERY DAY            Shelley Rodriguez LPN

## 2024-02-22 DIAGNOSIS — N28.9 FUNCTION KIDNEY DECREASED: ICD-10-CM

## 2024-02-22 DIAGNOSIS — Z12.5 SCREENING FOR PROSTATE CANCER: ICD-10-CM

## 2024-02-22 LAB
ALBUMIN SERPL-MCNC: 4.2 G/DL (ref 3.5–5.2)
ALP SERPL-CCNC: 83 U/L (ref 40–130)
ALT SERPL-CCNC: 11 U/L (ref 5–41)
ANION GAP SERPL CALCULATED.3IONS-SCNC: 9 MMOL/L (ref 7–19)
AST SERPL-CCNC: 14 U/L (ref 5–40)
BILIRUB SERPL-MCNC: 0.4 MG/DL (ref 0.2–1.2)
BUN SERPL-MCNC: 11 MG/DL (ref 8–23)
CALCIUM SERPL-MCNC: 9.6 MG/DL (ref 8.8–10.2)
CHLORIDE SERPL-SCNC: 102 MMOL/L (ref 98–111)
CO2 SERPL-SCNC: 32 MMOL/L (ref 22–29)
CREAT SERPL-MCNC: 1.2 MG/DL (ref 0.5–1.2)
GLUCOSE SERPL-MCNC: 86 MG/DL (ref 74–109)
POTASSIUM SERPL-SCNC: 4 MMOL/L (ref 3.5–5)
PROT SERPL-MCNC: 6.6 G/DL (ref 6.6–8.7)
PSA SERPL-MCNC: 0.68 NG/ML (ref 0–4)
SODIUM SERPL-SCNC: 143 MMOL/L (ref 136–145)

## 2024-03-13 DIAGNOSIS — K58.9 IRRITABLE BOWEL SYNDROME, UNSPECIFIED TYPE: ICD-10-CM

## 2024-03-13 DIAGNOSIS — I10 ESSENTIAL (PRIMARY) HYPERTENSION: ICD-10-CM

## 2024-03-13 NOTE — TELEPHONE ENCOUNTER
Ziyad Mobley called to request a refill on his medication.      Last office visit : 1/11/2024   Next office visit : 4/12/2024     Requested Prescriptions     Pending Prescriptions Disp Refills    metoprolol succinate (TOPROL XL) 50 MG extended release tablet [Pharmacy Med Name: METOPROLOL SUCC ER 50 MG TAB] 90 tablet 1     Sig: TAKE 1 TABLET BY MOUTH EVERY DAY            Rani Macedo MA

## 2024-03-13 NOTE — TELEPHONE ENCOUNTER
Ziyad Mobley called to request a refill on his medication.      Last office visit : 1/11/2024   Next office visit : 4/12/2024 Appt with Talia    Requested Prescriptions     Pending Prescriptions Disp Refills    PHENobarbital 16.2 MG tablet [Pharmacy Med Name: PHENOBARBITAL 16.2 MG TABLET] 90 tablet      Sig: Take 1 tablet by mouth daily for 92 days.    triamcinolone (ARISTOCORT) 0.5 % cream [Pharmacy Med Name: TRIAMCINOLONE 0.5% CREAM] 30 g 3     Sig: APPLY TO AFFECTED AREA 3 TIMES A DAY            Maria Betts MA  
No

## 2024-03-13 NOTE — TELEPHONE ENCOUNTER
Ziyad Mobley called to request a refill on his medication.      Last office visit : 1/11/2024   Next office visit : 3/13/2024     Last UDS: 11/29/23  Amphetamine Screen, Urine   Date Value Ref Range Status   11/29/2023 neg  Final     Barbiturate Screen, Urine   Date Value Ref Range Status   11/29/2023 pos  Final     Benzodiazepine Screen, Urine   Date Value Ref Range Status   11/29/2023 neg  Final     Buprenorphine Urine   Date Value Ref Range Status   11/29/2023 neg  Final     Cocaine Metabolite Screen, Urine   Date Value Ref Range Status   11/29/2023 neg  Final     Gabapentin Screen, Urine   Date Value Ref Range Status   11/29/2023 neg  Final     MDMA, Urine   Date Value Ref Range Status   11/29/2023 neg  Final     Methamphetamine, Urine   Date Value Ref Range Status   11/29/2023 neg  Final     Opiate Scrn, Ur   Date Value Ref Range Status   11/29/2023 neg  Final     Oxycodone Screen, Ur   Date Value Ref Range Status   11/29/2023 neg  Final     PCP Screen, Urine   Date Value Ref Range Status   11/29/2023 neg  Final     Propoxyphene Screen, Urine   Date Value Ref Range Status   11/29/2023 neg  Final     THC Screen, Urine   Date Value Ref Range Status   11/29/2023 neg  Final     Tricyclic Antidepressants, Urine   Date Value Ref Range Status   11/29/2023 neg  Final       Last Ian: 03/13/24  Medication Contract: 01/11/24   Last Fill: 12/18/23    Requested Prescriptions     Pending Prescriptions Disp Refills    losartan-hydroCHLOROthiazide (HYZAAR) 100-12.5 MG per tablet [Pharmacy Med Name: LOSARTAN-HCTZ 100-12.5 MG TAB] 90 tablet 1     Sig: TAKE 1 TABLET BY MOUTH EVERY DAY    PHENobarbital 16.2 MG tablet [Pharmacy Med Name: PHENOBARBITAL 16.2 MG TABLET] 90 tablet 0     Sig: Take 1 tablet by mouth daily.         Please approve or refuse this medication.   Rani Macedo MA

## 2024-03-14 RX ORDER — TRIAMCINOLONE ACETONIDE 5 MG/G
CREAM TOPICAL
Qty: 30 G | Refills: 3 | Status: SHIPPED | OUTPATIENT
Start: 2024-03-14

## 2024-03-14 RX ORDER — PHENOBARBITAL 16.2 MG/1
16.2 TABLET ORAL DAILY
Qty: 90 TABLET | Refills: 0 | OUTPATIENT
Start: 2024-03-14

## 2024-03-14 RX ORDER — PHENOBARBITAL 16.2 MG/1
16.2 TABLET ORAL DAILY
Qty: 90 TABLET | OUTPATIENT
Start: 2024-03-14 | End: 2024-06-14

## 2024-03-14 RX ORDER — METOPROLOL SUCCINATE 50 MG/1
TABLET, EXTENDED RELEASE ORAL
Qty: 90 TABLET | Refills: 1 | Status: SHIPPED | OUTPATIENT
Start: 2024-03-14

## 2024-03-14 RX ORDER — LOSARTAN POTASSIUM AND HYDROCHLOROTHIAZIDE 12.5; 1 MG/1; MG/1
1 TABLET ORAL DAILY
Qty: 90 TABLET | Refills: 1 | OUTPATIENT
Start: 2024-03-14

## 2024-03-15 DIAGNOSIS — K58.9 IRRITABLE BOWEL SYNDROME, UNSPECIFIED TYPE: ICD-10-CM

## 2024-03-15 NOTE — TELEPHONE ENCOUNTER
----- Message from Ziyad Mobley sent at 3/15/2024 12:33 PM CDT -----  Regarding: Phenobarbitol  Contact: [phone]  The pharmacy has requested a new script for Phenobarbitol. I will need refills I.e. for a 90 day supply.     Thank you for following up as noted!  Ziyad Mobley

## 2024-03-15 NOTE — TELEPHONE ENCOUNTER
Ziyad COY Nikitacammie called to request a refill on his medication.      Last office visit : 1/11/2024   Next office visit : 4/12/2024     Last UDS:   Amphetamine Screen, Urine   Date Value Ref Range Status   11/29/2023 neg  Final     Barbiturate Screen, Urine   Date Value Ref Range Status   11/29/2023 pos  Final     Benzodiazepine Screen, Urine   Date Value Ref Range Status   11/29/2023 neg  Final     Buprenorphine Urine   Date Value Ref Range Status   11/29/2023 neg  Final     Cocaine Metabolite Screen, Urine   Date Value Ref Range Status   11/29/2023 neg  Final     Gabapentin Screen, Urine   Date Value Ref Range Status   11/29/2023 neg  Final     MDMA, Urine   Date Value Ref Range Status   11/29/2023 neg  Final     Methamphetamine, Urine   Date Value Ref Range Status   11/29/2023 neg  Final     Opiate Scrn, Ur   Date Value Ref Range Status   11/29/2023 neg  Final     Oxycodone Screen, Ur   Date Value Ref Range Status   11/29/2023 neg  Final     PCP Screen, Urine   Date Value Ref Range Status   11/29/2023 neg  Final     Propoxyphene Screen, Urine   Date Value Ref Range Status   11/29/2023 neg  Final     THC Screen, Urine   Date Value Ref Range Status   11/29/2023 neg  Final     Tricyclic Antidepressants, Urine   Date Value Ref Range Status   11/29/2023 neg  Final       Last Ian: 3/13/24  Medication Contract: 1/11/24   Last Fill: 12/18/23    Requested Prescriptions     Pending Prescriptions Disp Refills    PHENobarbital 16.2 MG tablet 90 tablet 0     Sig: Take 1 tablet by mouth daily for 92 days.                       Please approve or refuse this medication.   Shelley Rodriguez LPN

## 2024-03-18 DIAGNOSIS — K58.9 IRRITABLE BOWEL SYNDROME, UNSPECIFIED TYPE: ICD-10-CM

## 2024-03-19 RX ORDER — PHENOBARBITAL 16.2 MG/1
16.2 TABLET ORAL DAILY
Qty: 90 TABLET | Refills: 0 | OUTPATIENT
Start: 2024-03-19 | End: 2024-06-19

## 2024-03-19 RX ORDER — PHENOBARBITAL 16.2 MG/1
16.2 TABLET ORAL DAILY
Qty: 90 TABLET | Refills: 0 | OUTPATIENT
Start: 2024-03-19

## 2024-03-19 NOTE — TELEPHONE ENCOUNTER
Ziyad Mobley called to request a refill on his medication.      Last office visit : 1/11/2024   Next office visit : 4/12/2024     Requested Prescriptions     Pending Prescriptions Disp Refills    PHENobarbital 16.2 MG tablet [Pharmacy Med Name: PHENOBARBITAL 16.2 MG TABLET] 90 tablet 0     Sig: Take 1 tablet by mouth daily.            Shelley Rodriguez LPN

## 2024-03-20 RX ORDER — PHENOBARBITAL 16.2 MG/1
16.2 TABLET ORAL DAILY
Qty: 30 TABLET | Refills: 0 | Status: SHIPPED | OUTPATIENT
Start: 2024-03-20 | End: 2024-06-20

## 2024-03-21 RX ORDER — BUPROPION HYDROCHLORIDE 150 MG/1
150 TABLET, EXTENDED RELEASE ORAL DAILY
Qty: 30 TABLET | Refills: 2 | OUTPATIENT
Start: 2024-03-21

## 2024-03-21 NOTE — TELEPHONE ENCOUNTER
Ziyad Mobley called to request a refill on his medication.      Last office visit : 1/11/2024   Next office visit : 4/12/2024     Requested Prescriptions     Pending Prescriptions Disp Refills    buPROPion (WELLBUTRIN SR) 150 MG extended release tablet [Pharmacy Med Name: BUPROPION HCL  MG TABLET] 30 tablet 2     Sig: TAKE 1 TABLET BY MOUTH EVERY DAY            Shelley Rodriguez LPN

## 2024-04-12 ENCOUNTER — OFFICE VISIT (OUTPATIENT)
Dept: PRIMARY CARE CLINIC | Age: 69
End: 2024-04-12
Payer: MEDICARE

## 2024-04-12 VITALS
DIASTOLIC BLOOD PRESSURE: 80 MMHG | SYSTOLIC BLOOD PRESSURE: 116 MMHG | HEIGHT: 70 IN | HEART RATE: 47 BPM | TEMPERATURE: 97.1 F | BODY MASS INDEX: 22.73 KG/M2 | WEIGHT: 158.8 LBS | OXYGEN SATURATION: 99 %

## 2024-04-12 DIAGNOSIS — K58.9 IRRITABLE BOWEL SYNDROME, UNSPECIFIED TYPE: ICD-10-CM

## 2024-04-12 DIAGNOSIS — K58.0 IRRITABLE BOWEL SYNDROME WITH DIARRHEA: ICD-10-CM

## 2024-04-12 PROCEDURE — 3017F COLORECTAL CA SCREEN DOC REV: CPT | Performed by: NURSE PRACTITIONER

## 2024-04-12 PROCEDURE — 99214 OFFICE O/P EST MOD 30 MIN: CPT | Performed by: NURSE PRACTITIONER

## 2024-04-12 PROCEDURE — G8420 CALC BMI NORM PARAMETERS: HCPCS | Performed by: NURSE PRACTITIONER

## 2024-04-12 PROCEDURE — 1036F TOBACCO NON-USER: CPT | Performed by: NURSE PRACTITIONER

## 2024-04-12 PROCEDURE — 1123F ACP DISCUSS/DSCN MKR DOCD: CPT | Performed by: NURSE PRACTITIONER

## 2024-04-12 PROCEDURE — G8427 DOCREV CUR MEDS BY ELIG CLIN: HCPCS | Performed by: NURSE PRACTITIONER

## 2024-04-12 PROCEDURE — 3074F SYST BP LT 130 MM HG: CPT | Performed by: NURSE PRACTITIONER

## 2024-04-12 PROCEDURE — 3079F DIAST BP 80-89 MM HG: CPT | Performed by: NURSE PRACTITIONER

## 2024-04-12 RX ORDER — PHENOBARBITAL 16.2 MG/1
16.2 TABLET ORAL DAILY
Qty: 30 TABLET | Refills: 0 | Status: SHIPPED | OUTPATIENT
Start: 2024-04-12 | End: 2024-07-13

## 2024-04-12 RX ORDER — DIPHENOXYLATE HYDROCHLORIDE AND ATROPINE SULFATE 2.5; .025 MG/1; MG/1
TABLET ORAL
Qty: 180 TABLET | Refills: 1 | Status: SHIPPED | OUTPATIENT
Start: 2024-04-12 | End: 2024-09-24

## 2024-04-12 NOTE — PROGRESS NOTES
Mr.Paul ZBIGNIEW Mobley is a 69 y.o. male who presents today for  Chief Complaint   Patient presents with    3 Month Follow-Up       HPI:  Patient here today for follow up.     Colonoscopy 05/27/2021 with a 3 year recall- due    Labs in January-stable    Patient reports concerns about controlled prescriptions.  He states that previously he was prescribed a 90-day supply of his Lomotil as well as his phenobarbital for irritable bowel syndrome.  Explained that the Lomotil can be prescribed for 6 months at a time, but explained that the phenobarbital can only be prescribed for 30 days.  Patient voices understanding.  As previously noted this has been the only combination therapy that he has been proven as effective in treatment of his symptoms.  Most recent urine drug screen and Ian remain appropriate.    Review of Systems   Constitutional:  Negative for activity change and fever.   HENT:  Negative for congestion, ear pain and sore throat.    Respiratory:  Negative for cough, chest tightness and shortness of breath.    Cardiovascular:  Negative for chest pain.   Gastrointestinal:  Positive for abdominal pain, constipation and diarrhea. Negative for nausea and vomiting.   Genitourinary:  Negative for frequency and urgency.   Musculoskeletal:  Negative for arthralgias and myalgias.   Skin:  Negative for color change.   Neurological:  Negative for dizziness, weakness and numbness.   Psychiatric/Behavioral:  Negative for agitation. The patient is not nervous/anxious.        Past Medical History:   Diagnosis Date    GERD (gastroesophageal reflux disease)     Hyperlipidemia     Hypertension     IBS (irritable bowel syndrome)        Current Outpatient Medications   Medication Sig Dispense Refill    PHENobarbital 16.2 MG tablet Take 1 tablet by mouth daily for 92 days. 30 tablet 0    diphenoxylate-atropine (LOMOTIL) 2.5-0.025 MG per tablet TAKE 1 TABLET BY MOUTH TWICE A  tablet 1    metoprolol succinate (TOPROL XL) 50 MG

## 2024-04-18 ASSESSMENT — ENCOUNTER SYMPTOMS
NAUSEA: 0
CHEST TIGHTNESS: 0
ABDOMINAL PAIN: 1
CONSTIPATION: 1
SORE THROAT: 0
DIARRHEA: 1
SHORTNESS OF BREATH: 0
COLOR CHANGE: 0
VOMITING: 0
COUGH: 0

## 2024-04-21 DIAGNOSIS — K58.9 IRRITABLE BOWEL SYNDROME, UNSPECIFIED TYPE: ICD-10-CM

## 2024-04-22 RX ORDER — PHENOBARBITAL 16.2 MG/1
16.2 TABLET ORAL DAILY
Qty: 30 TABLET | Refills: 0 | OUTPATIENT
Start: 2024-04-22

## 2024-04-22 NOTE — TELEPHONE ENCOUNTER
Ziyad COY Nikitacammie called to request a refill on his medication.      Last office visit : 4/12/2024   Next office visit : 7/17/2024     Last UDS:   Amphetamine Screen, Urine   Date Value Ref Range Status   11/29/2023 neg  Final     Barbiturate Screen, Urine   Date Value Ref Range Status   11/29/2023 pos  Final     Benzodiazepine Screen, Urine   Date Value Ref Range Status   11/29/2023 neg  Final     Buprenorphine Urine   Date Value Ref Range Status   11/29/2023 neg  Final     Cocaine Metabolite Screen, Urine   Date Value Ref Range Status   11/29/2023 neg  Final     Gabapentin Screen, Urine   Date Value Ref Range Status   11/29/2023 neg  Final     MDMA, Urine   Date Value Ref Range Status   11/29/2023 neg  Final     Methamphetamine, Urine   Date Value Ref Range Status   11/29/2023 neg  Final     Opiate Scrn, Ur   Date Value Ref Range Status   11/29/2023 neg  Final     Oxycodone Screen, Ur   Date Value Ref Range Status   11/29/2023 neg  Final     PCP Screen, Urine   Date Value Ref Range Status   11/29/2023 neg  Final     Propoxyphene Screen, Urine   Date Value Ref Range Status   11/29/2023 neg  Final     THC Screen, Urine   Date Value Ref Range Status   11/29/2023 neg  Final     Tricyclic Antidepressants, Urine   Date Value Ref Range Status   11/29/2023 neg  Final       Last Ian:   Medication Contract:    Last Fill: 4/12/24    Requested Prescriptions     Pending Prescriptions Disp Refills    PHENobarbital 16.2 MG tablet [Pharmacy Med Name: PHENOBARBITAL 16.2 MG TABLET] 30 tablet 0     Sig: Take 1 tablet by mouth daily.         Please approve or refuse this medication.   Shelley Rodriguez LPN

## 2024-05-22 DIAGNOSIS — K58.9 IRRITABLE BOWEL SYNDROME, UNSPECIFIED TYPE: ICD-10-CM

## 2024-05-22 RX ORDER — LOSARTAN POTASSIUM AND HYDROCHLOROTHIAZIDE 12.5; 1 MG/1; MG/1
1 TABLET ORAL DAILY
Qty: 90 TABLET | Refills: 0 | Status: SHIPPED | OUTPATIENT
Start: 2024-05-22

## 2024-05-22 RX ORDER — ROSUVASTATIN CALCIUM 10 MG/1
TABLET, COATED ORAL
Qty: 90 TABLET | Refills: 3 | OUTPATIENT
Start: 2024-05-22

## 2024-05-22 RX ORDER — HYOSCYAMINE SULFATE 0.125 MG
TABLET ORAL
Qty: 90 TABLET | Refills: 3 | OUTPATIENT
Start: 2024-05-22

## 2024-05-22 NOTE — TELEPHONE ENCOUNTER
Ziyad Mobley called to request a refill on his medication.      Last office visit : 4/12/2024   Next office visit : 5/22/2024     Requested Prescriptions     Pending Prescriptions Disp Refills    rosuvastatin (CRESTOR) 10 MG tablet [Pharmacy Med Name: ROSUVASTATIN CALCIUM 10 MG TAB] 90 tablet 3     Sig: TAKE 1 TABLET DAILY    losartan-hydroCHLOROthiazide (HYZAAR) 100-12.5 MG per tablet [Pharmacy Med Name: LOSARTAN-HCTZ 100-12.5 MG TAB] 90 tablet 0     Sig: TAKE 1 TABLET BY MOUTH EVERY DAY            Shelley Rodriguez LPN

## 2024-05-22 NOTE — TELEPHONE ENCOUNTER
Ziyad Mobley called to request a refill on his medication.      Last office visit : 4/12/2024   Next office visit : 5/22/2024     Requested Prescriptions     Pending Prescriptions Disp Refills    hyoscyamine (ANASPAZ;LEVSIN) 125 MCG tablet [Pharmacy Med Name: HYOSCYAMINE SULF 0.125 MG TAB] 90 tablet 3     Sig: TAKE 1 TABLET BY MOUTH EVERY DAY            Shelley Rodriguez LPN

## 2024-05-23 RX ORDER — PHENOBARBITAL 16.2 MG/1
16.2 TABLET ORAL DAILY
Qty: 30 TABLET | Refills: 0 | Status: SHIPPED | OUTPATIENT
Start: 2024-05-23 | End: 2024-06-22

## 2024-06-17 DIAGNOSIS — K58.0 IRRITABLE BOWEL SYNDROME WITH DIARRHEA: ICD-10-CM

## 2024-06-17 DIAGNOSIS — I10 ESSENTIAL (PRIMARY) HYPERTENSION: ICD-10-CM

## 2024-06-17 DIAGNOSIS — K58.9 IRRITABLE BOWEL SYNDROME, UNSPECIFIED TYPE: ICD-10-CM

## 2024-06-17 RX ORDER — PHENOBARBITAL 16.2 MG/1
16.2 TABLET ORAL DAILY
Qty: 30 TABLET | Refills: 0 | Status: CANCELLED | OUTPATIENT
Start: 2024-06-17 | End: 2024-07-17

## 2024-06-17 RX ORDER — ROSUVASTATIN CALCIUM 10 MG/1
TABLET, COATED ORAL
Qty: 30 TABLET | Refills: 0 | Status: SHIPPED | OUTPATIENT
Start: 2024-06-17

## 2024-06-17 RX ORDER — HYOSCYAMINE SULFATE 0.125 MG
125 TABLET ORAL DAILY
Qty: 90 TABLET | Refills: 3 | OUTPATIENT
Start: 2024-06-17

## 2024-06-17 NOTE — TELEPHONE ENCOUNTER
Ziyad Mobley called to request a refill on his medication.      Last office visit : 4/12/2024   Next office visit : 6/17/2024     Requested Prescriptions     Pending Prescriptions Disp Refills    losartan-hydroCHLOROthiazide (HYZAAR) 100-12.5 MG per tablet [Pharmacy Med Name: LOSARTAN-HCTZ 100-12.5 MG TAB] 90 tablet 1     Sig: TAKE 1 TABLET BY MOUTH EVERY DAY            Rani Macedo MA

## 2024-06-17 NOTE — TELEPHONE ENCOUNTER
Ziyad Mobley called to request a refill on his medication.      Last office visit : 4/12/2024   Next office visit : 6/17/2024     Requested Prescriptions     Pending Prescriptions Disp Refills    rosuvastatin (CRESTOR) 10 MG tablet [Pharmacy Med Name: ROSUVASTATIN CALCIUM 10 MG TAB] 30 tablet 0     Sig: TAKE 1 TABLET DAILY            Shelley Rodriguez LPN

## 2024-06-18 DIAGNOSIS — K58.9 IRRITABLE BOWEL SYNDROME, UNSPECIFIED TYPE: ICD-10-CM

## 2024-06-18 RX ORDER — PHENOBARBITAL 16.2 MG/1
16.2 TABLET ORAL DAILY
Qty: 30 TABLET | Refills: 0 | Status: SHIPPED | OUTPATIENT
Start: 2024-06-18 | End: 2024-07-18

## 2024-06-18 RX ORDER — LOSARTAN POTASSIUM AND HYDROCHLOROTHIAZIDE 12.5; 1 MG/1; MG/1
1 TABLET ORAL DAILY
Qty: 90 TABLET | Refills: 1 | Status: SHIPPED | OUTPATIENT
Start: 2024-06-18

## 2024-06-18 NOTE — TELEPHONE ENCOUNTER
Ziyad ZBIGNIEW Sheltoncammie called to request a refill on his medication.      Last office visit : 4/12/2024   Next office visit : 7/17/2024     Last UDS: 11/29/23  Benzodiazepine Screen, Urine   Date Value Ref Range Status   11/29/2023 neg  Final     Buprenorphine Urine   Date Value Ref Range Status   11/29/2023 neg  Final     Cocaine Metabolite Screen, Urine   Date Value Ref Range Status   11/29/2023 neg  Final     Gabapentin Screen, Urine   Date Value Ref Range Status   11/29/2023 neg  Final     MDMA, Urine   Date Value Ref Range Status   11/29/2023 neg  Final     Oxycodone Screen, Ur   Date Value Ref Range Status   11/29/2023 neg  Final     Propoxyphene Screen, Urine   Date Value Ref Range Status   11/29/2023 neg  Final     THC Screen, Urine   Date Value Ref Range Status   11/29/2023 neg  Final     Tricyclic Antidepressants, Urine   Date Value Ref Range Status   11/29/2023 neg  Final       Last Ian: 06/18/2024  Medication Contract: 01/11/24   Last Fill: 05/23/24    Requested Prescriptions     Pending Prescriptions Disp Refills    PHENobarbital 16.2 MG tablet 30 tablet 0     Sig: Take 1 tablet by mouth daily for 30 days. Max Daily Amount: 16.2 mg         Please approve or refuse this medication.   Rani Macedo MA

## 2024-06-19 RX ORDER — DIPHENOXYLATE HYDROCHLORIDE AND ATROPINE SULFATE 2.5; .025 MG/1; MG/1
TABLET ORAL
Qty: 180 TABLET | Refills: 0 | Status: SHIPPED | OUTPATIENT
Start: 2024-06-19 | End: 2024-11-29

## 2024-06-19 RX ORDER — METOPROLOL SUCCINATE 50 MG/1
50 TABLET, EXTENDED RELEASE ORAL DAILY
Qty: 90 TABLET | Refills: 0 | Status: SHIPPED | OUTPATIENT
Start: 2024-06-19

## 2024-06-19 RX ORDER — BUPROPION HYDROCHLORIDE 150 MG/1
150 TABLET, EXTENDED RELEASE ORAL DAILY
Qty: 90 TABLET | Refills: 0 | Status: SHIPPED | OUTPATIENT
Start: 2024-06-19

## 2024-06-19 RX ORDER — HYOSCYAMINE SULFATE 0.125 MG
TABLET ORAL
Qty: 90 TABLET | Refills: 3 | Status: SHIPPED | OUTPATIENT
Start: 2024-06-19

## 2024-06-28 ENCOUNTER — APPOINTMENT (OUTPATIENT)
Dept: GENERAL RADIOLOGY | Facility: HOSPITAL | Age: 69
End: 2024-06-28
Payer: MEDICARE

## 2024-06-28 PROCEDURE — 72100 X-RAY EXAM L-S SPINE 2/3 VWS: CPT

## 2024-07-16 DIAGNOSIS — K58.0 IRRITABLE BOWEL SYNDROME WITH DIARRHEA: ICD-10-CM

## 2024-07-16 DIAGNOSIS — K58.9 IRRITABLE BOWEL SYNDROME, UNSPECIFIED TYPE: ICD-10-CM

## 2024-07-16 RX ORDER — DIPHENOXYLATE HCL/ATROPINE 2.5-.025MG
TABLET ORAL
Qty: 180 TABLET | Refills: 0 | Status: CANCELLED | OUTPATIENT
Start: 2024-07-16 | End: 2024-12-26

## 2024-07-17 ENCOUNTER — OFFICE VISIT (OUTPATIENT)
Dept: PRIMARY CARE CLINIC | Age: 69
End: 2024-07-17

## 2024-07-17 VITALS
HEART RATE: 56 BPM | OXYGEN SATURATION: 94 % | DIASTOLIC BLOOD PRESSURE: 68 MMHG | TEMPERATURE: 97.2 F | WEIGHT: 154.4 LBS | HEIGHT: 70 IN | SYSTOLIC BLOOD PRESSURE: 116 MMHG | BODY MASS INDEX: 22.1 KG/M2

## 2024-07-17 DIAGNOSIS — Z12.5 SCREENING FOR PROSTATE CANCER: ICD-10-CM

## 2024-07-17 DIAGNOSIS — I10 ESSENTIAL (PRIMARY) HYPERTENSION: ICD-10-CM

## 2024-07-17 DIAGNOSIS — K58.0 IRRITABLE BOWEL SYNDROME WITH DIARRHEA: ICD-10-CM

## 2024-07-17 DIAGNOSIS — Z79.899 DRUG THERAPY: Primary | ICD-10-CM

## 2024-07-17 DIAGNOSIS — Z12.11 COLON CANCER SCREENING: ICD-10-CM

## 2024-07-17 DIAGNOSIS — Z13.220 SCREENING FOR HYPERLIPIDEMIA: ICD-10-CM

## 2024-07-17 RX ORDER — DIPHENOXYLATE HYDROCHLORIDE AND ATROPINE SULFATE 2.5; .025 MG/1; MG/1
TABLET ORAL
Qty: 180 TABLET | Refills: 0 | Status: CANCELLED | OUTPATIENT
Start: 2024-07-17 | End: 2024-12-27

## 2024-07-17 NOTE — TELEPHONE ENCOUNTER
Ziyad ZBIGNIEW Sheltoncammie called to request a refill on his medication.      Last office visit : 4/12/2024   Next office visit : 7/16/2024     Last UDS: 11/29/2023  Benzodiazepine Screen, Urine   Date Value Ref Range Status   11/29/2023 neg  Final     Buprenorphine Urine   Date Value Ref Range Status   11/29/2023 neg  Final     Cocaine Metabolite Screen, Urine   Date Value Ref Range Status   11/29/2023 neg  Final     Gabapentin Screen, Urine   Date Value Ref Range Status   11/29/2023 neg  Final     MDMA, Urine   Date Value Ref Range Status   11/29/2023 neg  Final     Oxycodone Screen, Ur   Date Value Ref Range Status   11/29/2023 neg  Final     Propoxyphene Screen, Urine   Date Value Ref Range Status   11/29/2023 neg  Final     THC Screen, Urine   Date Value Ref Range Status   11/29/2023 neg  Final     Tricyclic Antidepressants, Urine   Date Value Ref Range Status   11/29/2023 neg  Final       Last Ian: 07/17/2024  Medication Contract: 01/11/2024   Last Fill: 06/23/2024    Requested Prescriptions     Pending Prescriptions Disp Refills    PHENobarbital 16.2 MG tablet 30 tablet 0     Sig: Take 1 tablet by mouth daily for 30 days. Max Daily Amount: 16.2 mg         Please approve or refuse this medication.   Rani Macedo MA

## 2024-07-18 ENCOUNTER — NURSE ONLY (OUTPATIENT)
Dept: PRIMARY CARE CLINIC | Age: 69
End: 2024-07-18

## 2024-07-18 DIAGNOSIS — Z51.81 MEDICATION MONITORING ENCOUNTER: Primary | ICD-10-CM

## 2024-07-18 DIAGNOSIS — I10 ESSENTIAL (PRIMARY) HYPERTENSION: ICD-10-CM

## 2024-07-18 DIAGNOSIS — Z12.5 SCREENING FOR PROSTATE CANCER: ICD-10-CM

## 2024-07-18 DIAGNOSIS — Z13.220 SCREENING FOR HYPERLIPIDEMIA: ICD-10-CM

## 2024-07-18 LAB
ALBUMIN SERPL-MCNC: 3.8 G/DL (ref 3.5–5.2)
ALCOHOL URINE: ABNORMAL
ALP SERPL-CCNC: 83 U/L (ref 40–130)
ALT SERPL-CCNC: 11 U/L (ref 5–41)
AMPHETAMINE SCREEN URINE: ABNORMAL
ANION GAP SERPL CALCULATED.3IONS-SCNC: 9 MMOL/L (ref 7–19)
AST SERPL-CCNC: 15 U/L (ref 5–40)
BARBITURATE SCREEN URINE: ABNORMAL
BASOPHILS # BLD: 0 K/UL (ref 0–0.2)
BASOPHILS NFR BLD: 0.5 % (ref 0–1)
BENZODIAZEPINE SCREEN, URINE: ABNORMAL
BILIRUB SERPL-MCNC: 0.4 MG/DL (ref 0.2–1.2)
BUN SERPL-MCNC: 11 MG/DL (ref 8–23)
BUPRENORPHINE URINE: ABNORMAL
CALCIUM SERPL-MCNC: 9.2 MG/DL (ref 8.8–10.2)
CHLORIDE SERPL-SCNC: 102 MMOL/L (ref 98–111)
CHOLEST SERPL-MCNC: 122 MG/DL (ref 160–199)
CO2 SERPL-SCNC: 31 MMOL/L (ref 22–29)
COCAINE METABOLITE SCREEN URINE: ABNORMAL
CREAT SERPL-MCNC: 1.3 MG/DL (ref 0.5–1.2)
EOSINOPHIL # BLD: 0.2 K/UL (ref 0–0.6)
EOSINOPHIL NFR BLD: 2.7 % (ref 0–5)
ERYTHROCYTE [DISTWIDTH] IN BLOOD BY AUTOMATED COUNT: 11.9 % (ref 11.5–14.5)
FENTANYL SCREEN, URINE: ABNORMAL
GABAPENTIN SCREEN, URINE: ABNORMAL
GLUCOSE SERPL-MCNC: 97 MG/DL (ref 74–109)
HCT VFR BLD AUTO: 33.4 % (ref 42–52)
HDLC SERPL-MCNC: 46 MG/DL (ref 55–121)
HGB BLD-MCNC: 11.5 G/DL (ref 14–18)
IMM GRANULOCYTES # BLD: 0 K/UL
LDLC SERPL CALC-MCNC: 61 MG/DL
LYMPHOCYTES # BLD: 1.5 K/UL (ref 1.1–4.5)
LYMPHOCYTES NFR BLD: 25.6 % (ref 20–40)
MCH RBC QN AUTO: 31.1 PG (ref 27–31)
MCHC RBC AUTO-ENTMCNC: 34.4 G/DL (ref 33–37)
MCV RBC AUTO: 90.3 FL (ref 80–94)
MDMA URINE: ABNORMAL
METHADONE SCREEN, URINE: ABNORMAL
METHAMPHETAMINE, URINE: ABNORMAL
MONOCYTES # BLD: 0.4 K/UL (ref 0–0.9)
MONOCYTES NFR BLD: 6 % (ref 0–10)
NEUTROPHILS # BLD: 3.8 K/UL (ref 1.5–7.5)
NEUTS SEG NFR BLD: 64.9 % (ref 50–65)
OPIATE SCREEN URINE: ABNORMAL
OXYCODONE SCREEN URINE: ABNORMAL
PHENCYCLIDINE SCREEN URINE: ABNORMAL
PLATELET # BLD AUTO: 252 K/UL (ref 130–400)
PMV BLD AUTO: 10.8 FL (ref 9.4–12.4)
POTASSIUM SERPL-SCNC: 3.7 MMOL/L (ref 3.5–5)
PROPOXYPHENE SCREEN, URINE: ABNORMAL
PROT SERPL-MCNC: 6.4 G/DL (ref 6.6–8.7)
PSA SERPL-MCNC: 0.63 NG/ML (ref 0–4)
RBC # BLD AUTO: 3.7 M/UL (ref 4.7–6.1)
SODIUM SERPL-SCNC: 142 MMOL/L (ref 136–145)
SYNTHETIC CANNABINOIDS(K2) SCREEN, URINE: ABNORMAL
THC SCREEN, URINE: ABNORMAL
TRAMADOL SCREEN URINE: ABNORMAL
TRICYCLIC ANTIDEPRESSANTS, UR: ABNORMAL
TRIGL SERPL-MCNC: 73 MG/DL (ref 0–149)
WBC # BLD AUTO: 5.9 K/UL (ref 4.8–10.8)

## 2024-07-19 DIAGNOSIS — K58.9 IRRITABLE BOWEL SYNDROME, UNSPECIFIED TYPE: ICD-10-CM

## 2024-07-19 DIAGNOSIS — K58.0 IRRITABLE BOWEL SYNDROME WITH DIARRHEA: ICD-10-CM

## 2024-07-19 RX ORDER — PHENOBARBITAL 16.2 MG/1
16.2 TABLET ORAL DAILY
Qty: 30 TABLET | Refills: 0 | Status: SHIPPED | OUTPATIENT
Start: 2024-07-19 | End: 2024-08-18

## 2024-07-19 RX ORDER — PHENOBARBITAL 16.2 MG/1
16.2 TABLET ORAL DAILY
Qty: 30 TABLET | Refills: 0 | Status: SHIPPED | OUTPATIENT
Start: 2024-07-19 | End: 2024-07-19 | Stop reason: SDUPTHER

## 2024-07-19 RX ORDER — DIPHENOXYLATE HYDROCHLORIDE AND ATROPINE SULFATE 2.5; .025 MG/1; MG/1
TABLET ORAL
Qty: 180 TABLET | Refills: 0 | Status: SHIPPED | OUTPATIENT
Start: 2024-07-19 | End: 2024-12-29

## 2024-07-24 ASSESSMENT — ENCOUNTER SYMPTOMS
CHEST TIGHTNESS: 0
ABDOMINAL PAIN: 0
VOMITING: 0
SHORTNESS OF BREATH: 0
NAUSEA: 0
COUGH: 0
SORE THROAT: 0
COLOR CHANGE: 0
DIARRHEA: 0

## 2024-07-24 NOTE — PROGRESS NOTES
Mr.Paul ZBIGNIEW Mobley is a 69 y.o. male who presents today for  Chief Complaint   Patient presents with    3 Month Follow-Up       HPI:  Patient here today for follow-up.  He denies any current complaints or concerns.  His irritable bowel syndrome remains controlled on prescribed Lomotil and phenobarbital.    Patient is due for colorectal screening.  Cologuard last completed in May 2021.  He denies any current related concerns    Review of Systems   Constitutional:  Negative for activity change and fever.   HENT:  Negative for congestion, ear pain and sore throat.    Respiratory:  Negative for cough, chest tightness and shortness of breath.    Cardiovascular:  Negative for chest pain.   Gastrointestinal:  Negative for abdominal pain, diarrhea, nausea and vomiting.   Genitourinary:  Negative for frequency and urgency.   Musculoskeletal:  Negative for arthralgias and myalgias.   Skin:  Negative for color change.   Neurological:  Negative for dizziness, weakness and numbness.   Psychiatric/Behavioral:  Negative for agitation. The patient is not nervous/anxious.        Past Medical History:   Diagnosis Date    GERD (gastroesophageal reflux disease)     Hyperlipidemia     Hypertension     IBS (irritable bowel syndrome)        Current Outpatient Medications   Medication Sig Dispense Refill    hyoscyamine (ANASPAZ;LEVSIN) 125 MCG tablet TAKE 1 TABLET BY MOUTH EVERY DAY 90 tablet 3    buPROPion (WELLBUTRIN SR) 150 MG extended release tablet Take 1 tablet by mouth daily 90 tablet 0    metoprolol succinate (TOPROL XL) 50 MG extended release tablet Take 1 tablet by mouth daily 90 tablet 0    losartan-hydroCHLOROthiazide (HYZAAR) 100-12.5 MG per tablet TAKE 1 TABLET BY MOUTH EVERY DAY 90 tablet 1    rosuvastatin (CRESTOR) 10 MG tablet TAKE 1 TABLET DAILY 30 tablet 0    triamcinolone (ARISTOCORT) 0.5 % cream APPLY TO AFFECTED AREA 3 TIMES A DAY 30 g 3    clotrimazole-betamethasone (LOTRISONE) 1-0.05 % cream APPLY TO AFFECTED AREA  2

## 2024-08-23 RX ORDER — ROSUVASTATIN CALCIUM 10 MG/1
TABLET, COATED ORAL
Qty: 90 TABLET | Refills: 1 | Status: SHIPPED | OUTPATIENT
Start: 2024-08-23

## 2024-09-13 RX ORDER — BUPROPION HYDROCHLORIDE 150 MG/1
150 TABLET, EXTENDED RELEASE ORAL DAILY
Qty: 90 TABLET | Refills: 0 | Status: SHIPPED | OUTPATIENT
Start: 2024-09-13

## 2024-09-19 DIAGNOSIS — K58.9 IRRITABLE BOWEL SYNDROME, UNSPECIFIED TYPE: ICD-10-CM

## 2024-09-19 DIAGNOSIS — I10 ESSENTIAL (PRIMARY) HYPERTENSION: ICD-10-CM

## 2024-09-19 RX ORDER — METOPROLOL SUCCINATE 50 MG/1
50 TABLET, EXTENDED RELEASE ORAL DAILY
Qty: 90 TABLET | Refills: 0 | Status: SHIPPED | OUTPATIENT
Start: 2024-09-19

## 2024-09-19 RX ORDER — BUPROPION HYDROCHLORIDE 150 MG/1
150 TABLET, EXTENDED RELEASE ORAL DAILY
Qty: 90 TABLET | Refills: 0 | OUTPATIENT
Start: 2024-09-19

## 2024-09-20 RX ORDER — PHENOBARBITAL 16.2 MG/1
16.2 TABLET ORAL DAILY
Qty: 30 TABLET | Refills: 0 | Status: SHIPPED | OUTPATIENT
Start: 2024-09-20 | End: 2024-10-20

## 2024-10-16 DIAGNOSIS — Z87.891 FORMER SMOKER: Primary | ICD-10-CM

## 2024-10-17 ENCOUNTER — OFFICE VISIT (OUTPATIENT)
Dept: PRIMARY CARE CLINIC | Age: 69
End: 2024-10-17

## 2024-10-17 VITALS
HEIGHT: 70 IN | BODY MASS INDEX: 22.05 KG/M2 | WEIGHT: 154 LBS | OXYGEN SATURATION: 98 % | DIASTOLIC BLOOD PRESSURE: 64 MMHG | HEART RATE: 50 BPM | TEMPERATURE: 97.2 F | SYSTOLIC BLOOD PRESSURE: 118 MMHG

## 2024-10-17 DIAGNOSIS — J84.10 LUNG GRANULOMA (HCC): ICD-10-CM

## 2024-10-17 DIAGNOSIS — K58.0 IRRITABLE BOWEL SYNDROME WITH DIARRHEA: ICD-10-CM

## 2024-10-17 DIAGNOSIS — R21 RASH: ICD-10-CM

## 2024-10-17 DIAGNOSIS — K58.9 IRRITABLE BOWEL SYNDROME, UNSPECIFIED TYPE: Primary | Chronic | ICD-10-CM

## 2024-10-17 RX ORDER — TRIAMCINOLONE ACETONIDE 1 MG/G
OINTMENT TOPICAL
Qty: 15 G | Refills: 0 | Status: SHIPPED | OUTPATIENT
Start: 2024-10-17

## 2024-10-18 DIAGNOSIS — I10 ESSENTIAL (PRIMARY) HYPERTENSION: ICD-10-CM

## 2024-10-18 RX ORDER — PHENOBARBITAL 16.2 MG/1
16.2 TABLET ORAL DAILY
Qty: 30 TABLET | Refills: 0 | Status: SHIPPED | OUTPATIENT
Start: 2024-10-18 | End: 2024-11-17

## 2024-10-18 RX ORDER — DIPHENOXYLATE HCL/ATROPINE 2.5-.025MG
TABLET ORAL
Qty: 180 TABLET | Refills: 0 | Status: SHIPPED | OUTPATIENT
Start: 2024-10-18 | End: 2025-03-30

## 2024-10-18 ASSESSMENT — ENCOUNTER SYMPTOMS
VOMITING: 0
SHORTNESS OF BREATH: 0
NAUSEA: 0
COLOR CHANGE: 0
COUGH: 0
DIARRHEA: 0
CHEST TIGHTNESS: 0
SORE THROAT: 0
ABDOMINAL PAIN: 0

## 2024-10-18 NOTE — TELEPHONE ENCOUNTER
Ziyad Mobley called to request a refill on his medication.      Last office visit : 10/17/2024   Next office visit : 1/21/2025     Requested Prescriptions     Pending Prescriptions Disp Refills    rosuvastatin (CRESTOR) 10 MG tablet 90 tablet 1     Sig: TAKE 1 TABLET DAILY    metoprolol succinate (TOPROL XL) 50 MG extended release tablet 90 tablet 1     Sig: Take 1 tablet by mouth daily    losartan-hydroCHLOROthiazide (HYZAAR) 100-12.5 MG per tablet 90 tablet 1     Sig: Take 1 tablet by mouth daily    hyoscyamine (ANASPAZ;LEVSIN) 0.125 MG tablet 90 tablet 1     Sig: Take 1 tablet by mouth daily    buPROPion (WELLBUTRIN SR) 150 MG extended release tablet 90 tablet 1     Sig: Take 1 tablet by mouth daily            Rani Macedo MA

## 2024-10-18 NOTE — PROGRESS NOTES
Mr.Paul ZBIGNIEW Mobley is a 69 y.o. male who presents today for  Chief Complaint   Patient presents with    3 Month Follow-Up    Rash       HPI:    Patient here today for follow up. His irritable bowel syndrome has remained stable on prescribed Lomotil and Phenobarbital. He states that upon awakening this morning he was covered from the neck down in a erythematous rash. He denies any changes in soaps, detergents, or moisturizers. He states the rash in not painful and does not hurt. No recent ill symptoms.     Review of Systems   Constitutional:  Negative for activity change and fever.   HENT:  Negative for congestion, ear pain and sore throat.    Respiratory:  Negative for cough, chest tightness and shortness of breath.    Cardiovascular:  Negative for chest pain.   Gastrointestinal:  Negative for abdominal pain, diarrhea, nausea and vomiting.   Genitourinary:  Negative for frequency and urgency.   Musculoskeletal:  Negative for arthralgias and myalgias.   Skin:  Positive for rash. Negative for color change.   Neurological:  Negative for dizziness, weakness and numbness.   Psychiatric/Behavioral:  Negative for agitation. The patient is not nervous/anxious.        Past Medical History:   Diagnosis Date    GERD (gastroesophageal reflux disease)     Hyperlipidemia     Hypertension     IBS (irritable bowel syndrome)        Current Outpatient Medications   Medication Sig Dispense Refill    PHENobarbital 16.2 MG tablet Take 1 tablet by mouth daily for 30 days. Max Daily Amount: 16.2 mg 30 tablet 0    diphenoxylate-atropine (LOMOTIL) 2.5-0.025 MG per tablet TAKE 1 TABLET BY MOUTH TWICE A  tablet 0    triamcinolone (KENALOG) 0.1 % ointment Apply topically 2 times daily. 15 g 0    metoprolol succinate (TOPROL XL) 50 MG extended release tablet TAKE 1 TABLET BY MOUTH EVERY DAY 90 tablet 0    buPROPion (WELLBUTRIN SR) 150 MG extended release tablet TAKE 1 TABLET BY MOUTH EVERY DAY 90 tablet 0    rosuvastatin (CRESTOR) 10 MG

## 2024-10-22 RX ORDER — ROSUVASTATIN CALCIUM 10 MG/1
TABLET, COATED ORAL
Qty: 90 TABLET | Refills: 1 | Status: SHIPPED | OUTPATIENT
Start: 2024-10-22

## 2024-10-22 RX ORDER — HYOSCYAMINE SULFATE 0.125 MG
0.12 TABLET ORAL DAILY
Qty: 90 TABLET | Refills: 1 | Status: SHIPPED | OUTPATIENT
Start: 2024-10-22

## 2024-10-22 RX ORDER — BUPROPION HYDROCHLORIDE 150 MG/1
150 TABLET, EXTENDED RELEASE ORAL DAILY
Qty: 90 TABLET | Refills: 1 | Status: SHIPPED | OUTPATIENT
Start: 2024-10-22

## 2024-10-22 RX ORDER — LOSARTAN POTASSIUM AND HYDROCHLOROTHIAZIDE 12.5; 1 MG/1; MG/1
1 TABLET ORAL DAILY
Qty: 90 TABLET | Refills: 1 | Status: SHIPPED | OUTPATIENT
Start: 2024-10-22

## 2024-10-22 RX ORDER — METOPROLOL SUCCINATE 50 MG/1
50 TABLET, EXTENDED RELEASE ORAL DAILY
Qty: 90 TABLET | Refills: 1 | Status: SHIPPED | OUTPATIENT
Start: 2024-10-22

## 2024-11-21 ENCOUNTER — PATIENT MESSAGE (OUTPATIENT)
Dept: PRIMARY CARE CLINIC | Age: 69
End: 2024-11-21

## 2024-11-21 DIAGNOSIS — K58.9 IRRITABLE BOWEL SYNDROME, UNSPECIFIED TYPE: Chronic | ICD-10-CM

## 2024-11-21 NOTE — TELEPHONE ENCOUNTER
Ziyad ZBIGNIEW Sheltoncammie called to request a refill on his medication.      Last office visit : 10/17/2024   Next office visit : 1/21/2025     Last UDS: 07/18/24  Benzodiazepine Screen, Urine   Date Value Ref Range Status   11/29/2023 neg  Final     Buprenorphine Urine   Date Value Ref Range Status   11/29/2023 neg  Final     Cocaine Metabolite Screen, Urine   Date Value Ref Range Status   11/29/2023 neg  Final     Gabapentin Screen, Urine   Date Value Ref Range Status   11/29/2023 neg  Final     Oxycodone Screen, Ur   Date Value Ref Range Status   11/29/2023 neg  Final     Propoxyphene Screen, Urine   Date Value Ref Range Status   11/29/2023 neg  Final     THC Screen, Urine   Date Value Ref Range Status   11/29/2023 neg  Final     Tricyclic Antidepressants, Urine   Date Value Ref Range Status   11/29/2023 neg  Final       Last Ian: 11/21/2024  Medication Contract: 01/11/2024   Last Fill: 10/18/24    Requested Prescriptions     Pending Prescriptions Disp Refills    PHENobarbital 16.2 MG tablet [Pharmacy Med Name: PHENOBARBITAL 16.2 MG TABLET] 30 tablet 0     Sig: Take 1 tablet by mouth daily for 30 days. Max Daily Amount: 16.2 mg         Please approve or refuse this medication.   Rani Macedo MA

## 2024-11-22 RX ORDER — PHENOBARBITAL 16.2 MG/1
16.2 TABLET ORAL DAILY
Qty: 30 TABLET | Refills: 0 | Status: SHIPPED | OUTPATIENT
Start: 2024-11-22 | End: 2024-12-22

## 2024-12-17 ENCOUNTER — HOSPITAL ENCOUNTER (OUTPATIENT)
Dept: CT IMAGING | Age: 69
Discharge: HOME | End: 2024-12-17
Payer: MEDICARE

## 2024-12-17 DIAGNOSIS — Z87.891 FORMER SMOKER: ICD-10-CM

## 2024-12-17 PROCEDURE — 71271 CT THORAX LUNG CANCER SCR C-: CPT

## 2024-12-18 DIAGNOSIS — K58.9 IRRITABLE BOWEL SYNDROME, UNSPECIFIED TYPE: Chronic | ICD-10-CM

## 2024-12-18 NOTE — TELEPHONE ENCOUNTER
Ziyad COY Nikitacammie called to request a refill on his medication.      Last office visit : 10/17/2024   Next office visit : 1/21/2025     Last UDS:   Benzodiazepine Screen, Urine   Date Value Ref Range Status   11/29/2023 neg  Final     Buprenorphine Urine   Date Value Ref Range Status   11/29/2023 neg  Final     Cocaine Metabolite Screen, Urine   Date Value Ref Range Status   11/29/2023 neg  Final     Gabapentin Screen, Urine   Date Value Ref Range Status   11/29/2023 neg  Final     Oxycodone Screen, Ur   Date Value Ref Range Status   11/29/2023 neg  Final     Propoxyphene Screen, Urine   Date Value Ref Range Status   11/29/2023 neg  Final     THC Screen, Urine   Date Value Ref Range Status   11/29/2023 neg  Final     Tricyclic Antidepressants, Urine   Date Value Ref Range Status   11/29/2023 neg  Final       Last Ian: 12/18/24  Medication Contract: 1/11/24   Last Fill: 11/22/24    Requested Prescriptions     Pending Prescriptions Disp Refills    PHENobarbital 16.2 MG tablet 30 tablet 0     Sig: Take 1 tablet by mouth daily for 30 days. Max Daily Amount: 16.2 mg                       Please approve or refuse this medication.   Shelley Rodriguez LPN

## 2024-12-20 RX ORDER — PHENOBARBITAL 16.2 MG/1
16.2 TABLET ORAL DAILY
Qty: 30 TABLET | Refills: 0 | Status: SHIPPED | OUTPATIENT
Start: 2024-12-20 | End: 2025-01-19

## 2024-12-26 ENCOUNTER — PATIENT MESSAGE (OUTPATIENT)
Dept: PRIMARY CARE CLINIC | Age: 69
End: 2024-12-26

## 2025-01-17 DIAGNOSIS — K58.9 IRRITABLE BOWEL SYNDROME, UNSPECIFIED TYPE: Chronic | ICD-10-CM

## 2025-01-20 NOTE — TELEPHONE ENCOUNTER
Ziyad COY Nikitacammie called to request a refill on his medication.      Last office visit : 10/17/2024   Next office visit : 1/21/2025     Last UDS:   Benzodiazepine Screen, Urine   Date Value Ref Range Status   11/29/2023 neg  Final     Buprenorphine Urine   Date Value Ref Range Status   11/29/2023 neg  Final     Cocaine Metabolite Screen, Urine   Date Value Ref Range Status   11/29/2023 neg  Final     Gabapentin Screen, Urine   Date Value Ref Range Status   11/29/2023 neg  Final     Oxycodone Screen, Ur   Date Value Ref Range Status   11/29/2023 neg  Final     Propoxyphene Screen, Urine   Date Value Ref Range Status   11/29/2023 neg  Final     THC Screen, Urine   Date Value Ref Range Status   11/29/2023 neg  Final     Tricyclic Antidepressants, Urine   Date Value Ref Range Status   11/29/2023 neg  Final       Last Ian: 12/18/24  Medication Contract: 1/11/24   Last Fill: 12/20/24    Requested Prescriptions     Pending Prescriptions Disp Refills    PHENobarbital 16.2 MG tablet 30 tablet 0     Sig: Take 1 tablet by mouth daily for 30 days. Max Daily Amount: 16.2 mg                       Please approve or refuse this medication.   Shelley Rodriguez LPN

## 2025-01-21 ENCOUNTER — OFFICE VISIT (OUTPATIENT)
Dept: PRIMARY CARE CLINIC | Age: 70
End: 2025-01-21

## 2025-01-21 VITALS
WEIGHT: 150.6 LBS | OXYGEN SATURATION: 96 % | HEIGHT: 70 IN | HEART RATE: 76 BPM | TEMPERATURE: 97 F | DIASTOLIC BLOOD PRESSURE: 80 MMHG | BODY MASS INDEX: 21.56 KG/M2 | SYSTOLIC BLOOD PRESSURE: 110 MMHG

## 2025-01-21 DIAGNOSIS — K21.9 GERD WITHOUT ESOPHAGITIS: ICD-10-CM

## 2025-01-21 DIAGNOSIS — J84.10 LUNG GRANULOMA (HCC): ICD-10-CM

## 2025-01-21 DIAGNOSIS — Z79.899 DRUG THERAPY: Primary | ICD-10-CM

## 2025-01-21 DIAGNOSIS — K58.9 IRRITABLE BOWEL SYNDROME, UNSPECIFIED TYPE: ICD-10-CM

## 2025-01-21 DIAGNOSIS — I10 ESSENTIAL (PRIMARY) HYPERTENSION: ICD-10-CM

## 2025-01-21 DIAGNOSIS — E78.01 FAMILIAL HYPERCHOLESTEROLEMIA: ICD-10-CM

## 2025-01-21 LAB
ALCOHOL URINE: NORMAL
AMPHETAMINE SCREEN URINE: NORMAL
BARBITURATE SCREEN URINE: NORMAL
BENZODIAZEPINE SCREEN, URINE: NORMAL
BUPRENORPHINE URINE: NORMAL
COCAINE METABOLITE SCREEN URINE: NORMAL
FENTANYL SCREEN, URINE: NORMAL
GABAPENTIN SCREEN, URINE: NORMAL
MDMA, URINE: NORMAL
METHADONE SCREEN, URINE: NORMAL
METHAMPHETAMINE, URINE: NORMAL
OPIATE SCREEN URINE: NORMAL
OXYCODONE SCREEN URINE: NORMAL
PHENCYCLIDINE SCREEN URINE: NORMAL
PROPOXYPHENE SCREEN, URINE: NORMAL
SYNTHETIC CANNABINOIDS(K2) SCREEN, URINE: NORMAL
THC SCREEN, URINE: NORMAL
TRAMADOL SCREEN URINE: NORMAL
TRICYCLIC ANTIDEPRESSANTS, UR: NORMAL

## 2025-01-21 RX ORDER — PHENOBARBITAL 16.2 MG/1
16.2 TABLET ORAL DAILY
Qty: 30 TABLET | Refills: 0 | Status: SHIPPED | OUTPATIENT
Start: 2025-01-21 | End: 2025-02-20

## 2025-01-21 SDOH — ECONOMIC STABILITY: FOOD INSECURITY: WITHIN THE PAST 12 MONTHS, THE FOOD YOU BOUGHT JUST DIDN'T LAST AND YOU DIDN'T HAVE MONEY TO GET MORE.: NEVER TRUE

## 2025-01-21 SDOH — ECONOMIC STABILITY: FOOD INSECURITY: WITHIN THE PAST 12 MONTHS, YOU WORRIED THAT YOUR FOOD WOULD RUN OUT BEFORE YOU GOT MONEY TO BUY MORE.: NEVER TRUE

## 2025-01-21 ASSESSMENT — PATIENT HEALTH QUESTIONNAIRE - PHQ9
1. LITTLE INTEREST OR PLEASURE IN DOING THINGS: NOT AT ALL
SUM OF ALL RESPONSES TO PHQ QUESTIONS 1-9: 0
SUM OF ALL RESPONSES TO PHQ9 QUESTIONS 1 & 2: 0
2. FEELING DOWN, DEPRESSED OR HOPELESS: NOT AT ALL
SUM OF ALL RESPONSES TO PHQ QUESTIONS 1-9: 0

## 2025-01-27 NOTE — PROGRESS NOTES
Mr.Paul ZBIGNIEW Mobley is a 69 y.o. male who presents today for  Chief Complaint   Patient presents with    3 Month Follow-Up       HPI:  Patient here today for follow up. He reports he is doing well without any complaints or concerns. He states his current medication regimen remains successful in controlling his irritable bowel symptoms.     Review of Systems    Past Medical History:   Diagnosis Date    GERD (gastroesophageal reflux disease)     Hyperlipidemia     Hypertension     IBS (irritable bowel syndrome)        Current Outpatient Medications   Medication Sig Dispense Refill    rosuvastatin (CRESTOR) 10 MG tablet TAKE 1 TABLET DAILY 90 tablet 1    metoprolol succinate (TOPROL XL) 50 MG extended release tablet Take 1 tablet by mouth daily 90 tablet 1    losartan-hydroCHLOROthiazide (HYZAAR) 100-12.5 MG per tablet Take 1 tablet by mouth daily 90 tablet 1    hyoscyamine (ANASPAZ;LEVSIN) 0.125 MG tablet Take 1 tablet by mouth daily 90 tablet 1    buPROPion (WELLBUTRIN SR) 150 MG extended release tablet Take 1 tablet by mouth daily 90 tablet 1    diphenoxylate-atropine (LOMOTIL) 2.5-0.025 MG per tablet TAKE 1 TABLET BY MOUTH TWICE A  tablet 0    triamcinolone (KENALOG) 0.1 % ointment Apply topically 2 times daily. 15 g 0    triamcinolone (ARISTOCORT) 0.5 % cream APPLY TO AFFECTED AREA 3 TIMES A DAY 30 g 3    clotrimazole-betamethasone (LOTRISONE) 1-0.05 % cream APPLY TO AFFECTED AREA  2 TO 3 TIMES PER DAY 1 each 3    esomeprazole (NEXIUM) 40 MG delayed release capsule Take 1 capsule by mouth every morning (before breakfast) (Patient taking differently: Take 20 mg by mouth every morning (before breakfast) OTC) 30 capsule 5    Ascorbic Acid (VITAMIN C) 500 MG tablet Take 1 tablet by mouth daily      vitamin B-12 (CYANOCOBALAMIN) 100 MCG tablet Take 0.5 tablets by mouth daily      aspirin 81 MG EC tablet Take 1 tablet by mouth daily      PHENobarbital 16.2 MG tablet Take 1 tablet by mouth daily for 30 days. Max Daily

## 2025-01-31 DIAGNOSIS — K58.0 IRRITABLE BOWEL SYNDROME WITH DIARRHEA: ICD-10-CM

## 2025-01-31 RX ORDER — DIPHENOXYLATE HYDROCHLORIDE AND ATROPINE SULFATE 2.5; .025 MG/1; MG/1
TABLET ORAL
Qty: 180 TABLET | Refills: 0 | Status: CANCELLED | OUTPATIENT
Start: 2025-01-31 | End: 2025-07-13

## 2025-02-03 DIAGNOSIS — K58.0 IRRITABLE BOWEL SYNDROME WITH DIARRHEA: ICD-10-CM

## 2025-02-03 RX ORDER — DIPHENOXYLATE HYDROCHLORIDE AND ATROPINE SULFATE 2.5; .025 MG/1; MG/1
TABLET ORAL
Qty: 180 TABLET | Refills: 0 | Status: SHIPPED | OUTPATIENT
Start: 2025-02-03 | End: 2025-02-03 | Stop reason: SDUPTHER

## 2025-02-03 NOTE — TELEPHONE ENCOUNTER
Ziyad Mobley called to request a refill on his medication.    Called and cancelled previous prescription that was sent by me  Last office visit : 1/21/2025   Next office visit : 4/22/2025     Last UDS:   Benzodiazepine Screen, Urine   Date Value Ref Range Status   01/21/2025 neg  Final     Buprenorphine Urine   Date Value Ref Range Status   01/21/2025 neg  Final     Cocaine Metabolite Screen, Urine   Date Value Ref Range Status   01/21/2025 neg  Final     Gabapentin Screen, Urine   Date Value Ref Range Status   01/21/2025 not tested  Final     Oxycodone Screen, Ur   Date Value Ref Range Status   01/21/2025 neg  Final     Propoxyphene Screen, Urine   Date Value Ref Range Status   01/21/2025 neg  Final     THC Screen, Urine   Date Value Ref Range Status   01/21/2025 neg  Final     Tricyclic Antidepressants, Urine   Date Value Ref Range Status   01/21/2025 neg  Final       Last Ian: 12/18/25  Medication Contract:  1/21/24  Last Fill: 10/18/24    Requested Prescriptions     Pending Prescriptions Disp Refills    diphenoxylate-atropine (LOMOTIL) 2.5-0.025 MG per tablet 180 tablet 0     Sig: TAKE 1 TABLET BY MOUTH TWICE A DAY                       Please approve or refuse this medication.   Shelley Rodriguez LPN

## 2025-02-03 NOTE — TELEPHONE ENCOUNTER
Ziyad Mobley called to request a refill on his medication.      Last office visit : 1/21/2025   Next office visit : 4/22/2025     Requested Prescriptions     Pending Prescriptions Disp Refills    diphenoxylate-atropine (LOMOTIL) 2.5-0.025 MG per tablet [Pharmacy Med Name: DIPHENOXYLATE-ATROP 2.5-0.025] 180 tablet 0     Sig: TAKE 1 TABLET BY MOUTH TWICE A DAY            Shelley Rodriguez LPN

## 2025-02-05 RX ORDER — DIPHENOXYLATE HYDROCHLORIDE AND ATROPINE SULFATE 2.5; .025 MG/1; MG/1
TABLET ORAL
Qty: 180 TABLET | Refills: 0 | Status: SHIPPED | OUTPATIENT
Start: 2025-02-05 | End: 2025-04-02

## 2025-02-20 DIAGNOSIS — K58.9 IRRITABLE BOWEL SYNDROME, UNSPECIFIED TYPE: Chronic | ICD-10-CM

## 2025-02-21 ENCOUNTER — PATIENT MESSAGE (OUTPATIENT)
Dept: PRIMARY CARE CLINIC | Age: 70
End: 2025-02-21

## 2025-02-21 RX ORDER — PHENOBARBITAL 16.2 MG/1
16.2 TABLET ORAL DAILY
Qty: 30 TABLET | Refills: 0 | Status: SHIPPED | OUTPATIENT
Start: 2025-02-21 | End: 2025-03-23

## 2025-02-21 RX ORDER — PHENOBARBITAL 16.2 MG/1
16.2 TABLET ORAL DAILY
Qty: 30 TABLET | Refills: 0 | OUTPATIENT
Start: 2025-02-21 | End: 2025-03-23

## 2025-02-21 NOTE — TELEPHONE ENCOUNTER
Ziyad ZBIGNIEW Mobley called to request a refill on his medication.      Last office visit : 1/21/2025   Next office visit : 2/20/2025     Last UDS: 01/21/25  Benzodiazepine Screen, Urine   Date Value Ref Range Status   01/21/2025 neg  Final     Buprenorphine Urine   Date Value Ref Range Status   01/21/2025 neg  Final     Cocaine Metabolite Screen, Urine   Date Value Ref Range Status   01/21/2025 neg  Final     Gabapentin Screen, Urine   Date Value Ref Range Status   01/21/2025 not tested  Final     Oxycodone Screen, Ur   Date Value Ref Range Status   01/21/2025 neg  Final     Propoxyphene Screen, Urine   Date Value Ref Range Status   01/21/2025 neg  Final     THC Screen, Urine   Date Value Ref Range Status   01/21/2025 neg  Final     Tricyclic Antidepressants, Urine   Date Value Ref Range Status   01/21/2025 neg  Final       Last Ian: 02/21/2025  Medication Contract: 01/21/25   Last Fill: 01/21/25    Requested Prescriptions     Pending Prescriptions Disp Refills    PHENobarbital 16.2 MG tablet 30 tablet 0     Sig: Take 1 tablet by mouth daily for 30 days. Max Daily Amount: 16.2 mg         Please approve or refuse this medication.   Rani Macedo MA

## 2025-03-17 DIAGNOSIS — K58.9 IRRITABLE BOWEL SYNDROME, UNSPECIFIED TYPE: Chronic | ICD-10-CM

## 2025-03-18 NOTE — TELEPHONE ENCOUNTER
Ziyad ZBIGNIEW Sheltoncammie called to request a refill on his medication.      Last office visit : 1/21/2025   Next office visit : 4/22/2025     Last UDS: 01/21/25  Benzodiazepine Screen, Urine   Date Value Ref Range Status   01/21/2025 neg  Final     Buprenorphine Urine   Date Value Ref Range Status   01/21/2025 neg  Final     Cocaine Metabolite Screen, Urine   Date Value Ref Range Status   01/21/2025 neg  Final     Gabapentin Screen, Urine   Date Value Ref Range Status   01/21/2025 not tested  Final     Oxycodone Screen, Ur   Date Value Ref Range Status   01/21/2025 neg  Final     Propoxyphene Screen, Urine   Date Value Ref Range Status   01/21/2025 neg  Final     THC Screen, Urine   Date Value Ref Range Status   01/21/2025 neg  Final     Tricyclic Antidepressants, Urine   Date Value Ref Range Status   01/21/2025 neg  Final       Last Ian: 03/18/25  Medication Contract: 01/21/25   Last Fill: 02/21/25    Requested Prescriptions     Pending Prescriptions Disp Refills    PHENobarbital 16.2 MG tablet [Pharmacy Med Name: PHENOBARBITAL 16.2 MG TABLET] 30 tablet 0     Sig: Take 1 tablet by mouth daily for 30 days. Max Daily Amount: 16.2 mg         Please approve or refuse this medication.   Rani Macedo MA

## 2025-03-20 RX ORDER — PHENOBARBITAL 16.2 MG/1
16.2 TABLET ORAL DAILY
Qty: 30 TABLET | Refills: 0 | Status: SHIPPED | OUTPATIENT
Start: 2025-03-20 | End: 2025-04-19

## 2025-04-17 DIAGNOSIS — K58.9 IRRITABLE BOWEL SYNDROME, UNSPECIFIED TYPE: Chronic | ICD-10-CM

## 2025-04-18 RX ORDER — PHENOBARBITAL 16.2 MG/1
16.2 TABLET ORAL DAILY
Qty: 30 TABLET | Refills: 0 | Status: SHIPPED | OUTPATIENT
Start: 2025-04-18 | End: 2025-05-18

## 2025-04-18 NOTE — TELEPHONE ENCOUNTER
Ziyad ZBIGNIEW Mobley called to request a refill on his medication.      Last office visit : 1/21/2025   Next office visit : 4/22/2025     Last UDS: 01/21/2025  Benzodiazepine Screen, Urine   Date Value Ref Range Status   01/21/2025 neg  Final     Buprenorphine Urine   Date Value Ref Range Status   01/21/2025 neg  Final     Cocaine Metabolite Screen, Urine   Date Value Ref Range Status   01/21/2025 neg  Final     Gabapentin Screen, Urine   Date Value Ref Range Status   01/21/2025 not tested  Final     Oxycodone Screen, Ur   Date Value Ref Range Status   01/21/2025 neg  Final     Propoxyphene Screen, Urine   Date Value Ref Range Status   01/21/2025 neg  Final     THC Screen, Urine   Date Value Ref Range Status   01/21/2025 neg  Final     Tricyclic Antidepressants, Urine   Date Value Ref Range Status   01/21/2025 neg  Final       Last Ian: 04/18/2025  Medication Contract: 01/21/2025   Last Fill: 03/20/2025    Requested Prescriptions     Pending Prescriptions Disp Refills    PHENobarbital 16.2 MG tablet 30 tablet 0     Sig: Take 1 tablet by mouth daily for 30 days. Max Daily Amount: 16.2 mg         Please approve or refuse this medication.   Rani Macedo MA

## 2025-04-22 ENCOUNTER — OFFICE VISIT (OUTPATIENT)
Dept: PRIMARY CARE CLINIC | Age: 70
End: 2025-04-22
Payer: MEDICARE

## 2025-04-22 VITALS
TEMPERATURE: 97 F | WEIGHT: 140 LBS | HEART RATE: 50 BPM | SYSTOLIC BLOOD PRESSURE: 110 MMHG | OXYGEN SATURATION: 93 % | HEIGHT: 70 IN | BODY MASS INDEX: 20.04 KG/M2 | DIASTOLIC BLOOD PRESSURE: 64 MMHG

## 2025-04-22 DIAGNOSIS — R63.4 WEIGHT LOSS: ICD-10-CM

## 2025-04-22 DIAGNOSIS — K58.9 IRRITABLE BOWEL SYNDROME, UNSPECIFIED TYPE: Primary | ICD-10-CM

## 2025-04-22 PROCEDURE — 1123F ACP DISCUSS/DSCN MKR DOCD: CPT | Performed by: NURSE PRACTITIONER

## 2025-04-22 PROCEDURE — 3074F SYST BP LT 130 MM HG: CPT | Performed by: NURSE PRACTITIONER

## 2025-04-22 PROCEDURE — 99214 OFFICE O/P EST MOD 30 MIN: CPT | Performed by: NURSE PRACTITIONER

## 2025-04-22 PROCEDURE — 3078F DIAST BP <80 MM HG: CPT | Performed by: NURSE PRACTITIONER

## 2025-04-22 PROCEDURE — 3017F COLORECTAL CA SCREEN DOC REV: CPT | Performed by: NURSE PRACTITIONER

## 2025-04-22 PROCEDURE — G8420 CALC BMI NORM PARAMETERS: HCPCS | Performed by: NURSE PRACTITIONER

## 2025-04-22 PROCEDURE — 1160F RVW MEDS BY RX/DR IN RCRD: CPT | Performed by: NURSE PRACTITIONER

## 2025-04-22 PROCEDURE — 1036F TOBACCO NON-USER: CPT | Performed by: NURSE PRACTITIONER

## 2025-04-22 PROCEDURE — 1159F MED LIST DOCD IN RCRD: CPT | Performed by: NURSE PRACTITIONER

## 2025-04-22 PROCEDURE — G2211 COMPLEX E/M VISIT ADD ON: HCPCS | Performed by: NURSE PRACTITIONER

## 2025-04-22 PROCEDURE — G8427 DOCREV CUR MEDS BY ELIG CLIN: HCPCS | Performed by: NURSE PRACTITIONER

## 2025-04-29 ASSESSMENT — ENCOUNTER SYMPTOMS
SHORTNESS OF BREATH: 0
CHEST TIGHTNESS: 0
NAUSEA: 0
DIARRHEA: 0
ABDOMINAL PAIN: 0
COLOR CHANGE: 0
COUGH: 0
SORE THROAT: 0
VOMITING: 0

## 2025-04-29 NOTE — PROGRESS NOTES
Mr.Paul ZBIGNIEW Mobley is a 70 y.o. male who presents today for  Chief Complaint   Patient presents with    3 Month Follow-Up       HPI:  History of Present Illness  The patient presents for evaluation of abdominal issues and weight loss.    He reports that his current medication regimen, which includes Lomotil and phenobarbital, is effectively managing his abdominal symptoms.    Weight loss has been noted, which is typical for him during the summer months. A decrease in appetite is acknowledged, attributed to a busy schedule. Historically, he has never been a large eater unless prompted. Nutritional drinks were previously consumed but discontinued due to gastrointestinal discomfort.       Results  Labs   - Blood work: 07/2024, Normal       Review of Systems   Constitutional:  Negative for activity change and fever.   HENT:  Negative for congestion, ear pain and sore throat.    Respiratory:  Negative for cough, chest tightness and shortness of breath.    Cardiovascular:  Negative for chest pain.   Gastrointestinal:  Negative for abdominal pain, diarrhea, nausea and vomiting.   Genitourinary:  Negative for frequency and urgency.   Musculoskeletal:  Negative for arthralgias and myalgias.   Skin:  Negative for color change.   Neurological:  Negative for dizziness, weakness and numbness.   Psychiatric/Behavioral:  Negative for agitation. The patient is not nervous/anxious.        Past Medical History:   Diagnosis Date    GERD (gastroesophageal reflux disease)     Hyperlipidemia     Hypertension     IBS (irritable bowel syndrome)        Current Outpatient Medications   Medication Sig Dispense Refill    PHENobarbital 16.2 MG tablet Take 1 tablet by mouth daily for 30 days. Max Daily Amount: 16.2 mg 30 tablet 0    diphenoxylate-atropine (LOMOTIL) 2.5-0.025 MG per tablet TAKE 1 TABLET BY MOUTH TWICE A  tablet 0    rosuvastatin (CRESTOR) 10 MG tablet TAKE 1 TABLET DAILY 90 tablet 1    metoprolol succinate (TOPROL XL) 50 MG

## 2025-05-03 DIAGNOSIS — K58.0 IRRITABLE BOWEL SYNDROME WITH DIARRHEA: ICD-10-CM

## 2025-05-05 RX ORDER — LOSARTAN POTASSIUM AND HYDROCHLOROTHIAZIDE 12.5; 1 MG/1; MG/1
1 TABLET ORAL DAILY
Qty: 90 TABLET | Refills: 0 | Status: SHIPPED | OUTPATIENT
Start: 2025-05-05

## 2025-05-05 NOTE — TELEPHONE ENCOUNTER
Ziyad Mobley called to request a refill on his medication.      Last office visit : 4/22/2025   Next office visit : 7/23/2025     Last UDS:   Benzodiazepine Screen, Urine   Date Value Ref Range Status   01/21/2025 neg  Final     Buprenorphine Urine   Date Value Ref Range Status   01/21/2025 neg  Final     Cocaine Metabolite Screen, Urine   Date Value Ref Range Status   01/21/2025 neg  Final     Gabapentin Screen, Urine   Date Value Ref Range Status   01/21/2025 not tested  Final     Oxycodone Screen, Ur   Date Value Ref Range Status   01/21/2025 neg  Final     Propoxyphene Screen, Urine   Date Value Ref Range Status   01/21/2025 neg  Final     THC Screen, Urine   Date Value Ref Range Status   01/21/2025 neg  Final     Tricyclic Antidepressants, Urine   Date Value Ref Range Status   01/21/2025 neg  Final       Last Ian: 4-18-25  Medication Contract: 1-21-25   Last Fill: 2-5-25    Requested Prescriptions     Pending Prescriptions Disp Refills    diphenoxylate-atropine (LOMOTIL) 2.5-0.025 MG per tablet [Pharmacy Med Name: DIPHENOXYLATE-ATROP 2.5-0.025] 60 tablet 0     Sig: Take 1 tablet by mouth 2 times daily for 30 days. Max Daily Amount: 2 tablets     Signed Prescriptions Disp Refills    losartan-hydroCHLOROthiazide (HYZAAR) 100-12.5 MG per tablet 90 tablet 0     Sig: TAKE 1 TABLET BY MOUTH EVERY DAY     Authorizing Provider: KEVIN SUMMERS     Ordering User: SHELLEY RODRIGUEZ                   Please approve or refuse this medication.   Shelley Rodriguez LPN   -Supportive listening today, given information for behavioral therapy last visit

## 2025-05-06 RX ORDER — DIPHENOXYLATE HYDROCHLORIDE AND ATROPINE SULFATE 2.5; .025 MG/1; MG/1
1 TABLET ORAL 2 TIMES DAILY
Qty: 60 TABLET | Refills: 0 | Status: SHIPPED | OUTPATIENT
Start: 2025-05-06 | End: 2025-06-05

## 2025-05-10 DIAGNOSIS — I10 ESSENTIAL (PRIMARY) HYPERTENSION: ICD-10-CM

## 2025-05-12 RX ORDER — BUPROPION HYDROCHLORIDE 150 MG/1
150 TABLET, EXTENDED RELEASE ORAL DAILY
Qty: 90 TABLET | Refills: 0 | Status: SHIPPED | OUTPATIENT
Start: 2025-05-12

## 2025-05-12 RX ORDER — METOPROLOL SUCCINATE 50 MG/1
50 TABLET, EXTENDED RELEASE ORAL DAILY
Qty: 90 TABLET | Refills: 0 | Status: SHIPPED | OUTPATIENT
Start: 2025-05-12

## 2025-05-12 NOTE — TELEPHONE ENCOUNTER
Ziyad Mobley called to request a refill on his medication.      Last office visit : 4/22/2025   Next office visit : 7/23/2025     Requested Prescriptions     Pending Prescriptions Disp Refills    metoprolol succinate (TOPROL XL) 50 MG extended release tablet [Pharmacy Med Name: METOPROLOL SUCC ER 50 MG TAB] 90 tablet 0     Sig: TAKE 1 TABLET BY MOUTH EVERY DAY    buPROPion (WELLBUTRIN SR) 150 MG extended release tablet [Pharmacy Med Name: BUPROPION HCL  MG TABLET] 90 tablet 0     Sig: TAKE 1 TABLET BY MOUTH EVERY DAY            Shelley Rodriguez LPN

## 2025-05-14 RX ORDER — ROSUVASTATIN CALCIUM 10 MG/1
10 TABLET, COATED ORAL DAILY
Qty: 90 TABLET | Refills: 0 | Status: SHIPPED | OUTPATIENT
Start: 2025-05-14

## 2025-05-14 NOTE — TELEPHONE ENCOUNTER
Ziyad Mobley called to request a refill on his medication.      Last office visit : 4/22/2025   Next office visit : 7/23/2025     Requested Prescriptions     Pending Prescriptions Disp Refills    rosuvastatin (CRESTOR) 10 MG tablet [Pharmacy Med Name: ROSUVASTATIN CALCIUM 10 MG TAB] 90 tablet 0     Sig: TAKE 1 TABLET DAILY            Shelley Rodriguez LPN

## 2025-05-17 DIAGNOSIS — K58.9 IRRITABLE BOWEL SYNDROME, UNSPECIFIED TYPE: Chronic | ICD-10-CM

## 2025-05-19 DIAGNOSIS — K58.9 IRRITABLE BOWEL SYNDROME, UNSPECIFIED TYPE: Chronic | ICD-10-CM

## 2025-05-20 RX ORDER — PHENOBARBITAL 16.2 MG/1
16.2 TABLET ORAL DAILY
Qty: 30 TABLET | Refills: 0 | Status: SHIPPED | OUTPATIENT
Start: 2025-05-20 | End: 2025-06-19

## 2025-05-20 RX ORDER — PHENOBARBITAL 16.2 MG/1
16.2 TABLET ORAL DAILY
Qty: 30 TABLET | Refills: 0 | OUTPATIENT
Start: 2025-05-20

## 2025-05-20 NOTE — TELEPHONE ENCOUNTER
Ziyad ZBIGNIEW Mobley called to request a refill on his medication.      Last office visit : 4/22/2025   Next office visit : 5/19/2025     Last UDS: 01/21/25  Benzodiazepine Screen, Urine   Date Value Ref Range Status   01/21/2025 neg  Final     Buprenorphine Urine   Date Value Ref Range Status   01/21/2025 neg  Final     Cocaine Metabolite Screen, Urine   Date Value Ref Range Status   01/21/2025 neg  Final     Gabapentin Screen, Urine   Date Value Ref Range Status   01/21/2025 not tested  Final     Oxycodone Screen, Ur   Date Value Ref Range Status   01/21/2025 neg  Final     Propoxyphene Screen, Urine   Date Value Ref Range Status   01/21/2025 neg  Final     THC Screen, Urine   Date Value Ref Range Status   01/21/2025 neg  Final     Tricyclic Antidepressants, Urine   Date Value Ref Range Status   01/21/2025 neg  Final       Last Ian: 05/20/25   Medication Contract: 01/21/25   Last Fill: 04/18/2025    Requested Prescriptions     Pending Prescriptions Disp Refills    PHENobarbital 16.2 MG tablet 30 tablet 0     Sig: Take 1 tablet by mouth daily for 30 days. Max Daily Amount: 16.2 mg         Please approve or refuse this medication.   Rani Macedo MA

## 2025-06-08 DIAGNOSIS — K58.0 IRRITABLE BOWEL SYNDROME WITH DIARRHEA: ICD-10-CM

## 2025-06-09 NOTE — TELEPHONE ENCOUNTER
Ziyad ZBIGNIEW Sheltoncammie called to request a refill on his medication.      Last office visit : 4/22/2025   Next office visit : 7/23/2025     Last UDS: 01/21/2025  Benzodiazepine Screen, Urine   Date Value Ref Range Status   01/21/2025 neg  Final     Buprenorphine Urine   Date Value Ref Range Status   01/21/2025 neg  Final     Cocaine Metabolite Screen, Urine   Date Value Ref Range Status   01/21/2025 neg  Final     Gabapentin Screen, Urine   Date Value Ref Range Status   01/21/2025 not tested  Final     Oxycodone Screen, Ur   Date Value Ref Range Status   01/21/2025 neg  Final     Propoxyphene Screen, Urine   Date Value Ref Range Status   01/21/2025 neg  Final     THC Screen, Urine   Date Value Ref Range Status   01/21/2025 neg  Final     Tricyclic Antidepressants, Urine   Date Value Ref Range Status   01/21/2025 neg  Final       Last Ian: 06/09/2025  Medication Contract: 01/21/25   Last Fill: 05/06/2025    Requested Prescriptions     Pending Prescriptions Disp Refills    diphenoxylate-atropine (LOMOTIL) 2.5-0.025 MG per tablet 60 tablet 0     Sig: Take 1 tablet by mouth 2 times daily for 30 days. Max Daily Amount: 2 tablets         Please approve or refuse this medication.   Rani Macedo MA

## 2025-06-10 RX ORDER — DIPHENOXYLATE HYDROCHLORIDE AND ATROPINE SULFATE 2.5; .025 MG/1; MG/1
1 TABLET ORAL 2 TIMES DAILY
Qty: 60 TABLET | Refills: 0 | Status: SHIPPED | OUTPATIENT
Start: 2025-06-10 | End: 2025-07-10

## 2025-06-20 ENCOUNTER — PATIENT MESSAGE (OUTPATIENT)
Dept: PRIMARY CARE CLINIC | Age: 70
End: 2025-06-20

## 2025-06-20 DIAGNOSIS — K58.9 IRRITABLE BOWEL SYNDROME, UNSPECIFIED TYPE: Chronic | ICD-10-CM

## 2025-06-20 RX ORDER — PHENOBARBITAL 16.2 MG/1
16.2 TABLET ORAL DAILY
Qty: 30 TABLET | Refills: 0 | Status: SHIPPED | OUTPATIENT
Start: 2025-06-20 | End: 2025-07-20

## 2025-06-20 NOTE — TELEPHONE ENCOUNTER
Ziyad ZBIGNIEW Sheltoncammie called to request a refill on his medication.      Last office visit : 4/22/2025   Next office visit : 7/23/2025     Last UDS: 01/21/25  Benzodiazepine Screen, Urine   Date Value Ref Range Status   01/21/2025 neg  Final     Buprenorphine Urine   Date Value Ref Range Status   01/21/2025 neg  Final     Cocaine Metabolite Screen, Urine   Date Value Ref Range Status   01/21/2025 neg  Final     Gabapentin Screen, Urine   Date Value Ref Range Status   01/21/2025 not tested  Final     Oxycodone Screen, Ur   Date Value Ref Range Status   01/21/2025 neg  Final     Propoxyphene Screen, Urine   Date Value Ref Range Status   01/21/2025 neg  Final     THC Screen, Urine   Date Value Ref Range Status   01/21/2025 neg  Final     Tricyclic Antidepressants, Urine   Date Value Ref Range Status   01/21/2025 neg  Final       Last Ian: 06/20/25  Medication Contract: 01/21/25   Last Fill: 05/20/25    Requested Prescriptions     Pending Prescriptions Disp Refills    PHENobarbital 16.2 MG tablet 30 tablet 0     Sig: Take 1 tablet by mouth daily for 30 days. Max Daily Amount: 16.2 mg         Please approve or refuse this medication.   Rani Macedo MA

## 2025-07-02 DIAGNOSIS — K58.0 IRRITABLE BOWEL SYNDROME WITH DIARRHEA: ICD-10-CM

## 2025-07-02 RX ORDER — DIPHENOXYLATE HYDROCHLORIDE AND ATROPINE SULFATE 2.5; .025 MG/1; MG/1
1 TABLET ORAL 2 TIMES DAILY
Qty: 180 TABLET | Refills: 0 | Status: SHIPPED | OUTPATIENT
Start: 2025-07-02 | End: 2025-09-30

## 2025-07-07 ENCOUNTER — PATIENT MESSAGE (OUTPATIENT)
Dept: PRIMARY CARE CLINIC | Age: 70
End: 2025-07-07

## 2025-07-09 ENCOUNTER — TELEPHONE (OUTPATIENT)
Dept: PRIMARY CARE CLINIC | Age: 70
End: 2025-07-09

## 2025-07-09 NOTE — TELEPHONE ENCOUNTER
Pt wife states he is not eating and drinking like he should is forgetting lots of things she is very concerned he does not think so,  wife stated he said something to her daughter about her tattoos asking when did she get them she has had them for 15yrs. He also has asked who was in the room with them, she is concerned.        She would like for labs to be put in prior to appt  07/23 discus and see if this is causing anything

## 2025-07-10 DIAGNOSIS — E78.01 FAMILIAL HYPERCHOLESTEROLEMIA: ICD-10-CM

## 2025-07-10 DIAGNOSIS — Z71.1 CONCERN ABOUT MEMORY: ICD-10-CM

## 2025-07-10 DIAGNOSIS — Z12.5 SCREENING FOR PROSTATE CANCER: ICD-10-CM

## 2025-07-10 DIAGNOSIS — I10 ESSENTIAL (PRIMARY) HYPERTENSION: Primary | ICD-10-CM

## 2025-07-10 DIAGNOSIS — R63.4 WEIGHT LOSS: ICD-10-CM

## 2025-07-16 ENCOUNTER — PATIENT MESSAGE (OUTPATIENT)
Dept: PRIMARY CARE CLINIC | Age: 70
End: 2025-07-16

## 2025-07-16 DIAGNOSIS — K58.9 IRRITABLE BOWEL SYNDROME, UNSPECIFIED TYPE: Chronic | ICD-10-CM

## 2025-07-17 RX ORDER — PHENOBARBITAL 16.2 MG/1
16.2 TABLET ORAL DAILY
Qty: 30 TABLET | Refills: 0 | Status: SHIPPED | OUTPATIENT
Start: 2025-07-17 | End: 2025-08-16

## 2025-07-17 NOTE — TELEPHONE ENCOUNTER
Ziyad ZBIGNIEW Sheltoncammie called to request a refill on his medication.      Last office visit : 4/22/2025   Next office visit : 7/23/2025 01/21/25  Last UDS:   Benzodiazepine Screen, Urine   Date Value Ref Range Status   01/21/2025 neg  Final     Buprenorphine Urine   Date Value Ref Range Status   01/21/2025 neg  Final     Cocaine Metabolite Screen, Urine   Date Value Ref Range Status   01/21/2025 neg  Final     Gabapentin Screen, Urine   Date Value Ref Range Status   01/21/2025 not tested  Final     Oxycodone Screen, Ur   Date Value Ref Range Status   01/21/2025 neg  Final     Propoxyphene Screen, Urine   Date Value Ref Range Status   01/21/2025 neg  Final     THC Screen, Urine   Date Value Ref Range Status   01/21/2025 neg  Final     Tricyclic Antidepressants, Urine   Date Value Ref Range Status   01/21/2025 neg  Final       Last Ian: 07/17/2025  Medication Contract: 01/21/25  Last Fill: 06/20/25    Requested Prescriptions     Pending Prescriptions Disp Refills    PHENobarbital 16.2 MG tablet 30 tablet 0     Sig: Take 1 tablet by mouth daily for 30 days. Max Daily Amount: 16.2 mg         Please approve or refuse this medication.   Rani Macedo MA

## 2025-07-21 DIAGNOSIS — I10 ESSENTIAL (PRIMARY) HYPERTENSION: ICD-10-CM

## 2025-07-21 DIAGNOSIS — E78.01 FAMILIAL HYPERCHOLESTEROLEMIA: ICD-10-CM

## 2025-07-21 DIAGNOSIS — R63.4 WEIGHT LOSS: ICD-10-CM

## 2025-07-21 LAB
ALBUMIN SERPL-MCNC: 3.7 G/DL (ref 3.5–5.2)
ALP SERPL-CCNC: 69 U/L (ref 40–129)
ALT SERPL-CCNC: 13 U/L (ref 10–50)
ANION GAP SERPL CALCULATED.3IONS-SCNC: 9 MMOL/L (ref 8–16)
AST SERPL-CCNC: 16 U/L (ref 10–50)
BASOPHILS # BLD: 0 K/UL (ref 0–0.2)
BASOPHILS NFR BLD: 0.7 % (ref 0–1)
BILIRUB SERPL-MCNC: 0.3 MG/DL (ref 0.2–1.2)
BUN SERPL-MCNC: 25 MG/DL (ref 8–23)
CALCIUM SERPL-MCNC: 9.1 MG/DL (ref 8.8–10.2)
CHLORIDE SERPL-SCNC: 104 MMOL/L (ref 98–107)
CHOLEST SERPL-MCNC: 99 MG/DL (ref 0–199)
CO2 SERPL-SCNC: 27 MMOL/L (ref 22–29)
CREAT SERPL-MCNC: 1.7 MG/DL (ref 0.7–1.2)
EOSINOPHIL # BLD: 0.1 K/UL (ref 0–0.6)
EOSINOPHIL NFR BLD: 1.8 % (ref 0–5)
ERYTHROCYTE [DISTWIDTH] IN BLOOD BY AUTOMATED COUNT: 12.3 % (ref 11.5–14.5)
GLUCOSE SERPL-MCNC: 94 MG/DL (ref 70–99)
HCT VFR BLD AUTO: 28.3 % (ref 42–52)
HDLC SERPL-MCNC: 40 MG/DL (ref 40–60)
HGB BLD-MCNC: 9.5 G/DL (ref 14–18)
IMM GRANULOCYTES # BLD: 0 K/UL
LDLC SERPL CALC-MCNC: 43 MG/DL
LYMPHOCYTES # BLD: 1.6 K/UL (ref 1.1–4.5)
LYMPHOCYTES NFR BLD: 26.2 % (ref 20–40)
MCH RBC QN AUTO: 30.6 PG (ref 27–31)
MCHC RBC AUTO-ENTMCNC: 33.6 G/DL (ref 33–37)
MCV RBC AUTO: 91.3 FL (ref 80–94)
MONOCYTES # BLD: 0.5 K/UL (ref 0–0.9)
MONOCYTES NFR BLD: 7.5 % (ref 0–10)
NEUTROPHILS # BLD: 3.9 K/UL (ref 1.5–7.5)
NEUTS SEG NFR BLD: 63.5 % (ref 50–65)
PLATELET # BLD AUTO: 218 K/UL (ref 130–400)
PMV BLD AUTO: 10.7 FL (ref 9.4–12.4)
POTASSIUM SERPL-SCNC: 4.1 MMOL/L (ref 3.5–5.1)
PROT SERPL-MCNC: 6 G/DL (ref 6.4–8.3)
PSA SERPL-MCNC: 0.96 NG/ML (ref 0–4)
RBC # BLD AUTO: 3.1 M/UL (ref 4.7–6.1)
SODIUM SERPL-SCNC: 140 MMOL/L (ref 136–145)
TRIGL SERPL-MCNC: 78 MG/DL (ref 0–149)
WBC # BLD AUTO: 6.1 K/UL (ref 4.8–10.8)

## 2025-07-23 ENCOUNTER — OFFICE VISIT (OUTPATIENT)
Dept: PRIMARY CARE CLINIC | Age: 70
End: 2025-07-23
Payer: MEDICARE

## 2025-07-23 VITALS
OXYGEN SATURATION: 98 % | HEIGHT: 70 IN | DIASTOLIC BLOOD PRESSURE: 70 MMHG | BODY MASS INDEX: 18.04 KG/M2 | SYSTOLIC BLOOD PRESSURE: 118 MMHG | WEIGHT: 126 LBS | HEART RATE: 47 BPM | TEMPERATURE: 97.6 F

## 2025-07-23 DIAGNOSIS — N28.9 DECREASED RENAL FUNCTION: ICD-10-CM

## 2025-07-23 DIAGNOSIS — Z12.12 ENCOUNTER FOR COLORECTAL CANCER SCREENING: Primary | ICD-10-CM

## 2025-07-23 DIAGNOSIS — Z12.11 ENCOUNTER FOR COLORECTAL CANCER SCREENING: Primary | ICD-10-CM

## 2025-07-23 DIAGNOSIS — E61.1 LOW IRON: ICD-10-CM

## 2025-07-23 PROCEDURE — 99214 OFFICE O/P EST MOD 30 MIN: CPT | Performed by: NURSE PRACTITIONER

## 2025-07-23 PROCEDURE — G2211 COMPLEX E/M VISIT ADD ON: HCPCS | Performed by: NURSE PRACTITIONER

## 2025-07-23 PROCEDURE — 3074F SYST BP LT 130 MM HG: CPT | Performed by: NURSE PRACTITIONER

## 2025-07-23 PROCEDURE — 3078F DIAST BP <80 MM HG: CPT | Performed by: NURSE PRACTITIONER

## 2025-07-23 PROCEDURE — 1159F MED LIST DOCD IN RCRD: CPT | Performed by: NURSE PRACTITIONER

## 2025-07-23 PROCEDURE — G8427 DOCREV CUR MEDS BY ELIG CLIN: HCPCS | Performed by: NURSE PRACTITIONER

## 2025-07-23 PROCEDURE — G8419 CALC BMI OUT NRM PARAM NOF/U: HCPCS | Performed by: NURSE PRACTITIONER

## 2025-07-23 PROCEDURE — 1123F ACP DISCUSS/DSCN MKR DOCD: CPT | Performed by: NURSE PRACTITIONER

## 2025-07-23 PROCEDURE — 1160F RVW MEDS BY RX/DR IN RCRD: CPT | Performed by: NURSE PRACTITIONER

## 2025-07-23 PROCEDURE — 3017F COLORECTAL CA SCREEN DOC REV: CPT | Performed by: NURSE PRACTITIONER

## 2025-07-23 PROCEDURE — 1036F TOBACCO NON-USER: CPT | Performed by: NURSE PRACTITIONER

## 2025-07-24 ENCOUNTER — PATIENT MESSAGE (OUTPATIENT)
Dept: PRIMARY CARE CLINIC | Age: 70
End: 2025-07-24

## 2025-07-28 ASSESSMENT — ENCOUNTER SYMPTOMS
COLOR CHANGE: 0
CHEST TIGHTNESS: 0
COUGH: 0
NAUSEA: 0
DIARRHEA: 0
SHORTNESS OF BREATH: 0
ABDOMINAL PAIN: 0
SORE THROAT: 0
VOMITING: 0

## 2025-07-31 ENCOUNTER — PATIENT MESSAGE (OUTPATIENT)
Dept: PRIMARY CARE CLINIC | Age: 70
End: 2025-07-31

## 2025-07-31 RX ORDER — LOSARTAN POTASSIUM AND HYDROCHLOROTHIAZIDE 12.5; 1 MG/1; MG/1
1 TABLET ORAL DAILY
Qty: 90 TABLET | Refills: 1 | Status: SHIPPED | OUTPATIENT
Start: 2025-07-31

## 2025-08-01 DIAGNOSIS — R41.3 MEMORY LOSS: Primary | ICD-10-CM

## 2025-08-09 ENCOUNTER — PATIENT MESSAGE (OUTPATIENT)
Dept: PRIMARY CARE CLINIC | Age: 70
End: 2025-08-09

## 2025-08-09 DIAGNOSIS — I10 ESSENTIAL (PRIMARY) HYPERTENSION: ICD-10-CM

## 2025-08-12 RX ORDER — METOPROLOL SUCCINATE 50 MG/1
50 TABLET, EXTENDED RELEASE ORAL DAILY
Qty: 90 TABLET | Refills: 0 | OUTPATIENT
Start: 2025-08-12

## 2025-08-12 RX ORDER — ASPIRIN 81 MG/1
81 TABLET ORAL DAILY
Qty: 90 TABLET | Refills: 1 | Status: SHIPPED | OUTPATIENT
Start: 2025-08-12

## 2025-08-12 RX ORDER — ROSUVASTATIN CALCIUM 10 MG/1
10 TABLET, COATED ORAL DAILY
Qty: 90 TABLET | Refills: 1 | Status: SHIPPED | OUTPATIENT
Start: 2025-08-12

## 2025-08-12 RX ORDER — METOPROLOL SUCCINATE 50 MG/1
50 TABLET, EXTENDED RELEASE ORAL DAILY
Qty: 90 TABLET | Refills: 1 | Status: SHIPPED | OUTPATIENT
Start: 2025-08-12

## 2025-08-12 RX ORDER — ROSUVASTATIN CALCIUM 10 MG/1
10 TABLET, COATED ORAL DAILY
Qty: 90 TABLET | Refills: 0 | OUTPATIENT
Start: 2025-08-12

## 2025-08-12 RX ORDER — HYOSCYAMINE SULFATE 0.12 MG/1
0.12 TABLET ORAL DAILY
Qty: 90 TABLET | Refills: 1 | Status: SHIPPED | OUTPATIENT
Start: 2025-08-12

## 2025-08-13 RX ORDER — BUPROPION HYDROCHLORIDE 150 MG/1
150 TABLET, EXTENDED RELEASE ORAL DAILY
Qty: 90 TABLET | Refills: 1 | Status: SHIPPED | OUTPATIENT
Start: 2025-08-13

## 2025-08-19 ENCOUNTER — PATIENT MESSAGE (OUTPATIENT)
Dept: PRIMARY CARE CLINIC | Age: 70
End: 2025-08-19

## 2025-08-19 DIAGNOSIS — K58.9 IRRITABLE BOWEL SYNDROME, UNSPECIFIED TYPE: Chronic | ICD-10-CM

## 2025-08-19 RX ORDER — PHENOBARBITAL 16.2 MG/1
16.2 TABLET ORAL DAILY
Qty: 30 TABLET | Refills: 0 | Status: SHIPPED | OUTPATIENT
Start: 2025-08-19 | End: 2025-09-18

## (undated) DEVICE — SUT VIC 3/0 SUTUPAK TIES 18IN J910T

## (undated) DEVICE — DRSNG SURESITE WNDW 4X4.5

## (undated) DEVICE — SUT PROLN 0 MO7 CR8 18IN C841G

## (undated) DEVICE — PAD MINOR UNIVERSAL: Brand: MEDLINE INDUSTRIES, INC.

## (undated) DEVICE — ELECTRD BLD EZ CLN MOD XLNG 2.75IN

## (undated) DEVICE — TRAP FLD MINIVAC MEGADYNE 100ML

## (undated) DEVICE — DRN PENRS SIL 1/4X18IN LF STRL

## (undated) DEVICE — SUT VIC 4/0 P3 18IN UD VCP494H

## (undated) DEVICE — GLV SURG TRIUMPH MICRO PF LTX 7.5 STRL

## (undated) DEVICE — SPNG GZ STRL 2S 4X4 12PLY

## (undated) DEVICE — PK TURNOVER RM ADV

## (undated) DEVICE — SUT PROLN 2/0 CT2 30IN 8411H

## (undated) DEVICE — SHEET, T, LAPAROTOMY, STERILE: Brand: MEDLINE

## (undated) DEVICE — ANTIBACTERIAL UNDYED BRAIDED (POLYGLACTIN 910), SYNTHETIC ABSORBABLE SUTURE: Brand: COATED VICRYL

## (undated) DEVICE — ADHS LIQ MASTISOL 2/3ML

## (undated) DEVICE — VAGINAL PREP TRAY: Brand: MEDLINE INDUSTRIES, INC.

## (undated) DEVICE — 3M™ IOBAN™ 2 ANTIMICROBIAL INCISE DRAPE 6650EZ: Brand: IOBAN™ 2

## (undated) DEVICE — NEEDLE, QUINCKE 25GX3.5": Brand: MEDLINE

## (undated) DEVICE — SUT VIC 3/0 RB1 27IN UD VCP215H

## (undated) DEVICE — FORCEPS BX 240CM 2.4MM L NDL RAD JAW 4 M00513334

## (undated) DEVICE — 3M™ STERI-STRIP™ REINFORCED ADHESIVE SKIN CLOSURES, R1546, 1/4 IN X 4 IN (6 MM X 100 MM), 10 STRIPS/ENVELOPE: Brand: 3M™ STERI-STRIP™

## (undated) DEVICE — TOWEL,OR,DSP,ST,BLUE,STD,4/PK,20PK/CS: Brand: MEDLINE

## (undated) DEVICE — SUT SILK 2/0 SH 30IN K833H

## (undated) DEVICE — SUT SILK 2/0 SUTUPAK TIES 24IN SA75H

## (undated) DEVICE — TBG PENCL TELESCP MEGADYNE SMOKE EVAC 10FT

## (undated) DEVICE — INTENDED FOR TISSUE SEPARATION, AND OTHER PROCEDURES THAT REQUIRE A SHARP SURGICAL BLADE TO PUNCTURE OR CUT.: Brand: BARD-PARKER ® STAINLESS STEEL BLADES